# Patient Record
Sex: MALE | URBAN - METROPOLITAN AREA
[De-identification: names, ages, dates, MRNs, and addresses within clinical notes are randomized per-mention and may not be internally consistent; named-entity substitution may affect disease eponyms.]

---

## 2017-07-31 ENCOUNTER — APPOINTMENT (RX ONLY)
Dept: URBAN - METROPOLITAN AREA CLINIC 158 | Facility: CLINIC | Age: 57
Setting detail: DERMATOLOGY
End: 2017-07-31

## 2017-07-31 VITALS — HEART RATE: 81 BPM | SYSTOLIC BLOOD PRESSURE: 111 MMHG | DIASTOLIC BLOOD PRESSURE: 76 MMHG

## 2017-07-31 DIAGNOSIS — D22 MELANOCYTIC NEVI: ICD-10-CM

## 2017-07-31 DIAGNOSIS — D18.0 HEMANGIOMA: ICD-10-CM

## 2017-07-31 DIAGNOSIS — L57.0 ACTINIC KERATOSIS: ICD-10-CM

## 2017-07-31 DIAGNOSIS — L82.1 OTHER SEBORRHEIC KERATOSIS: ICD-10-CM

## 2017-07-31 PROBLEM — C44.01 BASAL CELL CARCINOMA OF SKIN OF LIP: Status: ACTIVE | Noted: 2017-07-31

## 2017-07-31 PROBLEM — D22.39 MELANOCYTIC NEVI OF OTHER PARTS OF FACE: Status: ACTIVE | Noted: 2017-07-31

## 2017-07-31 PROBLEM — C44.311 BASAL CELL CARCINOMA OF SKIN OF NOSE: Status: ACTIVE | Noted: 2017-07-31

## 2017-07-31 PROBLEM — D18.01 HEMANGIOMA OF SKIN AND SUBCUTANEOUS TISSUE: Status: ACTIVE | Noted: 2017-07-31

## 2017-07-31 PROCEDURE — ? MOHS SURGERY

## 2017-07-31 PROCEDURE — 99203 OFFICE O/P NEW LOW 30 MIN: CPT | Mod: 25

## 2017-07-31 PROCEDURE — 17311 MOHS 1 STAGE H/N/HF/G: CPT

## 2017-07-31 PROCEDURE — ? LIQUID NITROGEN

## 2017-07-31 PROCEDURE — ? COUNSELING

## 2017-07-31 PROCEDURE — ? DEFER

## 2017-07-31 PROCEDURE — 17000 DESTRUCT PREMALG LESION: CPT

## 2017-07-31 PROCEDURE — 17003 DESTRUCT PREMALG LES 2-14: CPT

## 2017-07-31 PROCEDURE — 15260 FTH/GFT FR N/E/E/L 20 SQCM/<: CPT

## 2017-07-31 ASSESSMENT — LOCATION SIMPLE DESCRIPTION DERM
LOCATION SIMPLE: SUPERIOR FOREHEAD
LOCATION SIMPLE: CHEST
LOCATION SIMPLE: RIGHT PRETIBIAL REGION
LOCATION SIMPLE: LEFT FOREHEAD
LOCATION SIMPLE: RIGHT CHEEK
LOCATION SIMPLE: LEFT CHEEK

## 2017-07-31 ASSESSMENT — LOCATION DETAILED DESCRIPTION DERM
LOCATION DETAILED: SUPERIOR MID FOREHEAD
LOCATION DETAILED: LEFT FOREHEAD
LOCATION DETAILED: LEFT MEDIAL SUPERIOR CHEST
LOCATION DETAILED: RIGHT INFERIOR CENTRAL MALAR CHEEK
LOCATION DETAILED: RIGHT CENTRAL MALAR CHEEK
LOCATION DETAILED: LEFT CENTRAL MALAR CHEEK
LOCATION DETAILED: RIGHT PROXIMAL PRETIBIAL REGION
LOCATION DETAILED: RIGHT LATERAL SUPERIOR CHEST

## 2017-07-31 ASSESSMENT — LOCATION ZONE DERM
LOCATION ZONE: FACE
LOCATION ZONE: LEG
LOCATION ZONE: TRUNK

## 2017-08-07 ENCOUNTER — APPOINTMENT (RX ONLY)
Dept: URBAN - METROPOLITAN AREA CLINIC 158 | Facility: CLINIC | Age: 57
Setting detail: DERMATOLOGY
End: 2017-08-07

## 2017-08-07 DIAGNOSIS — Z48.01 ENCOUNTER FOR CHANGE OR REMOVAL OF SURGICAL WOUND DRESSING: ICD-10-CM

## 2017-08-07 PROCEDURE — ? DRESSING CHANGE

## 2017-08-07 ASSESSMENT — LOCATION SIMPLE DESCRIPTION DERM: LOCATION SIMPLE: LEFT NOSE

## 2017-08-07 ASSESSMENT — LOCATION DETAILED DESCRIPTION DERM: LOCATION DETAILED: LEFT NASAL ALA

## 2017-08-07 ASSESSMENT — LOCATION ZONE DERM: LOCATION ZONE: NOSE

## 2017-09-08 ENCOUNTER — APPOINTMENT (RX ONLY)
Dept: URBAN - METROPOLITAN AREA CLINIC 158 | Facility: CLINIC | Age: 57
Setting detail: DERMATOLOGY
End: 2017-09-08

## 2017-09-08 VITALS — HEART RATE: 83 BPM | DIASTOLIC BLOOD PRESSURE: 74 MMHG | SYSTOLIC BLOOD PRESSURE: 108 MMHG

## 2017-09-08 PROBLEM — C44.01 BASAL CELL CARCINOMA OF SKIN OF LIP: Status: ACTIVE | Noted: 2017-09-08

## 2017-09-08 PROCEDURE — ? MOHS SURGERY

## 2017-09-08 PROCEDURE — 14061 TIS TRNFR E/N/E/L10.1-30SQCM: CPT | Mod: 79

## 2017-09-08 PROCEDURE — 17311 MOHS 1 STAGE H/N/HF/G: CPT | Mod: 79

## 2017-09-14 ENCOUNTER — APPOINTMENT (RX ONLY)
Dept: URBAN - METROPOLITAN AREA CLINIC 158 | Facility: CLINIC | Age: 57
Setting detail: DERMATOLOGY
End: 2017-09-14

## 2017-09-14 DIAGNOSIS — Z48.01 ENCOUNTER FOR CHANGE OR REMOVAL OF SURGICAL WOUND DRESSING: ICD-10-CM

## 2017-09-14 PROCEDURE — ? DRESSING CHANGE

## 2017-09-14 ASSESSMENT — LOCATION SIMPLE DESCRIPTION DERM: LOCATION SIMPLE: LEFT LIP

## 2017-09-14 ASSESSMENT — LOCATION DETAILED DESCRIPTION DERM: LOCATION DETAILED: LEFT LOWER CUTANEOUS LIP

## 2017-09-14 ASSESSMENT — LOCATION ZONE DERM: LOCATION ZONE: LIP

## 2018-06-25 ENCOUNTER — APPOINTMENT (RX ONLY)
Dept: URBAN - METROPOLITAN AREA CLINIC 158 | Facility: CLINIC | Age: 58
Setting detail: DERMATOLOGY
End: 2018-06-25

## 2018-06-25 VITALS — HEART RATE: 120 BPM | SYSTOLIC BLOOD PRESSURE: 99 MMHG | DIASTOLIC BLOOD PRESSURE: 84 MMHG

## 2018-06-25 DIAGNOSIS — L81.4 OTHER MELANIN HYPERPIGMENTATION: ICD-10-CM

## 2018-06-25 DIAGNOSIS — L57.0 ACTINIC KERATOSIS: ICD-10-CM

## 2018-06-25 DIAGNOSIS — L82.1 OTHER SEBORRHEIC KERATOSIS: ICD-10-CM

## 2018-06-25 PROCEDURE — 99213 OFFICE O/P EST LOW 20 MIN: CPT

## 2018-06-25 PROCEDURE — ? COUNSELING

## 2018-06-25 ASSESSMENT — LOCATION SIMPLE DESCRIPTION DERM
LOCATION SIMPLE: LEFT FOREARM
LOCATION SIMPLE: LEFT CHEEK
LOCATION SIMPLE: RIGHT FOREHEAD

## 2018-06-25 ASSESSMENT — LOCATION DETAILED DESCRIPTION DERM
LOCATION DETAILED: LEFT INFERIOR CENTRAL MALAR CHEEK
LOCATION DETAILED: RIGHT MEDIAL FOREHEAD
LOCATION DETAILED: LEFT VENTRAL DISTAL FOREARM

## 2018-06-25 ASSESSMENT — LOCATION ZONE DERM
LOCATION ZONE: ARM
LOCATION ZONE: FACE

## 2019-04-17 ENCOUNTER — APPOINTMENT (RX ONLY)
Dept: URBAN - METROPOLITAN AREA CLINIC 158 | Facility: CLINIC | Age: 59
Setting detail: DERMATOLOGY
End: 2019-04-17

## 2019-04-17 DIAGNOSIS — L82.1 OTHER SEBORRHEIC KERATOSIS: ICD-10-CM

## 2019-04-17 DIAGNOSIS — B07.8 OTHER VIRAL WARTS: ICD-10-CM

## 2019-04-17 DIAGNOSIS — L57.0 ACTINIC KERATOSIS: ICD-10-CM

## 2019-04-17 PROCEDURE — 17003 DESTRUCT PREMALG LES 2-14: CPT | Mod: 59

## 2019-04-17 PROCEDURE — 17000 DESTRUCT PREMALG LESION: CPT | Mod: 59

## 2019-04-17 PROCEDURE — ? LIQUID NITROGEN

## 2019-04-17 PROCEDURE — 17110 DESTRUCTION B9 LES UP TO 14: CPT

## 2019-04-17 PROCEDURE — ? COUNSELING

## 2019-04-17 PROCEDURE — 99213 OFFICE O/P EST LOW 20 MIN: CPT | Mod: 25

## 2019-04-17 ASSESSMENT — LOCATION ZONE DERM
LOCATION ZONE: ARM
LOCATION ZONE: NOSE
LOCATION ZONE: EAR
LOCATION ZONE: TRUNK
LOCATION ZONE: FACE

## 2019-04-17 ASSESSMENT — LOCATION DETAILED DESCRIPTION DERM
LOCATION DETAILED: NASAL DORSUM
LOCATION DETAILED: LEFT INFERIOR CRUS OF ANTIHELIX
LOCATION DETAILED: RIGHT CENTRAL MALAR CHEEK
LOCATION DETAILED: RIGHT INFERIOR CENTRAL MALAR CHEEK
LOCATION DETAILED: RIGHT CENTRAL TEMPLE
LOCATION DETAILED: LEFT INFERIOR CENTRAL MALAR CHEEK
LOCATION DETAILED: RIGHT INFERIOR TEMPLE
LOCATION DETAILED: LEFT DISTAL DORSAL FOREARM
LOCATION DETAILED: LEFT MEDIAL INFERIOR CHEST

## 2019-04-17 ASSESSMENT — LOCATION SIMPLE DESCRIPTION DERM
LOCATION SIMPLE: RIGHT CHEEK
LOCATION SIMPLE: CHEST
LOCATION SIMPLE: LEFT CHEEK
LOCATION SIMPLE: LEFT EAR
LOCATION SIMPLE: NOSE
LOCATION SIMPLE: LEFT FOREARM
LOCATION SIMPLE: RIGHT TEMPLE

## 2019-08-12 ENCOUNTER — TELEPHONE (OUTPATIENT)
Dept: ENDOSCOPY | Facility: HOSPITAL | Age: 59
End: 2019-08-12

## 2019-08-12 NOTE — TELEPHONE ENCOUNTER
----- Message from Aviva Navarro sent at 8/12/2019  8:43 AM CDT -----  Contact: PTs Wife  Wife calling requesting PT to get an appointment  PT has an in hand referral // PT covered under [employer]  Reason: Pancreas cyst / non cancerous / pancreatitis    Preferred Date: 09/03/19 or anything on that week.     Callback: [phone]

## 2019-08-16 ENCOUNTER — TELEPHONE (OUTPATIENT)
Dept: ENDOSCOPY | Facility: HOSPITAL | Age: 59
End: 2019-08-16

## 2019-08-16 NOTE — TELEPHONE ENCOUNTER
----- Message from Harsh Clark MD sent at 8/16/2019  3:02 PM CDT -----  Need Pancreas cyst clinic appointment and EUS.  Harsh Clark MD  ----- Message -----  From: Tabatha Purvis MA  Sent: 8/16/2019   2:55 PM  To: Harsh Clark MD    Please review scanned records. Should he come to clinic or EUS?

## 2019-08-23 ENCOUNTER — TELEPHONE (OUTPATIENT)
Dept: ENDOSCOPY | Facility: HOSPITAL | Age: 59
End: 2019-08-23

## 2019-08-23 NOTE — TELEPHONE ENCOUNTER
----- Message from Kaylee Lange sent at 8/23/2019  1:20 PM CDT -----  Contact: self [phone]  Pt would like for you to know the number.    Contact: self [phone]

## 2019-08-27 ENCOUNTER — TELEPHONE (OUTPATIENT)
Dept: ENDOSCOPY | Facility: HOSPITAL | Age: 59
End: 2019-08-27

## 2019-09-12 ENCOUNTER — HOSPITAL ENCOUNTER (OUTPATIENT)
Dept: RADIOLOGY | Facility: HOSPITAL | Age: 59
Discharge: HOME | End: 2019-09-12
Attending: INTERNAL MEDICINE
Payer: OTHER GOVERNMENT

## 2019-09-12 ENCOUNTER — OFFICE VISIT (OUTPATIENT)
Dept: GASTROENTEROLOGY | Facility: CLINIC | Age: 59
End: 2019-09-12
Payer: OTHER GOVERNMENT

## 2019-09-12 VITALS
SYSTOLIC BLOOD PRESSURE: 111 MMHG | HEART RATE: 126 BPM | DIASTOLIC BLOOD PRESSURE: 77 MMHG | WEIGHT: 140.63 LBS | BODY MASS INDEX: 23.43 KG/M2 | HEIGHT: 65 IN

## 2019-09-12 DIAGNOSIS — K86.2 PANCREAS CYST: ICD-10-CM

## 2019-09-12 DIAGNOSIS — K85.00 IDIOPATHIC ACUTE PANCREATITIS WITHOUT INFECTION OR NECROSIS: Primary | ICD-10-CM

## 2019-09-12 DIAGNOSIS — K86.89 PANCREATIC DUCT DILATED: ICD-10-CM

## 2019-09-12 DIAGNOSIS — K85.00 IDIOPATHIC ACUTE PANCREATITIS WITHOUT INFECTION OR NECROSIS: ICD-10-CM

## 2019-09-12 LAB
CREAT SERPL-MCNC: 1.1 MG/DL (ref 0.5–1.4)
SAMPLE: NORMAL

## 2019-09-12 PROCEDURE — 74160 CT ABDOMEN W/CONTRAST: CPT | Mod: 26,,, | Performed by: RADIOLOGY

## 2019-09-12 PROCEDURE — 74160 CT ABDOMEN WITH CONTRAST: ICD-10-PCS | Mod: 26,,, | Performed by: RADIOLOGY

## 2019-09-12 PROCEDURE — 99203 OFFICE O/P NEW LOW 30 MIN: CPT | Mod: S$PBB,,, | Performed by: INTERNAL MEDICINE

## 2019-09-12 PROCEDURE — 74160 CT ABDOMEN W/CONTRAST: CPT | Mod: TC

## 2019-09-12 PROCEDURE — 99203 PR OFFICE/OUTPT VISIT, NEW, LEVL III, 30-44 MIN: ICD-10-PCS | Mod: S$PBB,,, | Performed by: INTERNAL MEDICINE

## 2019-09-12 PROCEDURE — 99999 PR PBB SHADOW E&M-EST. PATIENT-LVL III: ICD-10-PCS | Mod: PBBFAC,,, | Performed by: INTERNAL MEDICINE

## 2019-09-12 PROCEDURE — 99213 OFFICE O/P EST LOW 20 MIN: CPT | Mod: PBBFAC,25 | Performed by: INTERNAL MEDICINE

## 2019-09-12 PROCEDURE — 25500020 PHARM REV CODE 255: Performed by: INTERNAL MEDICINE

## 2019-09-12 PROCEDURE — 99999 PR PBB SHADOW E&M-EST. PATIENT-LVL III: CPT | Mod: PBBFAC,,, | Performed by: INTERNAL MEDICINE

## 2019-09-12 RX ORDER — SUCRALFATE 1 G/10ML
1 SUSPENSION ORAL
COMMUNITY
Start: 2017-12-13

## 2019-09-12 RX ORDER — ONDANSETRON HYDROCHLORIDE 8 MG/1
8 TABLET, FILM COATED ORAL EVERY 12 HOURS PRN
Qty: 14 TABLET | Refills: 0 | Status: SHIPPED | OUTPATIENT
Start: 2019-09-12 | End: 2019-09-19

## 2019-09-12 RX ORDER — OMEPRAZOLE 20 MG/1
20 CAPSULE, DELAYED RELEASE ORAL
COMMUNITY
Start: 2012-12-18 | End: 2021-03-18

## 2019-09-12 RX ORDER — HYDROCODONE BITARTRATE AND ACETAMINOPHEN 7.5; 325 MG/15ML; MG/15ML
15 SOLUTION ORAL EVERY 6 HOURS PRN
Qty: 300 ML | Refills: 0 | Status: SHIPPED | OUTPATIENT
Start: 2019-09-12 | End: 2019-09-17

## 2019-09-12 RX ORDER — SIMVASTATIN 40 MG/1
40 TABLET, FILM COATED ORAL
COMMUNITY

## 2019-09-12 RX ORDER — CHOLECALCIFEROL (VITAMIN D3) 25 MCG
1000 TABLET ORAL
COMMUNITY

## 2019-09-12 RX ORDER — ACETAMINOPHEN 325 MG/1
650 TABLET ORAL
COMMUNITY

## 2019-09-12 RX ADMIN — IOHEXOL 75 ML: 350 INJECTION, SOLUTION INTRAVENOUS at 01:09

## 2019-09-12 NOTE — PROGRESS NOTES
[facility] Note    Reason for Visit:  The primary encounter diagnosis was Idiopathic acute pancreatitis without infection or necrosis. Diagnoses of Pancreas cyst and Pancreatic duct dilated were also pertinent to this visit.    PCP: Primary Doctor No   No address on file    HPI:  This is a 59 y.o. male here for evaluation of pancreas cyst and abdominal pain. The patient is carrying a diagnosis of side branched IPMN since 2013 with multiple EUS's with FNA (CEA >200). FNA did not demonstrated evidence of malignancy. He was follow with the combination of EUS's and imaging which demonstrated evidence of stability to decreased size of the pancreas cyst. The cyst is located in the body of the pancreas and measured <3 cm. There is evidence of progressive dilation of the PD up to 8 mm. In 2017 he underwent pancreatoscopy for biopsies. The patient developed pancreatitis and since then he stated intermittent episodes of abdominal pain. He have two EUS's with celiac plexus block which improved his symptoms. He stated he is very functional except with the episodes of pain for what he used acetaminophen. He doesn't smoke or drink alcohol. Denied family history of pancreatic cancer. He is S/P cholecystectomy. He was just discharge form the hospital with acute pancreatitis. This is his second episodes. Today he complaint of diffuse abdominal pain with nausea and diarrhea.        ROS:  Constitutional: No fevers. Stated chills  CV: No chest pain or palpitations  Pulm: No cough, No shortness of breath  GI: see HPI    Medical History:  has no past medical history on file.    Surgical History:  has no past surgical history on file.    Family History: family history is not on file..     Social History:  reports that he quit smoking about 20 years ago. His smoking use included cigarettes. He has a 10.00 pack-year smoking history. He does not have any smokeless tobacco history on file.    Review of patient's  "allergies indicates:   Allergen Reactions    Codeine Other (See Comments)     headache    Aspirin Rash    Penicillins Rash           Objective Findings:    Vital Signs:  /77   Pulse (!) 126   Ht 5' 5" (1.651 m)   Wt 63.8 kg (140 lb 10.5 oz)   BMI 23.41 kg/m²   Body mass index is 23.41 kg/m².    Physical Exam:  General Appearance: Well appearing but in moderate distress  HEENT:   Normocephalic, without obvious abnormality.  Sclera anicteric, no conjunctival pallor, Lips, mucosa, and tongue pink and dry; teeth and gums normal  NECK:  Supple, no lymphadenopathy noted  Lungs: CTA bilaterally in anterior and posterior fields, no wheezes, no crackles.  Heart:  Regular rate and rhythm, S1, S2 normal, no murmurs heard  Abdomen:Soft with diffuse abdominal tenderness  Extremities: no cyanosis or edema  Skin: No rash  Neurologic: Grossly normal      Labs:  Lab Results   Component Value Date    WBC 16.34 (H) 09/12/2019    HGB 14.9 09/12/2019    HCT 44.4 09/12/2019     09/12/2019    ALT 15 09/12/2019    AST 18 09/12/2019     09/12/2019    K 4.3 09/12/2019     09/12/2019    CREATININE 1.1 09/12/2019    BUN 18 09/12/2019    CO2 20 (L) 09/12/2019              Assessment:  1. Idiopathic acute pancreatitis without infection or necrosis    2. Pancreas cyst    3. Pancreatic duct dilated      The patient carried a diagnoses of branched IPMN but the dilation of the PD is concerning for mixed IPMN which carry a higher risk for malignancy.    Recommendations:  1. CBC, CMP, lipase and CT scan pancreas mass protocol  2. Aggressive hydration, antiemetics (Zofran) and short term analgesia (hydrocodone/acetaminophen) for 5 days.  3. Base on imaging and lab work, we will make further recommendations        Order summary:  Orders Placed This Encounter   Procedures    CT Abdomen With Contrast    CBC auto differential    Comprehensive metabolic panel    Lipase       Thank you so much for allowing me to " participate in the care of Jose Rafael Clark MD

## 2019-09-13 ENCOUNTER — TELEPHONE (OUTPATIENT)
Dept: ENDOSCOPY | Facility: HOSPITAL | Age: 59
End: 2019-09-13

## 2019-09-13 DIAGNOSIS — K86.2 PANCREATIC CYST: Primary | ICD-10-CM

## 2019-09-13 NOTE — TELEPHONE ENCOUNTER
----- Message from Harsh Clark MD sent at 9/12/2019  9:10 PM CDT -----  Please let the patient know the lipase was normal and the CT scan showed a 3 cm pancreas cyst with dilated PD. No evidence of pancreatitis. Need EUS for further evaluation and Surgical Oncology appointment.

## 2019-09-17 ENCOUNTER — PATIENT MESSAGE (OUTPATIENT)
Dept: ENDOSCOPY | Facility: HOSPITAL | Age: 59
End: 2019-09-17

## 2019-09-17 ENCOUNTER — TELEPHONE (OUTPATIENT)
Dept: ENDOSCOPY | Facility: HOSPITAL | Age: 59
End: 2019-09-17

## 2019-09-17 ENCOUNTER — PATIENT MESSAGE (OUTPATIENT)
Dept: SURGERY | Facility: CLINIC | Age: 59
End: 2019-09-17

## 2019-09-17 NOTE — TELEPHONE ENCOUNTER
Spoke with patient's wife. EUS scheduled for 10/3 at 9a. Reviewed prep instructions. Ms Campbell verbalized understanding.    Can you schedule him for same day if possible?

## 2019-09-20 ENCOUNTER — TELEPHONE (OUTPATIENT)
Dept: ENDOSCOPY | Facility: HOSPITAL | Age: 59
End: 2019-09-20

## 2019-09-20 NOTE — TELEPHONE ENCOUNTER
----- Message from Memo Larios sent at 9/20/2019  8:08 AM CDT -----  Contact: Tiffany (wife): [phone]  Pt's wife would like to speak with Rayna re the surgery that has to be scheduled, state the surgeon has not contacted them      Please contact Tiffany (wife): [phone]

## 2019-10-02 RX ORDER — INDOMETHACIN 25 MG/1
CAPSULE ORAL
COMMUNITY
Start: 2019-09-17

## 2019-10-03 ENCOUNTER — HOSPITAL ENCOUNTER (OUTPATIENT)
Dept: CARDIOLOGY | Facility: CLINIC | Age: 59
Discharge: HOME | End: 2019-10-03
Payer: OTHER GOVERNMENT

## 2019-10-03 ENCOUNTER — ANESTHESIA EVENT (OUTPATIENT)
Dept: ENDOSCOPY | Facility: HOSPITAL | Age: 59
End: 2019-10-03
Payer: OTHER GOVERNMENT

## 2019-10-03 ENCOUNTER — ANESTHESIA (OUTPATIENT)
Dept: ENDOSCOPY | Facility: HOSPITAL | Age: 59
End: 2019-10-03
Payer: OTHER GOVERNMENT

## 2019-10-03 ENCOUNTER — HOSPITAL ENCOUNTER (OUTPATIENT)
Dept: RADIOLOGY | Facility: HOSPITAL | Age: 59
Discharge: HOME | End: 2019-10-03
Attending: SURGERY | Admitting: INTERNAL MEDICINE
Payer: OTHER GOVERNMENT

## 2019-10-03 ENCOUNTER — INITIAL CONSULT (OUTPATIENT)
Dept: SURGERY | Facility: CLINIC | Age: 59
End: 2019-10-03
Payer: OTHER GOVERNMENT

## 2019-10-03 ENCOUNTER — HOSPITAL ENCOUNTER (OUTPATIENT)
Facility: HOSPITAL | Age: 59
Discharge: HOME | End: 2019-10-03
Attending: INTERNAL MEDICINE | Admitting: INTERNAL MEDICINE
Payer: OTHER GOVERNMENT

## 2019-10-03 VITALS
SYSTOLIC BLOOD PRESSURE: 123 MMHG | HEIGHT: 65 IN | HEART RATE: 70 BPM | OXYGEN SATURATION: 98 % | BODY MASS INDEX: 23.16 KG/M2 | DIASTOLIC BLOOD PRESSURE: 79 MMHG | RESPIRATION RATE: 18 BRPM | WEIGHT: 139 LBS | TEMPERATURE: 99 F

## 2019-10-03 DIAGNOSIS — D49.0 IPMN (INTRADUCTAL PAPILLARY MUCINOUS NEOPLASM): Primary | ICD-10-CM

## 2019-10-03 DIAGNOSIS — K86.2 PANCREAS CYST: Primary | ICD-10-CM

## 2019-10-03 DIAGNOSIS — K86.2 PANCREATIC CYST: Primary | ICD-10-CM

## 2019-10-03 DIAGNOSIS — K86.2 PANCREATIC CYST: ICD-10-CM

## 2019-10-03 PROCEDURE — 63600175 PHARM REV CODE 636 W HCPCS: Performed by: INTERNAL MEDICINE

## 2019-10-03 PROCEDURE — 71046 XR CHEST PA AND LATERAL: ICD-10-PCS | Mod: 26,,, | Performed by: RADIOLOGY

## 2019-10-03 PROCEDURE — 43259 EGD US EXAM DUODENUM/JEJUNUM: CPT | Performed by: INTERNAL MEDICINE

## 2019-10-03 PROCEDURE — 00731 ANES UPR GI NDSC PX NOS: CPT | Performed by: INTERNAL MEDICINE

## 2019-10-03 PROCEDURE — D9220A PRA ANESTHESIA: Mod: ANES,,, | Performed by: ANESTHESIOLOGY

## 2019-10-03 PROCEDURE — D9220A PRA ANESTHESIA: ICD-10-PCS | Mod: ANES,,, | Performed by: ANESTHESIOLOGY

## 2019-10-03 PROCEDURE — 37000009 HC ANESTHESIA EA ADD 15 MINS: Performed by: INTERNAL MEDICINE

## 2019-10-03 PROCEDURE — 99212 OFFICE O/P EST SF 10 MIN: CPT | Mod: PBBFAC,25 | Performed by: SURGERY

## 2019-10-03 PROCEDURE — 63600175 PHARM REV CODE 636 W HCPCS: Performed by: NURSE ANESTHETIST, CERTIFIED REGISTERED

## 2019-10-03 PROCEDURE — 93010 EKG 12-LEAD: ICD-10-PCS | Mod: S$PBB,,, | Performed by: INTERNAL MEDICINE

## 2019-10-03 PROCEDURE — 43259 EGD US EXAM DUODENUM/JEJUNUM: CPT | Mod: ,,, | Performed by: INTERNAL MEDICINE

## 2019-10-03 PROCEDURE — 71046 X-RAY EXAM CHEST 2 VIEWS: CPT | Mod: 26,,, | Performed by: RADIOLOGY

## 2019-10-03 PROCEDURE — 37000008 HC ANESTHESIA 1ST 15 MINUTES: Performed by: INTERNAL MEDICINE

## 2019-10-03 PROCEDURE — 99999 PR PBB SHADOW E&M-EST. PATIENT-LVL II: CPT | Mod: PBBFAC,,, | Performed by: SURGERY

## 2019-10-03 PROCEDURE — 99205 PR OFFICE/OUTPT VISIT, NEW, LEVL V, 60-74 MIN: ICD-10-PCS | Mod: S$PBB,,, | Performed by: SURGERY

## 2019-10-03 PROCEDURE — 93005 ELECTROCARDIOGRAM TRACING: CPT | Mod: PBBFAC | Performed by: INTERNAL MEDICINE

## 2019-10-03 PROCEDURE — 43259 PR ENDOSCOPIC ULTRASOUND EXAM: ICD-10-PCS | Mod: ,,, | Performed by: INTERNAL MEDICINE

## 2019-10-03 PROCEDURE — D9220A PRA ANESTHESIA: ICD-10-PCS | Mod: CRNA,,, | Performed by: NURSE ANESTHETIST, CERTIFIED REGISTERED

## 2019-10-03 PROCEDURE — D9220A PRA ANESTHESIA: Mod: CRNA,,, | Performed by: NURSE ANESTHETIST, CERTIFIED REGISTERED

## 2019-10-03 PROCEDURE — 99999 PR PBB SHADOW E&M-EST. PATIENT-LVL II: ICD-10-PCS | Mod: PBBFAC,,, | Performed by: SURGERY

## 2019-10-03 PROCEDURE — 93010 ELECTROCARDIOGRAM REPORT: CPT | Mod: S$PBB,,, | Performed by: INTERNAL MEDICINE

## 2019-10-03 PROCEDURE — 71046 X-RAY EXAM CHEST 2 VIEWS: CPT | Mod: TC,FY

## 2019-10-03 PROCEDURE — 99205 OFFICE O/P NEW HI 60 MIN: CPT | Mod: S$PBB,,, | Performed by: SURGERY

## 2019-10-03 RX ORDER — PROPOFOL 10 MG/ML
VIAL (ML) INTRAVENOUS CONTINUOUS PRN
Status: DISCONTINUED | OUTPATIENT
Start: 2019-10-03 | End: 2019-10-03

## 2019-10-03 RX ORDER — SODIUM CHLORIDE 9 MG/ML
INJECTION, SOLUTION INTRAVENOUS CONTINUOUS
Status: DISCONTINUED | OUTPATIENT
Start: 2019-10-03 | End: 2019-10-03 | Stop reason: HOSPADM

## 2019-10-03 RX ORDER — HYDROMORPHONE HYDROCHLORIDE 1 MG/ML
0.2 INJECTION, SOLUTION INTRAMUSCULAR; INTRAVENOUS; SUBCUTANEOUS EVERY 5 MIN PRN
Status: DISCONTINUED | OUTPATIENT
Start: 2019-10-03 | End: 2019-10-03 | Stop reason: HOSPADM

## 2019-10-03 RX ORDER — LIDOCAINE HCL/PF 100 MG/5ML
SYRINGE (ML) INTRAVENOUS
Status: DISCONTINUED | OUTPATIENT
Start: 2019-10-03 | End: 2019-10-03

## 2019-10-03 RX ORDER — SODIUM CHLORIDE 0.9 % (FLUSH) 0.9 %
10 SYRINGE (ML) INJECTION
Status: DISCONTINUED | OUTPATIENT
Start: 2019-10-03 | End: 2019-10-03 | Stop reason: HOSPADM

## 2019-10-03 RX ORDER — PROPOFOL 10 MG/ML
VIAL (ML) INTRAVENOUS
Status: DISCONTINUED | OUTPATIENT
Start: 2019-10-03 | End: 2019-10-03

## 2019-10-03 RX ORDER — SODIUM CHLORIDE 0.9 % (FLUSH) 0.9 %
3 SYRINGE (ML) INJECTION
Status: DISCONTINUED | OUTPATIENT
Start: 2019-10-03 | End: 2019-10-03 | Stop reason: HOSPADM

## 2019-10-03 RX ADMIN — PROPOFOL 200 MCG/KG/MIN: 10 INJECTION, EMULSION INTRAVENOUS at 09:10

## 2019-10-03 RX ADMIN — PROPOFOL 50 MG: 10 INJECTION, EMULSION INTRAVENOUS at 09:10

## 2019-10-03 RX ADMIN — SODIUM CHLORIDE: 0.9 INJECTION, SOLUTION INTRAVENOUS at 08:10

## 2019-10-03 RX ADMIN — LIDOCAINE HYDROCHLORIDE 100 MG: 20 INJECTION, SOLUTION INTRAVENOUS at 09:10

## 2019-10-03 NOTE — ANESTHESIA PREPROCEDURE EVALUATION
10/03/2019  Jose Rafael Campbell is a 59 y.o., male.    Anesthesia Evaluation    I have reviewed the Patient Summary Reports.        Review of Systems  Anesthesia Hx:  No problems with previous Anesthesia    Social:  Non-Smoker    Hematology/Oncology:  Hematology Normal   Oncology Normal     EENT/Dental:EENT/Dental Normal   Cardiovascular:  Cardiovascular Normal     Pulmonary:  Pulmonary Normal    Renal/:  Renal/ Normal     Hepatic/GI:   Pancreatitis   Musculoskeletal:  Musculoskeletal Normal    Neurological:  Neurology Normal    Endocrine:  Endocrine Normal    Dermatological:  Skin Normal    Psych:  Psychiatric Normal           Physical Exam  General:  Well nourished    Airway/Jaw/Neck:  Airway Findings: Mouth Opening: Normal Tongue: Normal  General Airway Assessment: Adult  Mallampati: II  Improves to II with phonation.  TM Distance: Normal, at least 6 cm  Jaw/Neck Findings:  Neck ROM: Normal ROM      Dental:  Dental Findings: In tact   Chest/Lungs:  Chest/Lungs Findings: Clear to auscultation, Normal Respiratory Rate     Heart/Vascular:  Heart Findings: Rate: Normal  Rhythm: Regular Rhythm  Sounds: Normal             Anesthesia Plan  Type of Anesthesia, risks & benefits discussed:  Anesthesia Type:  general  Patient's Preference: General  Intra-op Monitoring Plan:   Intra-op Monitoring Plan Comments:   Post Op Pain Control Plan:   Post Op Pain Control Plan Comments:   Induction:   IV  Beta Blocker:  Patient is not currently on a Beta-Blocker (No further documentation required).       Informed Consent: Patient understands risks and agrees with Anesthesia plan.  Questions answered. Anesthesia consent signed with patient.  ASA Score: 3     Day of Surgery Review of History & Physical: I have interviewed and examined the patient. I have reviewed the patient's H&P dated:  There are no significant changes.           Ready For Surgery From Anesthesia Perspective.

## 2019-10-03 NOTE — DISCHARGE INSTRUCTIONS
Endoscopic Ultrasound (EUS)    An endoscopic ultrasound (EUS) is a test to look at the inside of your gastrointestinal (GI) tract. It's commonly used to look for cancers or growths in the esophagus, stomach, pancreas, liver, and rectum. It can help to stage cancer (see how advanced a cancer is). EUS may also be used to help diagnose certain diseases or to drain cysts or abscesses.  What is EUS?  EUS shows both ultrasound images and live video of the GI tract. During the test, a flexible tube called an endoscope (scope) is used. At the end of the scope is a tiny video camera and light. The video camera sends live images to a monitor. The scope also contains a very small ultrasound device. This uses sound waves to create images and send them to a monitor.  A needle is passed through the scope. The needle can be used take a small sample of tissue for testing. This is called a biopsy. The needle can be used to take a sample of fluid. This is called fine-needle aspiration (FNA).  Risks and possible complications of EUS  Risks and possible complications include the following:  · Bleeding  · Infection  · A perforation (hole) in the digestive tract   · Risks of sedation or anesthesia   Before the test  Be prepared prior to the test:  · Tell your healthcare provider what medicine you take. This includes vitamins, herbs, and over-the-counter medicine. It also includes any blood thinners, such as warfarin, clopidogrel, ibuprofen, or daily aspirin. Ask your healthcare provider if you need to stop taking some or all of them before the test.  · You may be prescribed antibiotics to take before or after the test. This depends on the area being studied and what is done during the test. These medicines help prevent infection.  · Carefully follow the instructions for preparing for the test to make sure results are accurate. Instructions may include:  ¨ If youre having an EUS of the upper GI tract (esophagus, stomach, duodenum,  pancreas, liver):  § Do not eat or drink for 6 hours before the test.  ¨ If youre having an EUS of the lower GI tract (rectum):  § Before the test, do bowel prep as instructed to clean your rectum of stool. This may involve a clear liquid diet and using a laxative (liquid or pills) the night before the test. Or it may mean doing one or more enemas the morning of the test.  § Do not eat or drink for 6 hours before the test.  · Be sure to arrive on time at the facility. Bring your identification and health insurance card. Leave valuables at home. If you have them, bring X-rays or other test results with you.  Let the healthcare provider know  For your safety, tell the healthcare provider if you:  · Take insulin. Your dose may need to be changed on the day of your test.  · Are allergic to latex.  · Have any other allergies.  · Are taking blood thinners.   During the test  An endoscopic ultrasound usually takes place in a hospital. The procedure itself may take 1 to 2 hours. You will likely go home soon afterward. During the test:  · You lie on your left side on an exam table.  · An intravenous (IV) line will be put into a vein in your arm or hand. This line supplies fluids and medicines. To keep you comfortable during the test, you will be given a sedative medicine. This medicine prevents discomfort and will make you sleepy.  · If you are having an EUS of the upper GI tract, local anesthetic may be sprayed in your throat. This will help you be more comfortable as the healthcare provider inserts the scope. The healthcare provider then gently puts the flexible scope into your mouth or nose and down your throat.  · If youre having an EUS of the lower GI tract, the healthcare provider gently puts the flexible scope into your anus.  · During the test, the scope sends live video and ultrasound images from inside your body to nearby monitors. These are used to examine your GI tract. Specialized procedures, such as drainage,  are done as needed.  · The healthcare provider may discuss the results with you soon after the test. Biopsy results take several  days.  · In most cases, you can go home within a few hours of the test. When you leave the facility, have an adult family member or friend drive you, even if you don't feel that sleepy.  After the test  Here is what to expect after the test:  · You may feel tired from the sedative. This should wear off by the end of the day.  · If you had an upper digestive endoscopy, your throat may feel sore for a day or two. Over-the-counter sore throat lozenges and spray should help.  · You can eat and drink normally as soon as the test is done.  When to call the healthcare provider  Call your healthcare provider if you notice any of the following:  · Fever of 100.4°F (38.0°C) or higher, or as advised by your healthcare provider  · Shortness of breath  · Vomiting blood, blood in stool, or black stools  · Coughing or hoarse voice that wont go away   Date Last Reviewed: 7/1/2016 © 2000-2017 [employer]. [address] [place] [address]. All rights reserved. This information is not intended as a substitute for professional medical care. Always follow your healthcare professional's instructions.      Recovery After Procedural Sedation (Adult)  You have been given medicine by vein to make you sleep during your surgery. This may have included both a pain medicine and sleeping medicine. Most of the effects have worn off. But you may still have some drowsiness for the next 6 to 8 hours.  Home care  Follow these guidelines when you get home:  · For the next 8 hours, you should be watched by a responsible adult. This person should make sure your condition is not getting worse.  · Don't drink any alcohol for the next 24 hours.  · Don't drive, operate dangerous machinery, or make important business or personal decisions during the next 24 hours.  Note: Your healthcare provider may tell  you not to take any medicine by mouth for pain or sleep in the next 4 hours. These medicines may react with the medicines you were given in the hospital. This could cause a much stronger response than usual.  Follow-up care  Follow up with your healthcare provider if you are not alert and back to your usual level of activity within 12 hours.  When to seek medical advice  Call your healthcare provider right away if any of these occur:  · Drowsiness gets worse  · Weakness or dizziness gets worse  · Repeated vomiting  · You can't be awakened   Date Last Reviewed: 10/18/2016  © 4609-2307 [employer]. [address] [place] [address], [place] [address]. All rights reserved. This information is not intended as a substitute for professional medical care. Always follow your healthcare professional's instructions.

## 2019-10-03 NOTE — LETTER
October 4, 2019      Harsh Clark MD  [address]  [facility] [address]           [facility]  [address]  [facility] [address]  Phone: [phone]          Patient: Jose Rafael Campbell   MR Number: [id number]   YOB: 1960   Date of Visit: 10/3/2019       Dear Dr. Harsh Clark:    Thank you for referring Jose Rafael Campbell to me for evaluation. Attached you will find relevant portions of my assessment and plan of care.    If you have questions, please do not hesitate to call me. I look forward to following Jose Rafael Campbell along with you.    Sincerely,    Paul King MD    Enclosure  CC:  No Recipients    If you would like to receive this communication electronically, please contact [email or web address] or [phone] to request more information on [employer] access.    For providers and/or their staff who would like to refer a patient to [facility], please contact us through our one-stop-shop provider referral line, [facility], at [phone].    If you feel you have received this communication in error or would no longer like to receive these types of communications, please e-mail [email or web address]

## 2019-10-03 NOTE — TRANSFER OF CARE
"Anesthesia Transfer of Care Note    Patient: Jose Rafael Campbell    Procedure(s) Performed: Procedure(s) (LRB):  ULTRASOUND, UPPER GI TRACT, ENDOSCOPIC (N/A)    Patient location: [facility]    Anesthesia Type: general    Transport from OR: Transported from OR on 2-3 L/min O2 by NC with adequate spontaneous ventilation    Post pain: adequate analgesia    Post assessment: no apparent anesthetic complications and tolerated procedure well    Post vital signs: stable    Level of consciousness: sedated and responds to stimulation    Nausea/Vomiting: no nausea/vomiting    Complications: none    Transfer of care protocol was followed      Last vitals:   Visit Vitals  /79 (BP Location: Left arm, Patient Position: Lying)   Pulse 77   Temp 36.4 °C (97.5 °F) (Temporal)   Resp 17   Ht 5' 5" (1.651 m)   Wt 63 kg (139 lb)   SpO2 98%   BMI 23.13 kg/m²     "

## 2019-10-03 NOTE — [employer] PATIENT INSTRUCTIONS
Discharge Summary/Instructions after an Endoscopic Procedure  Patient Name: Jose Rafael Campbell  Patient MRN: [id number]  Patient YOB: 1960 Thursday, October 03, 2019  Harsh Clark MD  RESTRICTIONS:  During your procedure today, you received medications for sedation.  These   medications may affect your judgment, balance and coordination.  Therefore,   for 24 hours, you have the following restrictions:   - DO NOT drive a car, operate machinery, make legal/financial decisions,   sign important papers or drink alcohol.    ACTIVITY:  Today: no heavy lifting, straining or running due to procedural   sedation/anesthesia.  The following day: return to full activity including work.  DIET:  Eat and drink normally unless instructed otherwise.     TREATMENT FOR COMMON SIDE EFFECTS:  - Mild abdominal pain, nausea, belching, bloating or excessive gas:  rest,   eat lightly and use a heating pad.  - Sore Throat: treat with throat lozenges and/or gargle with warm salt   water.  - Because air was used during the procedure, expelling large amounts of air   from your rectum or belching is normal.  - If a bowel prep was taken, you may not have a bowel movement for 1-3 days.    This is normal.  SYMPTOMS TO WATCH FOR AND REPORT TO YOUR PHYSICIAN:  1. Abdominal pain or bloating, other than gas cramps.  2. Chest pain.  3. Back pain.  4. Signs of infection such as: chills or fever occurring within 24 hours   after the procedure.  5. Rectal bleeding, which would show as bright red, maroon, or black stools.   (A tablespoon of blood from the rectum is not serious, especially if   hemorrhoids are present.)  6. Vomiting.  7. Weakness or dizziness.  GO DIRECTLY TO THE NEAREST EMERGENCY ROOM IF YOU HAVE ANY OF THE FOLLOWING:      Difficulty breathing              Chills and/or fever over 101 F   Persistent vomiting and/or vomiting blood   Severe abdominal pain   Severe chest pain   Black, tarry stools   Bleeding- more than one  tablespoon   Any other symptom or condition that you feel may need urgent attention  Your doctor recommends these additional instructions:  If any biopsies were taken, your doctors clinic will contact you in 1 to 2   weeks with any results.  - Discharge patient to home (ambulatory).   - Refer to Surgical Oncology.  For questions, problems or results please call your physician - Harsh Clark MD at Work:  [phone].  [facility], EMERGENCY ROOM PHONE NUMBER: [phone]  IF A COMPLICATION OR EMERGENCY SITUATION ARISES AND YOU ARE UNABLE TO REACH   YOUR PHYSICIAN - GO DIRECTLY TO THE EMERGENCY ROOM.  Harsh Clark MD  10/3/2019 10:02:57 AM  This report has been verified and signed electronically.  [employer]

## 2019-10-03 NOTE — DISCHARGE SUMMARY
Discharge Summary/Instructions after an Endoscopic Procedure    Patient Name: Jose Rafael Campbell  Patient MRN: [id number]  Patient YOB: 1960 Thursday, October 03, 2019  Harsh Clark MD    RESTRICTIONS:  During your procedure today, you received medications for sedation.  These medications may affect your judgment, balance and coordination.  Therefore, for 24 hours, you have the following restrictions:     - DO NOT drive a car, operate machinery, make legal/financial decisions, sign important papers or drink alcohol.      ACTIVITY:  Today: no heavy lifting, straining or running due to procedural sedation/anesthesia.  The following day: return to full activity including work.    DIET:  Eat and drink normally unless instructed otherwise.     TREATMENT FOR COMMON SIDE EFFECTS:  - Mild abdominal pain, nausea, belching, bloating or excessive gas:  rest, eat lightly and use a heating pad.  - Sore Throat: treat with throat lozenges and/or gargle with warm salt water.  - Because air was used during the procedure, expelling large amounts of air from your rectum or belching is normal.  - If a bowel prep was taken, you may not have a bowel movement for 1-3 days.  This is normal.      SYMPTOMS TO WATCH FOR AND REPORT TO YOUR PHYSICIAN:  1. Abdominal pain or bloating, other than gas cramps.  2. Chest pain.  3. Back pain.  4. Signs of infection such as: chills or fever occurring within 24 hours after the procedure.  5. Rectal bleeding, which would show as bright red, maroon, or black stools. (A tablespoon of blood from the rectum is not serious, especially if hemorrhoids are present.)  6. Vomiting.  7. Weakness or dizziness.      GO DIRECTLY TO THE NEAREST EMERGENCY ROOM IF YOU HAVE ANY OF THE FOLLOWING:     Difficulty breathing              Chills and/or fever over 101 F   Persistent vomiting and/or vomiting blood   Severe abdominal pain   Severe chest pain   Black, tarry stools   Bleeding- more than one  tablespoon   Any other symptom or condition that you feel may need urgent attention    Your doctor recommends these additional instructions:  If any biopsies were taken, your doctors clinic will contact you in 1 to 2 weeks with any results.    - Discharge patient to home (ambulatory).   - Refer to Surgical Oncology.    For questions, problems or results please call your physician - Harsh Clark MD at Work:  [phone].    [facility], EMERGENCY ROOM PHONE NUMBER: [phone]    IF A COMPLICATION OR EMERGENCY SITUATION ARISES AND YOU ARE UNABLE TO REACH YOUR PHYSICIAN - GO DIRECTLY TO THE EMERGENCY ROOM.

## 2019-10-03 NOTE — PROGRESS NOTES
CHIEF COMPLAINT:  Abdominal     HPI:  This is a 59 y.o. male here for evaluation of pancreas cyst and abdominal pain. The patient is carrying a diagnosis of side branched IPMN since 2013 with multiple EUS's with FNA (CEA >200). FNA did not demonstrated evidence of malignancy. He was follow with the combination of EUS's and imaging which demonstrated evidence of stability to decreased size of the pancreas cyst. The cyst is located in the body of the pancreas and measured <3 cm. There is evidence of progressive dilation of the PD up to 8 mm. In 2017 he underwent pancreatoscopy for biopsies. The patient developed pancreatitis and since then he stated intermittent episodes of abdominal pain. He have two EUS's with celiac plexus block which improved his symptoms. He stated he is very functional except with the episodes of pain for what he used acetaminophen. He doesn't smoke or drink alcohol. Denied family history of pancreatic cancer. He is S/P cholecystectomy. He was just discharge form the hospital with acute pancreatitis (2 weeks ago). This is his second episodes. Today he complaint of diffuse abdominal pain with nausea and diarrhea. Had a EUS today and was sent to surgical oncology clinic due to findings. He states that the abdominal pain increases when he had a large meal or eats sugar meals.        PAST MEDICAL HISTORY:  Past Medical History:   Diagnosis Date    Abdominal pain     Diarrhea     IPMN (intraductal papillary mucinous neoplasm)     Nausea     Pancreas cyst     Pancreatic duct dilated     Pancreatitis        PAST SURGICAL HISTORY:  Past Surgical History:   Procedure Laterality Date    APPENDECTOMY      INGUINAL HERNIA REPAIR      NISSEN FUNDOPLICATION         FAMILY HISTORY:  Mother had colon cancer at age 80  To ants with breast cancer and one with melanoma     ROS:  Review of Systems   Constitutional: Positive for activity change and appetite change.   Respiratory: Negative for apnea and  chest tightness.    Cardiovascular: Negative for chest pain and leg swelling.   Gastrointestinal: Negative for abdominal distention and abdominal pain.   Musculoskeletal: Negative for arthralgias and back pain.   Allergic/Immunologic: Negative for environmental allergies and food allergies.   Hematological: Negative for adenopathy. Does not bruise/bleed easily.       MEDICATIONS:     Medication List           Accurate as of October 3, 2019  1:15 PM. If you have any questions, ask your nurse or doctor.               CONTINUE taking these medications    acetaminophen 325 MG tablet  Commonly known as:  TYLENOL     CARAFATE 100 mg/mL suspension  Generic drug:  sucralfate     indomethacin 25 MG capsule  Commonly known as:  INDOCIN     omeprazole 20 MG capsule  Commonly known as:  PRILOSEC     STRESS FORMULA WITH IRON 500 mg-400 mcg- 18 mg iron Tab  Generic drug:  vit B comp-C-FA-iron-vit E     vitamin D 1000 units Tab  Commonly known as:  VITAMIN D3     ZOCOR 40 MG tablet  Generic drug:  simvastatin            SOCIAL HISTORY:  Social History     Socioeconomic History    Marital status:      Spouse name: Not on file    Number of children: Not on file    Years of education: Not on file    Highest education level: Not on file   Occupational History    Not on file   Social Needs    Financial resource strain: Not on file    Food insecurity:     Worry: Not on file     Inability: Not on file    Transportation needs:     Medical: Not on file     Non-medical: Not on file   Tobacco Use    Smoking status: Former Smoker     Packs/day: 1.00     Years: 10.00     Pack years: 10.00     Types: Cigarettes     Last attempt to quit: 1999     Years since quittin.7   Substance and Sexual Activity    Alcohol use: Not Currently     Frequency: Never    Drug use: Never    Sexual activity: Not on file   Lifestyle    Physical activity:     Days per week: Not on file     Minutes per session: Not on file    Stress: Not  on file   Relationships    Social connections:     Talks on phone: Not on file     Gets together: Not on file     Attends Muslim service: Not on file     Active member of club or organization: Not on file     Attends meetings of clubs or organizations: Not on file     Relationship status: Not on file   Other Topics Concern    Not on file   Social History Narrative    Not on file       RACE / BMI:  Race: White  BMI: There is no height or weight on file to calculate BMI.      ALLERGIES:  Review of patient's allergies indicates:   Allergen Reactions    Codeine Other (See Comments)     headache    Aspirin Rash    Penicillins Rash       RADIOGRAPHIC FINDINGS:  EUS 10/3/19  - A cyst was found in the visualized portion of the  liver and measured 52 mm by 47 mm.  - Pancreatic parenchymal abnormalities consisting of hyperechoic strands and lobularity were noted in  the entire pancreas.  - A cystic lesion was seen in the uncinate process of the pancreas. The diagnosis is a mixed intraductal papillary mucinous neoplasm.  - The pancreatic duct had a dilated endosonographic appearance in the pancreatic head. The pancreatic duct measured up to 12.3 mm in diameter.  - There was evidence of mucin extruding from the ampulla.    Abdominal CT scan: 9/12/19  3.1 cm cystic lesion arising from the main pancreatic duct in the pancreatic head/uncinate process.  Findings are concerning for primary cystic neoplastic process at such as IPMN.  There is associated common bile duct and pancreatic duct dilatation.  Recommend further evaluation with ERCP.  Numerous hypoattenuating lesions throughout the liver, largest consistent with simple cysts.  Right hepatic lobe hypodensity with peripheral nodular enhancement, likely represents hemangioma.      LABS:  Tumor Markers:  No results found for: CEA, AMYLASE, ASPIRATE, XJV607, , SN8014, AFP, AFPTM    Nutritional:  Lab Results   Component Value Date    ALBUMIN 3.8 09/12/2019        LFTs:  Lab Results   Component Value Date    ALBUMIN 3.8 09/12/2019    BILITOT 0.6 09/12/2019    AST 18 09/12/2019    PROT 8.3 09/12/2019       CMP:  Lab Results   Component Value Date    GLU 72 09/12/2019    CALCIUM 9.9 09/12/2019    ALBUMIN 3.8 09/12/2019    PROT 8.3 09/12/2019     09/12/2019    K 4.3 09/12/2019    CO2 20 (L) 09/12/2019     09/12/2019    BUN 18 09/12/2019    CREATININE 1.1 09/12/2019    ALKPHOS 85 09/12/2019    ALT 15 09/12/2019    AST 18 09/12/2019    BILITOT 0.6 09/12/2019         PHYSICAL EXAM:  Physical Exam   Constitutional: He appears well-developed and well-nourished.   HENT:   Head: Normocephalic and atraumatic.   Eyes: Pupils are equal, round, and reactive to light. Conjunctivae and EOM are normal.   Cardiovascular: Normal rate, regular rhythm and normal heart sounds.   Pulmonary/Chest: Effort normal and breath sounds normal.   Abdominal: Soft. Bowel sounds are normal.   Musculoskeletal: Normal range of motion.   Neurological: He is alert.   Skin: Skin is warm. Capillary refill takes less than 2 seconds.       ASSESSMENT/PLAN:  59 y.o male with history of side branched IPMN since 2013.   He was follow with the combination of EUS's and imaging which demonstrated evidence of stability.   However today's EUS showed dilatation of the pancreatic duct to 12.3mm and CT scan showed a 3.1cm cystic lesion.   Due to size bigger than 3cm, dilatation of the main duct, and the fact that the patient has become symptomatic, he will need resection to prevent future malignancy.      Plan:   - Will plan for a Whipple, surgical consent was reviewed and discussed with patient and wife.    - Will obtain labs including Ca 19-9 and a Chest CT.   -  Plan was discussed with patient, all questions were answered.     Humphrey Ramos MD   General Surgery PGY-I  Pager [phone]

## 2019-10-03 NOTE — H&P (VIEW-ONLY)
CHIEF COMPLAINT:  Abdominal     HPI:  This is a 59 y.o. male here for evaluation of pancreas cyst and abdominal pain. The patient is carrying a diagnosis of side branched IPMN since 2013 with multiple EUS's with FNA (CEA >200). FNA did not demonstrated evidence of malignancy. He was follow with the combination of EUS's and imaging which demonstrated evidence of stability to decreased size of the pancreas cyst. The cyst is located in the body of the pancreas and measured <3 cm. There is evidence of progressive dilation of the PD up to 8 mm. In 2017 he underwent pancreatoscopy for biopsies. The patient developed pancreatitis and since then he stated intermittent episodes of abdominal pain. He have two EUS's with celiac plexus block which improved his symptoms. He stated he is very functional except with the episodes of pain for what he used acetaminophen. He doesn't smoke or drink alcohol. Denied family history of pancreatic cancer. He is S/P cholecystectomy. He was just discharge form the hospital with acute pancreatitis (2 weeks ago). This is his second episodes. Today he complaint of diffuse abdominal pain with nausea and diarrhea. Had a EUS today and was sent to surgical oncology clinic due to findings. He states that the abdominal pain increases when he had a large meal or eats sugar meals.        PAST MEDICAL HISTORY:  Past Medical History:   Diagnosis Date    Abdominal pain     Diarrhea     IPMN (intraductal papillary mucinous neoplasm)     Nausea     Pancreas cyst     Pancreatic duct dilated     Pancreatitis        PAST SURGICAL HISTORY:  Past Surgical History:   Procedure Laterality Date    APPENDECTOMY      INGUINAL HERNIA REPAIR      NISSEN FUNDOPLICATION         FAMILY HISTORY:  Mother had colon cancer at age 80  To ants with breast cancer and one with melanoma     ROS:  Review of Systems   Constitutional: Positive for activity change and appetite change.   Respiratory: Negative for apnea and  chest tightness.    Cardiovascular: Negative for chest pain and leg swelling.   Gastrointestinal: Negative for abdominal distention and abdominal pain.   Musculoskeletal: Negative for arthralgias and back pain.   Allergic/Immunologic: Negative for environmental allergies and food allergies.   Hematological: Negative for adenopathy. Does not bruise/bleed easily.       MEDICATIONS:     Medication List           Accurate as of October 3, 2019  1:15 PM. If you have any questions, ask your nurse or doctor.               CONTINUE taking these medications    acetaminophen 325 MG tablet  Commonly known as:  TYLENOL     CARAFATE 100 mg/mL suspension  Generic drug:  sucralfate     indomethacin 25 MG capsule  Commonly known as:  INDOCIN     omeprazole 20 MG capsule  Commonly known as:  PRILOSEC     STRESS FORMULA WITH IRON 500 mg-400 mcg- 18 mg iron Tab  Generic drug:  vit B comp-C-FA-iron-vit E     vitamin D 1000 units Tab  Commonly known as:  VITAMIN D3     ZOCOR 40 MG tablet  Generic drug:  simvastatin            SOCIAL HISTORY:  Social History     Socioeconomic History    Marital status:      Spouse name: Not on file    Number of children: Not on file    Years of education: Not on file    Highest education level: Not on file   Occupational History    Not on file   Social Needs    Financial resource strain: Not on file    Food insecurity:     Worry: Not on file     Inability: Not on file    Transportation needs:     Medical: Not on file     Non-medical: Not on file   Tobacco Use    Smoking status: Former Smoker     Packs/day: 1.00     Years: 10.00     Pack years: 10.00     Types: Cigarettes     Last attempt to quit: 1999     Years since quittin.7   Substance and Sexual Activity    Alcohol use: Not Currently     Frequency: Never    Drug use: Never    Sexual activity: Not on file   Lifestyle    Physical activity:     Days per week: Not on file     Minutes per session: Not on file    Stress: Not  on file   Relationships    Social connections:     Talks on phone: Not on file     Gets together: Not on file     Attends Nondenominational service: Not on file     Active member of club or organization: Not on file     Attends meetings of clubs or organizations: Not on file     Relationship status: Not on file   Other Topics Concern    Not on file   Social History Narrative    Not on file       RACE / BMI:  Race: White  BMI: There is no height or weight on file to calculate BMI.      ALLERGIES:  Review of patient's allergies indicates:   Allergen Reactions    Codeine Other (See Comments)     headache    Aspirin Rash    Penicillins Rash       RADIOGRAPHIC FINDINGS:  EUS 10/3/19  - A cyst was found in the visualized portion of the  liver and measured 52 mm by 47 mm.  - Pancreatic parenchymal abnormalities consisting of hyperechoic strands and lobularity were noted in  the entire pancreas.  - A cystic lesion was seen in the uncinate process of the pancreas. The diagnosis is a mixed intraductal papillary mucinous neoplasm.  - The pancreatic duct had a dilated endosonographic appearance in the pancreatic head. The pancreatic duct measured up to 12.3 mm in diameter.  - There was evidence of mucin extruding from the ampulla.    Abdominal CT scan: 9/12/19  3.1 cm cystic lesion arising from the main pancreatic duct in the pancreatic head/uncinate process.  Findings are concerning for primary cystic neoplastic process at such as IPMN.  There is associated common bile duct and pancreatic duct dilatation.  Recommend further evaluation with ERCP.  Numerous hypoattenuating lesions throughout the liver, largest consistent with simple cysts.  Right hepatic lobe hypodensity with peripheral nodular enhancement, likely represents hemangioma.      LABS:  Tumor Markers:  No results found for: CEA, AMYLASE, ASPIRATE, HGC682, , FX2689, AFP, AFPTM    Nutritional:  Lab Results   Component Value Date    ALBUMIN 3.8 09/12/2019        LFTs:  Lab Results   Component Value Date    ALBUMIN 3.8 09/12/2019    BILITOT 0.6 09/12/2019    AST 18 09/12/2019    PROT 8.3 09/12/2019       CMP:  Lab Results   Component Value Date    GLU 72 09/12/2019    CALCIUM 9.9 09/12/2019    ALBUMIN 3.8 09/12/2019    PROT 8.3 09/12/2019     09/12/2019    K 4.3 09/12/2019    CO2 20 (L) 09/12/2019     09/12/2019    BUN 18 09/12/2019    CREATININE 1.1 09/12/2019    ALKPHOS 85 09/12/2019    ALT 15 09/12/2019    AST 18 09/12/2019    BILITOT 0.6 09/12/2019         PHYSICAL EXAM:  Physical Exam   Constitutional: He appears well-developed and well-nourished.   HENT:   Head: Normocephalic and atraumatic.   Eyes: Pupils are equal, round, and reactive to light. Conjunctivae and EOM are normal.   Cardiovascular: Normal rate, regular rhythm and normal heart sounds.   Pulmonary/Chest: Effort normal and breath sounds normal.   Abdominal: Soft. Bowel sounds are normal.   Musculoskeletal: Normal range of motion.   Neurological: He is alert.   Skin: Skin is warm. Capillary refill takes less than 2 seconds.       ASSESSMENT/PLAN:  59 y.o male with history of side branched IPMN since 2013.   He was follow with the combination of EUS's and imaging which demonstrated evidence of stability.   However today's EUS showed dilatation of the pancreatic duct to 12.3mm and CT scan showed a 3.1cm cystic lesion.   Due to size bigger than 3cm, dilatation of the main duct, and the fact that the patient has become symptomatic, he will need resection to prevent future malignancy.      Plan:   - Will plan for a Whipple, surgical consent was reviewed and discussed with patient and wife.    - Will obtain labs including Ca 19-9 and a Chest CT.   -  Plan was discussed with patient, all questions were answered.     Humphrey Ramos MD   General Surgery PGY-I  Pager [phone]

## 2019-10-03 NOTE — H&P (VIEW-ONLY)
History & Physical - Short Stay  Gastroenterology      SUBJECTIVE:     Procedure: EUS    Chief Complaint/Indication for Procedure: Pancreas cyst    History of Present Illness:  Patient is a 59 y.o. male presents with branched IPMN and episodes of acute recurrent pancreatitis and dilation of the here for evaluation. He si schedule to see Surgical Oncology this afternoon.    PTA Medications   Medication Sig    simvastatin (ZOCOR) 40 MG tablet Take 40 mg by mouth.    sucralfate (CARAFATE) 100 mg/mL suspension Take 1 g by mouth.    acetaminophen (TYLENOL) 325 MG tablet Take 650 mg by mouth.    indomethacin (INDOCIN) 25 MG capsule     omeprazole (PRILOSEC) 20 MG capsule Take 20 mg by mouth.    vit B comp-C-FA-iron-vit E (STRESS FORMULA WITH IRON) 500 mg-400 mcg- 18 mg iron Tab Take 1 tablet by mouth.    vitamin D (VITAMIN D3) 1000 units Tab Take 1,000 Units by mouth.       Review of patient's allergies indicates:   Allergen Reactions    Codeine Other (See Comments)     headache    Aspirin Rash    Penicillins Rash        Past Medical History:   Diagnosis Date    Abdominal pain     Diarrhea     IPMN (intraductal papillary mucinous neoplasm)     Nausea     Pancreas cyst     Pancreatic duct dilated     Pancreatitis      Past Surgical History:   Procedure Laterality Date    APPENDECTOMY       History reviewed. No pertinent family history.  Social History     Tobacco Use    Smoking status: Former Smoker     Packs/day: 1.00     Years: 10.00     Pack years: 10.00     Types: Cigarettes     Last attempt to quit: 1999     Years since quittin.7   Substance Use Topics    Alcohol use: Not Currently     Frequency: Never    Drug use: Never       Review of Systems:  Respiratory: no cough or shortness of breath  Cardiovascular: no chest pain or palpitations  Gastrointestinal: no nausea or vomiting, no abdominal pain or change in bowel habits    OBJECTIVE:     Vital Signs (Most Recent)  Temp: 97.5 °F (36.4  °C) (10/03/19 0821)  Pulse: 77 (10/03/19 0821)  Resp: 17 (10/03/19 0821)  BP: 117/79 (10/03/19 0821)  SpO2: 98 % (10/03/19 0821)    Physical Exam:  General: well developed, well nourished  Lungs:  clear to auscultation bilaterally and normal respiratory effort  Heart: regular rate, S1, S2 normal  Abdomen: soft, non-tender non-distented; bowel sounds normal; no masses,  no organomegaly    Laboratory  CBC: No results for input(s): WBC, RBC, HGB, HCT, PLT, MCV, MCH, MCHC in the last 168 hours.  CMP: No results for input(s): GLU, CALCIUM, ALBUMIN, PROT, NA, K, CO2, CL, BUN, CREATININE, ALKPHOS, ALT, AST, BILITOT in the last 168 hours.  Coagulation: No results for input(s): LABPROT, INR, APTT in the last 168 hours.      Diagnostic Results:    ASSESSMENT/PLAN:     Acute recurrent pancreatitis  Branched IPMN  Dilated PD    Plan: EUS    Anesthesia Plan: MAC    ASA Grade: ASA 3 - Patient with moderate systemic disease with functional limitations     The impression and plan was discussed in detail with the patient and family. All questions have been answered and the patient voices understanding of our plan at this point. The risk of the procedure was discussed in detail which includes but not limited to bleeding, infection, perforation in some cases requiring surgery with its spectrum of complications.

## 2019-10-04 NOTE — ANESTHESIA POSTPROCEDURE EVALUATION
Anesthesia Post Evaluation    Patient: Jose Rafael Campbell    Procedure(s) Performed: Procedure(s) (LRB):  ULTRASOUND, UPPER GI TRACT, ENDOSCOPIC (N/A)    Final Anesthesia Type: general  Patient location during evaluation: PACU  Patient participation: Yes- Able to Participate  Level of consciousness: awake and alert  Post-procedure vital signs: reviewed and stable  Pain management: adequate  Airway patency: patent  PONV status at discharge: No PONV  Anesthetic complications: no      Cardiovascular status: blood pressure returned to baseline and stable  Respiratory status: unassisted  Hydration status: euvolemic  Follow-up not needed.          Vitals Value Taken Time   /79 10/3/2019 10:16 AM   Temp 37 °C (98.6 °F) 10/3/2019  9:26 AM   Pulse 70 10/3/2019 10:18 AM   Resp 46 10/3/2019 10:18 AM   SpO2 98 % 10/3/2019 10:18 AM   Vitals shown include unvalidated device data.      No case tracking events are documented in the log.      Pain/Audi Score: Audi Score: 7 (10/3/2019  9:26 AM)

## 2019-10-07 ENCOUNTER — TUMOR BOARD CONFERENCE (OUTPATIENT)
Dept: SURGERY | Facility: CLINIC | Age: 59
End: 2019-10-07

## 2019-10-07 NOTE — PROGRESS NOTES
[facility] UGI MULTIDISCIPLINARY TUMOR BOARD  PATIENT REVIEW FORM   ____________________________________________________________    CLINIC #: [id number]  DATE: 10/7/2019    DIAGNOSIS: IPMN    PRESENTER: Paul King    PATIENT SUMMARY:   This 58 y/o gentleman with history of IPMN in the uncinate that has been followed appropriately for years. Recent increase in size as well as new PD dilation to 1.3cm. On EUS, he had mucin at the ampulla. Additionally he has developed intermittent pancreatitis. He is also beginning to experience some symptoms as well.     BOARD RECOMMENDATIONS:   Surgical resection    CONSULT NEEDED:     [x] Surgery    [] Hem/Onc    [] Rad/Onc    [] Dietary                 [] Social Service    [] Psychology       [] AES  [] Radiology      PRESENTATION AT CANCER CONFERENCE:         [x] Prospective    [] Retrospective     [] Follow-Up            [] Eligible for clinical trial

## 2019-10-22 ENCOUNTER — TELEPHONE (OUTPATIENT)
Dept: SURGERY | Facility: CLINIC | Age: 59
End: 2019-10-22

## 2019-10-22 NOTE — TELEPHONE ENCOUNTER
----- Message from Lani Dalal sent at 10/22/2019  2:37 PM CDT -----  Contact: Tiffany (wife)  Tiffany called to find out if the [facility] documents have been received because patients employer has not received documents.     Please call [phone] to discuss today.

## 2019-10-24 ENCOUNTER — TELEPHONE (OUTPATIENT)
Dept: SURGERY | Facility: CLINIC | Age: 59
End: 2019-10-24

## 2019-10-24 ENCOUNTER — ANESTHESIA EVENT (OUTPATIENT)
Dept: SURGERY | Facility: HOSPITAL | Age: 59
DRG: 406 | End: 2019-10-24
Payer: OTHER GOVERNMENT

## 2019-10-24 DIAGNOSIS — K86.2 PANCREAS CYST: Primary | ICD-10-CM

## 2019-10-24 RX ORDER — SODIUM CHLORIDE 9 MG/ML
INJECTION, SOLUTION INTRAVENOUS CONTINUOUS
Status: CANCELLED | OUTPATIENT
Start: 2019-10-24

## 2019-10-24 RX ORDER — ENOXAPARIN SODIUM 100 MG/ML
40 INJECTION SUBCUTANEOUS ONCE
Status: CANCELLED | OUTPATIENT
Start: 2019-10-24

## 2019-10-24 RX ORDER — TAMSULOSIN HYDROCHLORIDE 0.4 MG/1
0.4 CAPSULE ORAL DAILY
Status: CANCELLED | OUTPATIENT
Start: 2019-10-25

## 2019-10-24 RX ORDER — LIDOCAINE HYDROCHLORIDE 10 MG/ML
1 INJECTION, SOLUTION EPIDURAL; INFILTRATION; INTRACAUDAL; PERINEURAL ONCE
Status: CANCELLED | OUTPATIENT
Start: 2019-10-24 | End: 2019-10-24

## 2019-10-24 NOTE — ANESTHESIA PREPROCEDURE EVALUATION
[facility]  Anesthesia Pre-Operative Evaluation         Patient Name: Jose Rafael Campbell  YOB: 1960  MRN: [id number]    SUBJECTIVE:     Pre-operative evaluation for Procedure(s) (LRB):  WHIPPLE PROCEDURE (N/A)     10/24/2019    Jose Rafael Campbell is a 59 y.o. male w/ a significant PMHx of branched Intraductal Papillary Mucosal Neoplasms and episodes of acute recurrent pancreatitis with recent increase in size and new pancreatic duct dilatation to 12.3mm with 3.1cm cystic lesion.    Patient now presents for the above procedure(s).      Prev airway: None documented.          Patient Active Problem List   Diagnosis    Pancreas cyst       Review of patient's allergies indicates:   Allergen Reactions    Codeine Other (See Comments)     headache    Aspirin Rash    Penicillins Rash       Current Inpatient Medications:      No current facility-administered medications on file prior to encounter.      Current Outpatient Medications on File Prior to Encounter   Medication Sig Dispense Refill    acetaminophen (TYLENOL) 325 MG tablet Take 650 mg by mouth.      indomethacin (INDOCIN) 25 MG capsule       omeprazole (PRILOSEC) 20 MG capsule Take 20 mg by mouth.      simvastatin (ZOCOR) 40 MG tablet Take 40 mg by mouth.      sucralfate (CARAFATE) 100 mg/mL suspension Take 1 g by mouth.      vit B comp-C-FA-iron-vit E (STRESS FORMULA WITH IRON) 500 mg-400 mcg- 18 mg iron Tab Take 1 tablet by mouth.      vitamin D (VITAMIN D3) 1000 units Tab Take 1,000 Units by mouth.         Past Surgical History:   Procedure Laterality Date    APPENDECTOMY      ENDOSCOPIC ULTRASOUND OF UPPER GASTROINTESTINAL TRACT N/A 10/3/2019    Procedure: ULTRASOUND, UPPER GI TRACT, ENDOSCOPIC;  Surgeon: Harsh Clark MD;  Location: [address];  Service: Endoscopy;  Laterality: N/A;    INGUINAL HERNIA REPAIR      NISSEN FUNDOPLICATION         Social History     Socioeconomic History     Marital status:      Spouse name: Not on file    Number of children: Not on file    Years of education: Not on file    Highest education level: Not on file   Occupational History    Not on file   Social Needs    Financial resource strain: Not on file    Food insecurity:     Worry: Not on file     Inability: Not on file    Transportation needs:     Medical: Not on file     Non-medical: Not on file   Tobacco Use    Smoking status: Former Smoker     Packs/day: 1.00     Years: 10.00     Pack years: 10.00     Types: Cigarettes     Last attempt to quit: 1999     Years since quittin.7   Substance and Sexual Activity    Alcohol use: Not Currently     Frequency: Never    Drug use: Never    Sexual activity: Not on file   Lifestyle    Physical activity:     Days per week: Not on file     Minutes per session: Not on file    Stress: Not on file   Relationships    Social connections:     Talks on phone: Not on file     Gets together: Not on file     Attends Yarsani service: Not on file     Active member of club or organization: Not on file     Attends meetings of clubs or organizations: Not on file     Relationship status: Not on file   Other Topics Concern    Not on file   Social History Narrative    Not on file       OBJECTIVE:     Vital Signs Range (Last 24H):         Significant Labs:  Lab Results   Component Value Date    WBC 6.45 10/03/2019    HGB 13.3 (L) 10/03/2019    HCT 40.1 10/03/2019     10/03/2019    ALT 16 10/03/2019    AST 21 10/03/2019     10/03/2019    K 4.0 10/03/2019     10/03/2019    CREATININE 0.9 10/03/2019    BUN 21 10/03/2019    CO2 26 10/03/2019       Diagnostic Studies: No relevant studies.    EKG:   Results for orders placed or performed during the hospital encounter of 10/03/19   SCHEDULED EKG 12-LEAD (to Muse)    Collection Time: 10/03/19  3:21 PM    Narrative    Test Reason : K86.2,    Vent. Rate : 071 BPM     Atrial Rate : 071 BPM     P-R Int :  170 ms          QRS Dur : 082 ms      QT Int : 366 ms       P-R-T Axes : 028 051 034 degrees     QTc Int : 397 ms    Normal sinus rhythm  Normal ECG  No previous ECGs available  Confirmed by JIM LARRY MD ([id number]) on 10/3/2019 4:59:47 PM    Referred By: JOJO TREJO           Confirmed By:JIM LARRY MD       2D ECHO:  TTE:  No results found for this or any previous visit.    HOLLEY:  No results found for this or any previous visit.    ASSESSMENT/PLAN:         Anesthesia Evaluation    I have reviewed the Patient Summary Reports.    I have reviewed the Nursing Notes.   I have reviewed the Medications.     Review of Systems  Anesthesia Hx:  No problems with previous Anesthesia Denies Hx of Anesthetic complications  History of prior surgery of interest to airway management or planning:  Denies Personal Hx of Anesthesia complications.   Cardiovascular:   hyperlipidemia    Pulmonary:  Pulmonary Normal    Renal/:  Renal/ Normal     Hepatic/GI:   Recurrent pancreatitis   Neurological:  Neurology Normal    Endocrine:  Endocrine Normal        Physical Exam  General:  Well nourished    Airway/Jaw/Neck:  Airway Findings: Mouth Opening: Normal Tongue: Normal  General Airway Assessment: Adult  Mallampati: II  TM Distance: Normal, at least 6 cm      Dental:  Dental Findings: In tact   Chest/Lungs:  Chest/Lungs Findings: Clear to auscultation, Normal Respiratory Rate     Heart/Vascular:  Heart Findings: Rate: Normal  Rhythm: Regular Rhythm     Abdomen:  Abdomen Findings:  Normal, Soft, Nontender     Musculoskeletal:  Musculoskeletal Findings: Normal    Mental Status:  Mental Status Findings:  Cooperative, Alert and Oriented         Anesthesia Plan  Type of Anesthesia, risks & benefits discussed:  Anesthesia Type:  general  Patient's Preference:   Intra-op Monitoring Plan: arterial line and standard ASA monitors  Intra-op Monitoring Plan Comments:   Post Op Pain Control Plan: multimodal analgesia, IV/PO Opioids PRN and per primary  service following discharge from PACU  Post Op Pain Control Plan Comments:   Induction:   IV  Beta Blocker:  Patient is not currently on a Beta-Blocker (No further documentation required).       Informed Consent: Patient understands risks and agrees with Anesthesia plan.  Questions answered. Anesthesia consent signed with patient.  ASA Score: 3     Day of Surgery Review of History & Physical:    H&P update referred to the surgeon.         Ready For Surgery From Anesthesia Perspective.

## 2019-10-24 NOTE — TELEPHONE ENCOUNTER
Instructed to shower twice with hibiclens (pm and am), no food after midnight but okay to have clear liquids including 12 oz recover aid before 5 am.

## 2019-10-25 ENCOUNTER — ANESTHESIA (OUTPATIENT)
Dept: SURGERY | Facility: HOSPITAL | Age: 59
DRG: 406 | End: 2019-10-25
Payer: OTHER GOVERNMENT

## 2019-10-25 ENCOUNTER — HOSPITAL ENCOUNTER (INPATIENT)
Facility: HOSPITAL | Age: 59
LOS: 8 days | Discharge: HOME | DRG: 406 | End: 2019-11-02
Attending: SURGERY | Admitting: SURGERY
Payer: OTHER GOVERNMENT

## 2019-10-25 DIAGNOSIS — R07.9 CHEST PAIN: ICD-10-CM

## 2019-10-25 DIAGNOSIS — K86.2 PANCREAS CYST: Primary | ICD-10-CM

## 2019-10-25 LAB
ABO + RH BLD: NORMAL
ALBUMIN SERPL BCP-MCNC: 2.9 G/DL (ref 3.5–5.2)
ALP SERPL-CCNC: 44 U/L (ref 55–135)
ALT SERPL W/O P-5'-P-CCNC: 63 U/L (ref 10–44)
ANION GAP SERPL CALC-SCNC: 8 MMOL/L (ref 8–16)
AST SERPL-CCNC: 60 U/L (ref 10–40)
BASOPHILS # BLD AUTO: 0.03 K/UL (ref 0–0.2)
BASOPHILS NFR BLD: 0.2 % (ref 0–1.9)
BILIRUB SERPL-MCNC: 0.5 MG/DL (ref 0.1–1)
BLD GP AB SCN CELLS X3 SERPL QL: NORMAL
BUN SERPL-MCNC: 10 MG/DL (ref 8–23)
CALCIUM SERPL-MCNC: 7.2 MG/DL (ref 8.7–10.5)
CHLORIDE SERPL-SCNC: 109 MMOL/L (ref 95–110)
CO2 SERPL-SCNC: 23 MMOL/L (ref 23–29)
CREAT SERPL-MCNC: 0.8 MG/DL (ref 0.5–1.4)
DIFFERENTIAL METHOD: ABNORMAL
EOSINOPHIL # BLD AUTO: 0 K/UL (ref 0–0.5)
EOSINOPHIL NFR BLD: 0 % (ref 0–8)
ERYTHROCYTE [DISTWIDTH] IN BLOOD BY AUTOMATED COUNT: 13.2 % (ref 11.5–14.5)
EST. GFR  (AFRICAN AMERICAN): >60 ML/MIN/1.73 M^2
EST. GFR  (NON AFRICAN AMERICAN): >60 ML/MIN/1.73 M^2
GLUCOSE SERPL-MCNC: 157 MG/DL (ref 70–110)
GLUCOSE SERPL-MCNC: 169 MG/DL (ref 70–110)
GLUCOSE SERPL-MCNC: 181 MG/DL (ref 70–110)
HCO3 UR-SCNC: 22.2 MMOL/L (ref 24–28)
HCO3 UR-SCNC: 23.4 MMOL/L (ref 24–28)
HCT VFR BLD AUTO: 38 % (ref 40–54)
HCT VFR BLD CALC: 34 %PCV (ref 36–54)
HCT VFR BLD CALC: 35 %PCV (ref 36–54)
HGB BLD-MCNC: 12.7 G/DL (ref 14–18)
IMM GRANULOCYTES # BLD AUTO: 0.07 K/UL (ref 0–0.04)
IMM GRANULOCYTES NFR BLD AUTO: 0.4 % (ref 0–0.5)
LYMPHOCYTES # BLD AUTO: 0.4 K/UL (ref 1–4.8)
LYMPHOCYTES NFR BLD: 2.2 % (ref 18–48)
MAGNESIUM SERPL-MCNC: 1.9 MG/DL (ref 1.6–2.6)
MCH RBC QN AUTO: 31.2 PG (ref 27–31)
MCHC RBC AUTO-ENTMCNC: 33.4 G/DL (ref 32–36)
MCV RBC AUTO: 93 FL (ref 82–98)
MONOCYTES # BLD AUTO: 1.2 K/UL (ref 0.3–1)
MONOCYTES NFR BLD: 7.4 % (ref 4–15)
NEUTROPHILS # BLD AUTO: 14.6 K/UL (ref 1.8–7.7)
NEUTROPHILS NFR BLD: 89.8 % (ref 38–73)
NRBC BLD-RTO: 0 /100 WBC
PCO2 BLDA: 40.3 MMHG (ref 35–45)
PCO2 BLDA: 45.7 MMHG (ref 35–45)
PH SMN: 7.32 [PH] (ref 7.35–7.45)
PH SMN: 7.35 [PH] (ref 7.35–7.45)
PHOSPHATE SERPL-MCNC: 3 MG/DL (ref 2.7–4.5)
PLATELET # BLD AUTO: 258 K/UL (ref 150–350)
PMV BLD AUTO: 9.2 FL (ref 9.2–12.9)
PO2 BLDA: 69 MMHG (ref 80–100)
PO2 BLDA: 87 MMHG (ref 80–100)
POC BE: -3 MMOL/L
POC BE: -3 MMOL/L
POC IONIZED CALCIUM: 1.05 MMOL/L (ref 1.06–1.42)
POC IONIZED CALCIUM: 1.08 MMOL/L (ref 1.06–1.42)
POC SATURATED O2: 93 % (ref 95–100)
POC SATURATED O2: 96 % (ref 95–100)
POC TCO2: 23 MMOL/L (ref 23–27)
POC TCO2: 25 MMOL/L (ref 23–27)
POCT GLUCOSE: 170 MG/DL (ref 70–110)
POTASSIUM BLD-SCNC: 3.8 MMOL/L (ref 3.5–5.1)
POTASSIUM BLD-SCNC: 3.9 MMOL/L (ref 3.5–5.1)
POTASSIUM SERPL-SCNC: 4.3 MMOL/L (ref 3.5–5.1)
PROT SERPL-MCNC: 5 G/DL (ref 6–8.4)
RBC # BLD AUTO: 4.07 M/UL (ref 4.6–6.2)
SAMPLE: ABNORMAL
SAMPLE: ABNORMAL
SODIUM BLD-SCNC: 138 MMOL/L (ref 136–145)
SODIUM BLD-SCNC: 140 MMOL/L (ref 136–145)
SODIUM SERPL-SCNC: 140 MMOL/L (ref 136–145)
WBC # BLD AUTO: 16.3 K/UL (ref 3.9–12.7)

## 2019-10-25 PROCEDURE — 36415 COLL VENOUS BLD VENIPUNCTURE: CPT

## 2019-10-25 PROCEDURE — 86901 BLOOD TYPING SEROLOGIC RH(D): CPT

## 2019-10-25 PROCEDURE — 25000003 PHARM REV CODE 250: Performed by: STUDENT IN AN ORGANIZED HEALTH CARE EDUCATION/TRAINING PROGRAM

## 2019-10-25 PROCEDURE — 36000708 HC OR TIME LEV III 1ST 15 MIN: Performed by: SURGERY

## 2019-10-25 PROCEDURE — 88305 TISSUE EXAM BY PATHOLOGIST: CPT | Mod: 26,,, | Performed by: PATHOLOGY

## 2019-10-25 PROCEDURE — 88307 TISSUE SPECIMEN TO PATHOLOGY - SURGERY: ICD-10-PCS | Mod: 26,,, | Performed by: PATHOLOGY

## 2019-10-25 PROCEDURE — 94761 N-INVAS EAR/PLS OXIMETRY MLT: CPT

## 2019-10-25 PROCEDURE — 82962 GLUCOSE BLOOD TEST: CPT | Performed by: SURGERY

## 2019-10-25 PROCEDURE — 25000003 PHARM REV CODE 250: Performed by: SURGERY

## 2019-10-25 PROCEDURE — 88307 TISSUE EXAM BY PATHOLOGIST: CPT | Mod: 26,,, | Performed by: PATHOLOGY

## 2019-10-25 PROCEDURE — 88305 TISSUE EXAM BY PATHOLOGIST: CPT | Performed by: PATHOLOGY

## 2019-10-25 PROCEDURE — 86920 COMPATIBILITY TEST SPIN: CPT

## 2019-10-25 PROCEDURE — 27201423 OPTIME MED/SURG SUP & DEVICES STERILE SUPPLY: Performed by: SURGERY

## 2019-10-25 PROCEDURE — 36000709 HC OR TIME LEV III EA ADD 15 MIN: Performed by: SURGERY

## 2019-10-25 PROCEDURE — D9220A PRA ANESTHESIA: ICD-10-PCS | Mod: ,,, | Performed by: ANESTHESIOLOGY

## 2019-10-25 PROCEDURE — 88304 TISSUE EXAM BY PATHOLOGIST: CPT | Mod: 26,,, | Performed by: PATHOLOGY

## 2019-10-25 PROCEDURE — 20600001 HC STEP DOWN PRIVATE ROOM

## 2019-10-25 PROCEDURE — 88331 PATH CONSLTJ SURG 1 BLK 1SPC: CPT | Mod: 26,,, | Performed by: PATHOLOGY

## 2019-10-25 PROCEDURE — 88304 TISSUE SPECIMEN TO PATHOLOGY - SURGERY: ICD-10-PCS | Mod: 26,,, | Performed by: PATHOLOGY

## 2019-10-25 PROCEDURE — D9220A PRA ANESTHESIA: Mod: ,,, | Performed by: ANESTHESIOLOGY

## 2019-10-25 PROCEDURE — 83735 ASSAY OF MAGNESIUM: CPT

## 2019-10-25 PROCEDURE — 63600175 PHARM REV CODE 636 W HCPCS: Performed by: STUDENT IN AN ORGANIZED HEALTH CARE EDUCATION/TRAINING PROGRAM

## 2019-10-25 PROCEDURE — 84100 ASSAY OF PHOSPHORUS: CPT

## 2019-10-25 PROCEDURE — 63600175 PHARM REV CODE 636 W HCPCS: Performed by: SURGERY

## 2019-10-25 PROCEDURE — C1729 CATH, DRAINAGE: HCPCS | Performed by: SURGERY

## 2019-10-25 PROCEDURE — 27000221 HC OXYGEN, UP TO 24 HOURS

## 2019-10-25 PROCEDURE — 88331 TISSUE SPECIMEN TO PATHOLOGY - SURGERY: ICD-10-PCS | Mod: 26,,, | Performed by: PATHOLOGY

## 2019-10-25 PROCEDURE — 71000033 HC RECOVERY, INTIAL HOUR: Performed by: SURGERY

## 2019-10-25 PROCEDURE — 71000039 HC RECOVERY, EACH ADD'L HOUR: Performed by: SURGERY

## 2019-10-25 PROCEDURE — 37000009 HC ANESTHESIA EA ADD 15 MINS: Performed by: SURGERY

## 2019-10-25 PROCEDURE — 88309 TISSUE SPECIMEN TO PATHOLOGY - SURGERY: ICD-10-PCS | Mod: 26,,, | Performed by: PATHOLOGY

## 2019-10-25 PROCEDURE — 88309 TISSUE EXAM BY PATHOLOGIST: CPT | Mod: 26,,, | Performed by: PATHOLOGY

## 2019-10-25 PROCEDURE — 88305 TISSUE SPECIMEN TO PATHOLOGY - SURGERY: ICD-10-PCS | Mod: 26,,, | Performed by: PATHOLOGY

## 2019-10-25 PROCEDURE — C9290 INJ, BUPIVACAINE LIPOSOME: HCPCS | Performed by: SURGERY

## 2019-10-25 PROCEDURE — 80053 COMPREHEN METABOLIC PANEL: CPT

## 2019-10-25 PROCEDURE — 94799 UNLISTED PULMONARY SVC/PX: CPT

## 2019-10-25 PROCEDURE — 85025 COMPLETE CBC W/AUTO DIFF WBC: CPT

## 2019-10-25 PROCEDURE — 37000008 HC ANESTHESIA 1ST 15 MINUTES: Performed by: SURGERY

## 2019-10-25 RX ORDER — KETAMINE HCL IN 0.9 % NACL 50 MG/5 ML
SYRINGE (ML) INTRAVENOUS
Status: DISCONTINUED | OUTPATIENT
Start: 2019-10-25 | End: 2019-10-25

## 2019-10-25 RX ORDER — PHENYLEPHRINE HYDROCHLORIDE 10 MG/ML
INJECTION INTRAVENOUS
Status: DISCONTINUED | OUTPATIENT
Start: 2019-10-25 | End: 2019-10-25

## 2019-10-25 RX ORDER — MUPIROCIN 20 MG/G
1 OINTMENT TOPICAL 2 TIMES DAILY
Status: DISPENSED | OUTPATIENT
Start: 2019-10-25 | End: 2019-10-30

## 2019-10-25 RX ORDER — NALOXONE HCL 0.4 MG/ML
0.02 VIAL (ML) INJECTION
Status: DISCONTINUED | OUTPATIENT
Start: 2019-10-25 | End: 2019-11-02 | Stop reason: HOSPADM

## 2019-10-25 RX ORDER — HYDROMORPHONE HCL IN 0.9% NACL 6 MG/30 ML
PATIENT CONTROLLED ANALGESIA SYRINGE INTRAVENOUS CONTINUOUS
Status: DISCONTINUED | OUTPATIENT
Start: 2019-10-25 | End: 2019-10-29

## 2019-10-25 RX ORDER — ONDANSETRON 2 MG/ML
INJECTION INTRAMUSCULAR; INTRAVENOUS
Status: DISCONTINUED | OUTPATIENT
Start: 2019-10-25 | End: 2019-10-25

## 2019-10-25 RX ORDER — DEXAMETHASONE SODIUM PHOSPHATE 4 MG/ML
INJECTION, SOLUTION INTRA-ARTICULAR; INTRALESIONAL; INTRAMUSCULAR; INTRAVENOUS; SOFT TISSUE
Status: DISCONTINUED | OUTPATIENT
Start: 2019-10-25 | End: 2019-10-25

## 2019-10-25 RX ORDER — LIDOCAINE HCL/PF 100 MG/5ML
SYRINGE (ML) INTRAVENOUS
Status: DISCONTINUED | OUTPATIENT
Start: 2019-10-25 | End: 2019-10-25

## 2019-10-25 RX ORDER — PROPOFOL 10 MG/ML
VIAL (ML) INTRAVENOUS
Status: DISCONTINUED | OUTPATIENT
Start: 2019-10-25 | End: 2019-10-25

## 2019-10-25 RX ORDER — SODIUM CHLORIDE AND POTASSIUM CHLORIDE 150; 900 MG/100ML; MG/100ML
INJECTION, SOLUTION INTRAVENOUS CONTINUOUS
Status: DISCONTINUED | OUTPATIENT
Start: 2019-10-25 | End: 2019-10-30

## 2019-10-25 RX ORDER — BUPIVACAINE HYDROCHLORIDE 2.5 MG/ML
INJECTION, SOLUTION EPIDURAL; INFILTRATION; INTRACAUDAL
Status: DISCONTINUED | OUTPATIENT
Start: 2019-10-25 | End: 2019-10-25 | Stop reason: HOSPADM

## 2019-10-25 RX ORDER — CIPROFLOXACIN 2 MG/ML
400 INJECTION, SOLUTION INTRAVENOUS
Status: COMPLETED | OUTPATIENT
Start: 2019-10-25 | End: 2019-10-25

## 2019-10-25 RX ORDER — TAMSULOSIN HYDROCHLORIDE 0.4 MG/1
0.4 CAPSULE ORAL DAILY
Status: DISCONTINUED | OUTPATIENT
Start: 2019-10-25 | End: 2019-10-25

## 2019-10-25 RX ORDER — FENTANYL CITRATE 50 UG/ML
25 INJECTION, SOLUTION INTRAMUSCULAR; INTRAVENOUS EVERY 5 MIN PRN
Status: DISCONTINUED | OUTPATIENT
Start: 2019-10-25 | End: 2019-10-25 | Stop reason: HOSPADM

## 2019-10-25 RX ORDER — FENTANYL CITRATE 50 UG/ML
INJECTION, SOLUTION INTRAMUSCULAR; INTRAVENOUS
Status: DISCONTINUED | OUTPATIENT
Start: 2019-10-25 | End: 2019-10-25

## 2019-10-25 RX ORDER — LEVALBUTEROL INHALATION SOLUTION 0.63 MG/3ML
0.63 SOLUTION RESPIRATORY (INHALATION)
Status: DISCONTINUED | OUTPATIENT
Start: 2019-10-26 | End: 2019-11-02 | Stop reason: HOSPADM

## 2019-10-25 RX ORDER — ACETAMINOPHEN 10 MG/ML
INJECTION, SOLUTION INTRAVENOUS
Status: DISCONTINUED | OUTPATIENT
Start: 2019-10-25 | End: 2019-10-25

## 2019-10-25 RX ORDER — DIPHENHYDRAMINE HYDROCHLORIDE 50 MG/ML
12.5 INJECTION INTRAMUSCULAR; INTRAVENOUS EVERY 4 HOURS PRN
Status: DISCONTINUED | OUTPATIENT
Start: 2019-10-25 | End: 2019-11-02 | Stop reason: HOSPADM

## 2019-10-25 RX ORDER — SODIUM CHLORIDE 0.9 % (FLUSH) 0.9 %
10 SYRINGE (ML) INJECTION
Status: DISCONTINUED | OUTPATIENT
Start: 2019-10-25 | End: 2019-11-02 | Stop reason: HOSPADM

## 2019-10-25 RX ORDER — SODIUM CHLORIDE 9 MG/ML
INJECTION, SOLUTION INTRAVENOUS CONTINUOUS
Status: DISCONTINUED | OUTPATIENT
Start: 2019-10-25 | End: 2019-10-25

## 2019-10-25 RX ORDER — VANCOMYCIN HCL IN 5 % DEXTROSE 1G/250ML
1000 PLASTIC BAG, INJECTION (ML) INTRAVENOUS
Status: COMPLETED | OUTPATIENT
Start: 2019-10-25 | End: 2019-10-25

## 2019-10-25 RX ORDER — ESMOLOL HYDROCHLORIDE 10 MG/ML
INJECTION INTRAVENOUS
Status: DISCONTINUED | OUTPATIENT
Start: 2019-10-25 | End: 2019-10-25

## 2019-10-25 RX ORDER — LIDOCAINE HYDROCHLORIDE 10 MG/ML
1 INJECTION, SOLUTION EPIDURAL; INFILTRATION; INTRACAUDAL; PERINEURAL ONCE
Status: COMPLETED | OUTPATIENT
Start: 2019-10-25 | End: 2019-10-25

## 2019-10-25 RX ORDER — ENOXAPARIN SODIUM 100 MG/ML
40 INJECTION SUBCUTANEOUS ONCE
Status: COMPLETED | OUTPATIENT
Start: 2019-10-25 | End: 2019-10-25

## 2019-10-25 RX ORDER — VASOPRESSIN 20 [USP'U]/ML
INJECTION, SOLUTION INTRAMUSCULAR; SUBCUTANEOUS
Status: DISCONTINUED | OUTPATIENT
Start: 2019-10-25 | End: 2019-10-25

## 2019-10-25 RX ORDER — ONDANSETRON 2 MG/ML
8 INJECTION INTRAMUSCULAR; INTRAVENOUS EVERY 6 HOURS PRN
Status: DISCONTINUED | OUTPATIENT
Start: 2019-10-25 | End: 2019-11-02 | Stop reason: HOSPADM

## 2019-10-25 RX ORDER — SODIUM CHLORIDE 9 MG/ML
INJECTION, SOLUTION INTRAVENOUS CONTINUOUS PRN
Status: DISCONTINUED | OUTPATIENT
Start: 2019-10-25 | End: 2019-10-25

## 2019-10-25 RX ORDER — ROCURONIUM BROMIDE 10 MG/ML
INJECTION, SOLUTION INTRAVENOUS
Status: DISCONTINUED | OUTPATIENT
Start: 2019-10-25 | End: 2019-10-25

## 2019-10-25 RX ORDER — MIDAZOLAM HYDROCHLORIDE 1 MG/ML
INJECTION, SOLUTION INTRAMUSCULAR; INTRAVENOUS
Status: DISCONTINUED | OUTPATIENT
Start: 2019-10-25 | End: 2019-10-25

## 2019-10-25 RX ORDER — NEOSTIGMINE METHYLSULFATE 1 MG/ML
INJECTION, SOLUTION INTRAVENOUS
Status: DISCONTINUED | OUTPATIENT
Start: 2019-10-25 | End: 2019-10-25

## 2019-10-25 RX ORDER — GLYCOPYRROLATE 0.2 MG/ML
INJECTION INTRAMUSCULAR; INTRAVENOUS
Status: DISCONTINUED | OUTPATIENT
Start: 2019-10-25 | End: 2019-10-25

## 2019-10-25 RX ADMIN — ROCURONIUM BROMIDE 10 MG: 10 INJECTION, SOLUTION INTRAVENOUS at 01:10

## 2019-10-25 RX ADMIN — ROCURONIUM BROMIDE 20 MG: 10 INJECTION, SOLUTION INTRAVENOUS at 08:10

## 2019-10-25 RX ADMIN — MIDAZOLAM HYDROCHLORIDE 2 MG: 1 INJECTION, SOLUTION INTRAMUSCULAR; INTRAVENOUS at 06:10

## 2019-10-25 RX ADMIN — PHENYLEPHRINE HYDROCHLORIDE 100 MCG: 10 INJECTION INTRAVENOUS at 08:10

## 2019-10-25 RX ADMIN — CALCIUM CHLORIDE 250 MG: 100 INJECTION, SOLUTION INTRAVENOUS at 11:10

## 2019-10-25 RX ADMIN — ROCURONIUM BROMIDE 10 MG: 10 INJECTION, SOLUTION INTRAVENOUS at 11:10

## 2019-10-25 RX ADMIN — SODIUM CHLORIDE, SODIUM GLUCONATE, SODIUM ACETATE, POTASSIUM CHLORIDE, MAGNESIUM CHLORIDE, SODIUM PHOSPHATE, DIBASIC, AND POTASSIUM PHOSPHATE: .53; .5; .37; .037; .03; .012; .00082 INJECTION, SOLUTION INTRAVENOUS at 07:10

## 2019-10-25 RX ADMIN — LIDOCAINE HYDROCHLORIDE 2 MG: 10 INJECTION, SOLUTION EPIDURAL; INFILTRATION; INTRACAUDAL; PERINEURAL at 06:10

## 2019-10-25 RX ADMIN — PHENYLEPHRINE HYDROCHLORIDE 100 MCG: 10 INJECTION INTRAVENOUS at 02:10

## 2019-10-25 RX ADMIN — PHENYLEPHRINE HYDROCHLORIDE 100 MCG: 10 INJECTION INTRAVENOUS at 11:10

## 2019-10-25 RX ADMIN — Medication: at 03:10

## 2019-10-25 RX ADMIN — VANCOMYCIN HYDROCHLORIDE 1000 MG: 1 INJECTION, POWDER, FOR SOLUTION INTRAVENOUS at 07:10

## 2019-10-25 RX ADMIN — ROCURONIUM BROMIDE 10 MG: 10 INJECTION, SOLUTION INTRAVENOUS at 09:10

## 2019-10-25 RX ADMIN — SODIUM CHLORIDE, SODIUM GLUCONATE, SODIUM ACETATE, POTASSIUM CHLORIDE, MAGNESIUM CHLORIDE, SODIUM PHOSPHATE, DIBASIC, AND POTASSIUM PHOSPHATE: .53; .5; .37; .037; .03; .012; .00082 INJECTION, SOLUTION INTRAVENOUS at 10:10

## 2019-10-25 RX ADMIN — SODIUM CHLORIDE 0.5 MCG/KG/MIN: 9 INJECTION, SOLUTION INTRAVENOUS at 08:10

## 2019-10-25 RX ADMIN — LIDOCAINE HYDROCHLORIDE 100 MG: 20 INJECTION, SOLUTION INTRAVENOUS at 07:10

## 2019-10-25 RX ADMIN — PHENYLEPHRINE HYDROCHLORIDE 200 MCG: 10 INJECTION INTRAVENOUS at 07:10

## 2019-10-25 RX ADMIN — ONDANSETRON 4 MG: 2 INJECTION INTRAMUSCULAR; INTRAVENOUS at 02:10

## 2019-10-25 RX ADMIN — NEOSTIGMINE METHYLSULFATE 4 MG: 1 INJECTION INTRAVENOUS at 02:10

## 2019-10-25 RX ADMIN — Medication 10 MG: at 09:10

## 2019-10-25 RX ADMIN — Medication 10 MG: at 10:10

## 2019-10-25 RX ADMIN — PHENYLEPHRINE HYDROCHLORIDE 100 MCG: 10 INJECTION INTRAVENOUS at 12:10

## 2019-10-25 RX ADMIN — SODIUM CHLORIDE: 0.9 INJECTION, SOLUTION INTRAVENOUS at 07:10

## 2019-10-25 RX ADMIN — TAMSULOSIN HYDROCHLORIDE 0.4 MG: 0.4 CAPSULE ORAL at 06:10

## 2019-10-25 RX ADMIN — ACETAMINOPHEN 1000 MG: 10 INJECTION, SOLUTION INTRAVENOUS at 07:10

## 2019-10-25 RX ADMIN — CALCIUM GLUCONATE 1000 MG: 98 INJECTION, SOLUTION INTRAVENOUS at 10:10

## 2019-10-25 RX ADMIN — SODIUM CHLORIDE, SODIUM GLUCONATE, SODIUM ACETATE, POTASSIUM CHLORIDE, MAGNESIUM CHLORIDE, SODIUM PHOSPHATE, DIBASIC, AND POTASSIUM PHOSPHATE: .53; .5; .37; .037; .03; .012; .00082 INJECTION, SOLUTION INTRAVENOUS at 08:10

## 2019-10-25 RX ADMIN — SUGAMMADEX 200 MG: 100 INJECTION, SOLUTION INTRAVENOUS at 02:10

## 2019-10-25 RX ADMIN — FENTANYL CITRATE 25 MCG: 50 INJECTION, SOLUTION INTRAMUSCULAR; INTRAVENOUS at 10:10

## 2019-10-25 RX ADMIN — CIPROFLOXACIN 400 MG: 2 INJECTION, SOLUTION INTRAVENOUS at 07:10

## 2019-10-25 RX ADMIN — FENTANYL CITRATE 50 MCG: 50 INJECTION, SOLUTION INTRAMUSCULAR; INTRAVENOUS at 12:10

## 2019-10-25 RX ADMIN — Medication 20 MG: at 07:10

## 2019-10-25 RX ADMIN — Medication 10 MG: at 08:10

## 2019-10-25 RX ADMIN — PROPOFOL 150 MG: 10 INJECTION, EMULSION INTRAVENOUS at 07:10

## 2019-10-25 RX ADMIN — ROCURONIUM BROMIDE 10 MG: 10 INJECTION, SOLUTION INTRAVENOUS at 12:10

## 2019-10-25 RX ADMIN — SODIUM CHLORIDE AND POTASSIUM CHLORIDE: .9; .15 SOLUTION INTRAVENOUS at 03:10

## 2019-10-25 RX ADMIN — PHENYLEPHRINE HYDROCHLORIDE 50 MCG: 10 INJECTION INTRAVENOUS at 09:10

## 2019-10-25 RX ADMIN — VASOPRESSIN 1 UNITS: 20 INJECTION INTRAVENOUS at 01:10

## 2019-10-25 RX ADMIN — ROCURONIUM BROMIDE 50 MG: 10 INJECTION, SOLUTION INTRAVENOUS at 07:10

## 2019-10-25 RX ADMIN — FENTANYL CITRATE 50 MCG: 50 INJECTION, SOLUTION INTRAMUSCULAR; INTRAVENOUS at 11:10

## 2019-10-25 RX ADMIN — SODIUM CHLORIDE: 0.9 INJECTION, SOLUTION INTRAVENOUS at 06:10

## 2019-10-25 RX ADMIN — SODIUM CHLORIDE, SODIUM GLUCONATE, SODIUM ACETATE, POTASSIUM CHLORIDE, MAGNESIUM CHLORIDE, SODIUM PHOSPHATE, DIBASIC, AND POTASSIUM PHOSPHATE: .53; .5; .37; .037; .03; .012; .00082 INJECTION, SOLUTION INTRAVENOUS at 11:10

## 2019-10-25 RX ADMIN — DEXAMETHASONE SODIUM PHOSPHATE 4 MG: 4 INJECTION, SOLUTION INTRAMUSCULAR; INTRAVENOUS at 07:10

## 2019-10-25 RX ADMIN — PHENYLEPHRINE HYDROCHLORIDE 100 MCG: 10 INJECTION INTRAVENOUS at 01:10

## 2019-10-25 RX ADMIN — FENTANYL CITRATE 100 MCG: 50 INJECTION, SOLUTION INTRAMUSCULAR; INTRAVENOUS at 07:10

## 2019-10-25 RX ADMIN — FENTANYL CITRATE 50 MCG: 50 INJECTION, SOLUTION INTRAMUSCULAR; INTRAVENOUS at 09:10

## 2019-10-25 RX ADMIN — GLYCOPYRROLATE 0.4 MG: 0.2 INJECTION, SOLUTION INTRAMUSCULAR; INTRAVENOUS at 02:10

## 2019-10-25 RX ADMIN — ENOXAPARIN SODIUM 40 MG: 100 INJECTION SUBCUTANEOUS at 06:10

## 2019-10-25 RX ADMIN — ROCURONIUM BROMIDE 10 MG: 10 INJECTION, SOLUTION INTRAVENOUS at 07:10

## 2019-10-25 RX ADMIN — PHENYLEPHRINE HYDROCHLORIDE 200 MCG: 10 INJECTION INTRAVENOUS at 01:10

## 2019-10-25 RX ADMIN — ESMOLOL HYDROCHLORIDE 10 MG: 10 INJECTION, SOLUTION INTRAVENOUS at 01:10

## 2019-10-25 RX ADMIN — SODIUM CHLORIDE, SODIUM GLUCONATE, SODIUM ACETATE, POTASSIUM CHLORIDE, MAGNESIUM CHLORIDE, SODIUM PHOSPHATE, DIBASIC, AND POTASSIUM PHOSPHATE: .53; .5; .37; .037; .03; .012; .00082 INJECTION, SOLUTION INTRAVENOUS at 01:10

## 2019-10-25 RX ADMIN — VANCOMYCIN HYDROCHLORIDE 1000 MG: 1 INJECTION, POWDER, FOR SOLUTION INTRAVENOUS at 06:10

## 2019-10-25 NOTE — NURSING TRANSFER
Nursing Transfer Note      10/25/2019     Transfer To: 1029A    Transfer via bed    Transfer with 2 L NC to O2, cardiac monitoring    Transported by PCT and RN    Medicines sent: NA    Chart send with patient: Yes    Notified: spouse    Patient reassessed at: 10/25/19 @ 1800

## 2019-10-25 NOTE — INTERVAL H&P NOTE
The patient has been examined and the H&P has been reviewed:    I concur with the findings and no changes have occurred since H&P was written.    Anesthesia/Surgery risks, benefits and alternative options discussed and understood by patient/family.          Active Hospital Problems    Diagnosis  POA    Pancreas cyst [K86.2]  Yes      Resolved Hospital Problems   No resolved problems to display.

## 2019-10-25 NOTE — OP NOTE
[facility]  Surgery Department  Operative Note       Date of Procedure: 10/25/2019     Procedure: Procedure(s) (LRB):  WHIPPLE PROCEDURE (N/A)     Surgeon(s) and Role:     * Paul King MD - Primary     * Jono Martinez MD - Resident - Assisting     * Humphrey Ramos MD - Resident - Assisting    Pre-Operative Diagnosis: Pancreas cyst [K86.2]  1. Main duct IPMN    Pre-Operative Variables:  Preoperative diagnosis: Main duct IPMN  Going into surgery, the lesion was believed to be resectable  Chemotherapy within 90 Days: N  Radiation Therapy within 90 Days: B  Preoperative Obstructive Jaundice: N  Preoperative Biliary Stent: N    Post-Operative Diagnosis:   1. Same    Anesthesia: General    Operative Findings:   1. No evidence of distant intraperitoneal metastases   2. Frozen sections of bile duct and pancreatic margins negative   3. Pancreas remnant: soft, duct: 10 mm  4. PD stent:  N  5. Bile duct:  10 mm, thin-walled, bile: clear  6. Resection status:  R0 pending final pathology. No gross disease remaining post dissection.  7. Standard running duct-to-mucosa hepaticojejunostomy, interrupted duct-to-mucosa pancreaticojejunostomy modified Blumgart technique, stapled posterior wall loop gastrojejunostomy    Procedures:  1. Pancreaticoduodenectomy (Whipple Procedure), non-pylorus preserving  2. Cholecystectomy  3. Common Bile Duct Exploration   4. Omentectomy    Estimated Blood Loss (EBL):  400 mL           Indications:  Jose Rafael Campbell presents for the above procedures.  Risks and benefits were reviewed including bleeding, infection, damage to local structures, anastomotic leak, need for additional procedures, death, and imponderables.  He understands and gave informed consent to proceed.    Details:  The patient was transported to the operating room and satisfactory anesthesia established.  Preoperative antibiotics were administered.  The patient was placed in the supine  position and extremities were padded and protected throughout.  A lomeli catheter was placed.      The operative field was prepped and draped in sterile fashion. A timeout was performed. A supraumbilical midline incision was used to gain access to the peritoneal cavity. The round ligament was divided flush with the anterior abdominal wall between ties. The falciform ligament was divided up and over the liver and the round ligament/falciform tucked away for later use as a pedicled flap. The abdomen was explored. There was no evidence of hepatic, visceral, or intraperitoneal metastases. No ascites. There was a palpable cystic mass in the head of the pancreas.     The omentum was elevated and  from the transverse colon throughout its length. Because of his previous open appendectomy he had a very large, thickened omentum that was scarred down to the deep right lower quadrant. To facilitate mobilization of the right colon and exposure, this omentum was carefully mobilized up into the field and off of the right and transverse colon, for which it was sort of folded over and scarred down to the central mesentery. The omentum was beat up after this and extraordinarily bulky, so it was mobilized off of the colon and resected.    The lesser sac was entered. There was no evidence of involvement of the lesser sac. The right colon was mobilized medially and the hepatic flexure taken down. A wide Kocher maneuver was performed,  the duodenum from the retroperitoneum and exposing the IVC, left renal vein. The mass did not appear to grossly involve the superior mesenteric artery. The middle colic vein was identified and traced to its junction with the right gastroepiploic vein and the gastrocolic trunk. The gastrocolic venous trunk was divided with silk ties, further exposing the infrapancreatic SMV.    The infrapancreatic SMV was dissected from the undersurface of the pancreas to create a tunnel. The stomach was  then divided with the endo-GYPSY stapler proximal to the pylorus and tucked in the left upper quadrant. The hepatic artery was identified and traced to the gastroduodenal artery which was doubly ligated with a tie and suture and divided after confirming palpable pulse in the distal hepatic artery after clamping. The portal vein was identified as it exited the superior border of the pancreas and the infrapancreatic tunnel was completed.     The gallbaldder was removed in top down fashion and the cystic duct and artery divided. The bile duct was encircled with a vessel loop. The bile duct was then divided and brisk flow of bile and sludge was observed. The common duct was explored with a clamp probing deeply into to the right and left ducts which were then cannulated and flushed copiously with saline until clear bile was expelled.     The transverse mesocolon was then elevated and the ligament of Treitz identified. The proximal jejunum was divided and its mesentery divided with the ligasure until the jejunum could be brought through the defect.      The pancreatic neck was divided with electrocautery over the SMV/PV. The superior mesenteric vein and portal vein were not grossly involved with tumor, and  easily from the specimen. The SMA was identified and dissected away from the specimen The majority of this was done with the Ligasure, ligating the IPDAs with silk.       This freed the specimen, which was oriented and inked on the back table. Sections were obtained of the common hepatic duct and pancreatic margins. These would both return as nonmalignant.     Standard Child's (B-II) reconstruction was performed with a retrocolic end to side, duct-to-mucosa Blumgart pancreaticojejunostomy, end to side hepaticojejunostomy followed by an antecolic side to side stapled gastrojejunostomy. This was all done with loupe magnification.     The proximal jejunum was brought through a mesocolic window to the right of the  middle colic vessels. The pancreas was soft, the duct was 10 mm. 3-0 vicryl transpancreatic mattress stitches were placed between the pancreas and jejunum, avoiding the pancreatic duct, forming the posterior layer . Interrupted 6-0 PDS sutures were used to approximate the pancreatic duct to a small jejunotomy. The previously placed transpancreatic sutures were used to hold the jejunum over the pancreas, completing the anterior layer. The previously preserved falciform ligament was brought under and around the pancreatic anastomosis and suture into place to created flap coverage.     About 10 cm distal to the pancreatico-jejunostomy, an end to side hepaticojejunostomy was performed with continuous 5-0 PDS sutures.  There was no visible bile leakage.      An antecolic side to side gastrojejunostomy was performed between the posterior wall of the stomach, well away from the distal gastric staple line using a 60 mm purple loaded Endo GYPSY stapler. The anastomosis was examined from within. All limbs were patent. The staple line was secure and hemostatic. The enteroenterostomy was closed in 2 layers using 3-0 Vicryl and Silk suture.     This freed the specimen, which was oriented and inked on the back table.  Sections were obtained of the common hepatic duct and pancreatic margins. These would both return as nonmalignant.     A 15 [place] round Leonard drain was brought in via a right upper quadrant stab incision, placed behind hepaticojejunostomy and near but not on the pancreaticojejunostomy.     The fascia was closed using 0 non-looped PDS in deep bite, shallow advance fashion.  The wound was irrigated and skin closed with Monocryl and dermabond.      All needle, lap, and sponge counts were reported as correct. I communicated the intraoperative findings with the family following the procedure.     Implants: * No implants in log *    Specimens:   Specimen (12h ago, onward)     Start     Ordered    10/25/19 2099  Specimen to  Pathology - Surgery  Once     Comments:  1. Hepatic artery lymph node-permanent2. Gallbladder- permanent3. Whipple - Frozen-SENT4. Omentum- permanent      10/25/19 2839                        Condition: Good    Disposition: PACU - hemodynamically stable.    Attestation: I was present and scrubbed for the entire procedure.

## 2019-10-25 NOTE — PLAN OF CARE
[facility]       HOME  HEALTH ORDERS                                    FACE TO FACE ENCOUNTER      Patient Name: Jose Rafael Campbell  YOB: 1960    PCP: Primary Doctor No   PCP Address: None  PCP Phone Number: None  PCP Fax: None    Encounter Date: 10/25/2019    Admit to Home Health    Diagnoses:  Active Hospital Problems    Diagnosis  POA    *Pancreas cyst [K86.2]  Yes      Resolved Hospital Problems   No resolved problems to display.       No future appointments.        I have seen and examined this patient face to face today. My clinical findings that support the need for the home health skilled services and home bound status are the following:  Weakness/numbness causing balance and gait disturbance due to Surgery making it taxing to leave home.    Allergies:  Review of patient's allergies indicates:   Allergen Reactions    Codeine Other (See Comments)     headache    Aspirin Rash    Penicillins Rash       Diet: Regular diet    Activities: activity as tolerated    Nursing:   SN to complete comprehensive assessment including routine vital signs. Instruct on disease process and s/s of complications to report to MD. Review/verify medication list sent home with the patient at time of discharge  and instruct patient/caregiver as needed. Frequency may be adjusted depending on start of care date.    Notify MD if SBP > 160 or < 90; DBP > 90 or < 50; HR > 120 or < 50; Temp > 101      CONSULTS:    Physical Therapy to evaluate and treat. Evaluate for home safety and equipment needs; Establish/upgrade home exercise program. Perform / instruct on therapeutic exercises, gait training, transfer training, and Range of Motion.  Occupational Therapy to evaluate and treat. Evaluate home environment for safety and equipment needs. Perform/Instruct on transfers, ADL training, ROM, and therapeutic exercises.   to evaluate for community resources/long-range  planning.  Aide to provide assistance with personal care, ADLs, and vital signs.    Medications: Review discharge medications with patient and family and provide education.      Current Discharge Medication List      CONTINUE these medications which have NOT CHANGED    Details   acetaminophen (TYLENOL) 325 MG tablet Take 650 mg by mouth.      indomethacin (INDOCIN) 25 MG capsule       omeprazole (PRILOSEC) 20 MG capsule Take 20 mg by mouth.      simvastatin (ZOCOR) 40 MG tablet Take 40 mg by mouth.      sucralfate (CARAFATE) 100 mg/mL suspension Take 1 g by mouth.      vit B comp-C-FA-iron-vit E (STRESS FORMULA WITH IRON) 500 mg-400 mcg- 18 mg iron Tab Take 1 tablet by mouth.      vitamin D (VITAMIN D3) 1000 units Tab Take 1,000 Units by mouth.             Disciplines requested: Nursing Services to provide:   Other: S/P [place]       Labs: CBC, CMP, Mag, Phos Q Monday x 2 weeks; pre-albumin x 1 on Monday CBC, BMP Q Thursday x 2 weeks.  If it is a holiday or the patient is not available for labs then they can be drawn the next day.     Monitor abdomen for redness, oozing from staple site, abdominal distension, or pain not controlled by pain medication.     Vitals signs daily. Call MD with Temperature > 101, SBP > 180, HR < 50.   Physician to follow patient's care (the person listed here will be responsible for signing ongoing orders): Other: Dr. Jarod King, Dr. HEIDI Naranjo, Dr. Paul King [phone] office [phone] fax Requested Start of Care Date: 10/25/2019      I certify that this patient is confined to his home and needs intermittent skilled nursing care, physical therapy and occupational therapy.          Electronically Signed  _________________________________  Kaylee Pillai NP  10/25/2019

## 2019-10-25 NOTE — ANESTHESIA POSTPROCEDURE EVALUATION
Anesthesia Post Evaluation    Patient: Jose Rafael Campbell    Procedure(s) Performed: Procedure(s) (LRB):  WHIPPLE PROCEDURE (N/A)    Final Anesthesia Type: general  Patient location during evaluation: PACU  Patient participation: Yes- Able to Participate  Level of consciousness: awake and alert  Post-procedure vital signs: reviewed and stable  Pain management: adequate  Airway patency: patent  PONV status at discharge: No PONV  Anesthetic complications: no      Cardiovascular status: blood pressure returned to baseline and hemodynamically stable  Respiratory status: unassisted and spontaneous ventilation  Hydration status: euvolemic  Follow-up not needed.          Vitals Value Taken Time   /67 10/25/2019  4:18 PM   Temp 36.3 °C (97.4 °F) 10/25/2019  3:00 PM   Pulse 105 10/25/2019  4:24 PM   Resp 15 10/25/2019  4:24 PM   SpO2 97 % 10/25/2019  4:24 PM   Vitals shown include unvalidated device data.      No case tracking events are documented in the log.      Pain/Audi Score: Pain Rating Prior to Med Admin: 0 (10/25/2019  3:31 PM)  Audi Score: 7 (10/25/2019  4:00 PM)

## 2019-10-25 NOTE — BRIEF OP NOTE
[facility]  Brief Operative Note    SUMMARY     Surgery Date: 10/25/2019     Surgeon(s) and Role:     * Paul King MD - Primary     * Jono Martinez MD - Resident - Assisting     * Humphrey Ramos MD - Resident - Assisting        Pre-op Diagnosis:  Pancreas cyst [K86.2]    Post-op Diagnosis:  Post-Op Diagnosis Codes:     * Pancreas cyst [K86.2]    Procedure(s) (LRB):  WHIPPLE PROCEDURE (N/A)    Anesthesia: General    Description of Procedure: whipple procedure    Description of the findings of the procedure: multiple omental adhesions; large dilated pancreatic duct; cyst in the head of the pancreas    Estimated Blood Loss: 400 mL         Specimens:   Specimen (12h ago, onward)     Start     Ordered    10/25/19 1336  Specimen to Pathology - Surgery  Once     Comments:  1. Hepatic artery lymph node-permanent2. Gallbladder- permanent3. Whipple - Frozen-SENT4. Omentum- permanent      10/25/19 1809

## 2019-10-25 NOTE — TRANSFER OF CARE
"Anesthesia Transfer of Care Note    Patient: Jose Rafael Campbell    Procedure(s) Performed: Procedure(s) (LRB):  WHIPPLE PROCEDURE (N/A)    Patient location: PACU    Anesthesia Type: general    Transport from OR: Transported from OR on 6-10 L/min O2 by face mask with adequate spontaneous ventilation    Post pain: adequate analgesia    Post assessment: no apparent anesthetic complications    Post vital signs: stable    Level of consciousness: awake    Nausea/Vomiting: no nausea/vomiting    Complications: none    Transfer of care protocol was followed      Last vitals:   Visit Vitals  /74 (BP Location: Right arm, Patient Position: Lying)   Pulse 74   Temp 36.7 °C (98.1 °F) (Oral)   Resp 18   Ht 5' 5" (1.651 m)   Wt 65.3 kg (144 lb)   SpO2 98%   BMI 23.96 kg/m²     "

## 2019-10-26 LAB
ALBUMIN SERPL BCP-MCNC: 2.8 G/DL (ref 3.5–5.2)
ALP SERPL-CCNC: 46 U/L (ref 55–135)
ALT SERPL W/O P-5'-P-CCNC: 51 U/L (ref 10–44)
ANION GAP SERPL CALC-SCNC: 5 MMOL/L (ref 8–16)
AST SERPL-CCNC: 43 U/L (ref 10–40)
BASOPHILS # BLD AUTO: 0.02 K/UL (ref 0–0.2)
BASOPHILS NFR BLD: 0.1 % (ref 0–1.9)
BILIRUB SERPL-MCNC: 0.4 MG/DL (ref 0.1–1)
BUN SERPL-MCNC: 11 MG/DL (ref 8–23)
CALCIUM SERPL-MCNC: 7.9 MG/DL (ref 8.7–10.5)
CHLORIDE SERPL-SCNC: 107 MMOL/L (ref 95–110)
CO2 SERPL-SCNC: 27 MMOL/L (ref 23–29)
CREAT SERPL-MCNC: 0.8 MG/DL (ref 0.5–1.4)
DIFFERENTIAL METHOD: ABNORMAL
EOSINOPHIL # BLD AUTO: 0 K/UL (ref 0–0.5)
EOSINOPHIL NFR BLD: 0 % (ref 0–8)
ERYTHROCYTE [DISTWIDTH] IN BLOOD BY AUTOMATED COUNT: 13.4 % (ref 11.5–14.5)
EST. GFR  (AFRICAN AMERICAN): >60 ML/MIN/1.73 M^2
EST. GFR  (NON AFRICAN AMERICAN): >60 ML/MIN/1.73 M^2
GLUCOSE SERPL-MCNC: 132 MG/DL (ref 70–110)
HCT VFR BLD AUTO: 38.6 % (ref 40–54)
HGB BLD-MCNC: 12.2 G/DL (ref 14–18)
IMM GRANULOCYTES # BLD AUTO: 0.06 K/UL (ref 0–0.04)
IMM GRANULOCYTES NFR BLD AUTO: 0.4 % (ref 0–0.5)
LYMPHOCYTES # BLD AUTO: 0.9 K/UL (ref 1–4.8)
LYMPHOCYTES NFR BLD: 6.1 % (ref 18–48)
MAGNESIUM SERPL-MCNC: 2.1 MG/DL (ref 1.6–2.6)
MCH RBC QN AUTO: 30.7 PG (ref 27–31)
MCHC RBC AUTO-ENTMCNC: 31.6 G/DL (ref 32–36)
MCV RBC AUTO: 97 FL (ref 82–98)
MONOCYTES # BLD AUTO: 1.8 K/UL (ref 0.3–1)
MONOCYTES NFR BLD: 12 % (ref 4–15)
NEUTROPHILS # BLD AUTO: 12.1 K/UL (ref 1.8–7.7)
NEUTROPHILS NFR BLD: 81.4 % (ref 38–73)
NRBC BLD-RTO: 0 /100 WBC
PHOSPHATE SERPL-MCNC: 3.4 MG/DL (ref 2.7–4.5)
PLATELET # BLD AUTO: 270 K/UL (ref 150–350)
PMV BLD AUTO: 9.6 FL (ref 9.2–12.9)
POTASSIUM SERPL-SCNC: 5 MMOL/L (ref 3.5–5.1)
PROT SERPL-MCNC: 5.3 G/DL (ref 6–8.4)
RBC # BLD AUTO: 3.98 M/UL (ref 4.6–6.2)
SODIUM SERPL-SCNC: 139 MMOL/L (ref 136–145)
WBC # BLD AUTO: 14.9 K/UL (ref 3.9–12.7)

## 2019-10-26 PROCEDURE — 94770 HC EXHALED C02 TEST: CPT

## 2019-10-26 PROCEDURE — 25000242 PHARM REV CODE 250 ALT 637 W/ HCPCS: Performed by: STUDENT IN AN ORGANIZED HEALTH CARE EDUCATION/TRAINING PROGRAM

## 2019-10-26 PROCEDURE — 85025 COMPLETE CBC W/AUTO DIFF WBC: CPT

## 2019-10-26 PROCEDURE — 36415 COLL VENOUS BLD VENIPUNCTURE: CPT

## 2019-10-26 PROCEDURE — 25000003 PHARM REV CODE 250: Performed by: STUDENT IN AN ORGANIZED HEALTH CARE EDUCATION/TRAINING PROGRAM

## 2019-10-26 PROCEDURE — 97116 GAIT TRAINING THERAPY: CPT

## 2019-10-26 PROCEDURE — 27000221 HC OXYGEN, UP TO 24 HOURS

## 2019-10-26 PROCEDURE — 27000646 HC AEROBIKA DEVICE

## 2019-10-26 PROCEDURE — 94640 AIRWAY INHALATION TREATMENT: CPT

## 2019-10-26 PROCEDURE — 80053 COMPREHEN METABOLIC PANEL: CPT

## 2019-10-26 PROCEDURE — 84100 ASSAY OF PHOSPHORUS: CPT

## 2019-10-26 PROCEDURE — 94664 DEMO&/EVAL PT USE INHALER: CPT

## 2019-10-26 PROCEDURE — 99900035 HC TECH TIME PER 15 MIN (STAT)

## 2019-10-26 PROCEDURE — 63600175 PHARM REV CODE 636 W HCPCS: Performed by: STUDENT IN AN ORGANIZED HEALTH CARE EDUCATION/TRAINING PROGRAM

## 2019-10-26 PROCEDURE — 94761 N-INVAS EAR/PLS OXIMETRY MLT: CPT

## 2019-10-26 PROCEDURE — 83735 ASSAY OF MAGNESIUM: CPT

## 2019-10-26 PROCEDURE — 20600001 HC STEP DOWN PRIVATE ROOM

## 2019-10-26 PROCEDURE — 97161 PT EVAL LOW COMPLEX 20 MIN: CPT

## 2019-10-26 RX ORDER — KETOROLAC TROMETHAMINE 30 MG/ML
30 INJECTION, SOLUTION INTRAMUSCULAR; INTRAVENOUS EVERY 6 HOURS
Status: COMPLETED | OUTPATIENT
Start: 2019-10-26 | End: 2019-10-29

## 2019-10-26 RX ADMIN — LEVALBUTEROL HYDROCHLORIDE 0.63 MG: 0.63 SOLUTION RESPIRATORY (INHALATION) at 09:10

## 2019-10-26 RX ADMIN — KETOROLAC TROMETHAMINE 30 MG: 30 INJECTION, SOLUTION INTRAMUSCULAR; INTRAVENOUS at 05:10

## 2019-10-26 RX ADMIN — Medication: at 05:10

## 2019-10-26 RX ADMIN — KETOROLAC TROMETHAMINE 30 MG: 30 INJECTION, SOLUTION INTRAMUSCULAR; INTRAVENOUS at 12:10

## 2019-10-26 RX ADMIN — SODIUM CHLORIDE AND POTASSIUM CHLORIDE: .9; .15 SOLUTION INTRAVENOUS at 05:10

## 2019-10-26 RX ADMIN — LEVALBUTEROL HYDROCHLORIDE 0.63 MG: 0.63 SOLUTION RESPIRATORY (INHALATION) at 02:10

## 2019-10-26 RX ADMIN — KETOROLAC TROMETHAMINE 30 MG: 30 INJECTION, SOLUTION INTRAMUSCULAR; INTRAVENOUS at 11:10

## 2019-10-26 RX ADMIN — SODIUM CHLORIDE AND POTASSIUM CHLORIDE: .9; .15 SOLUTION INTRAVENOUS at 12:10

## 2019-10-26 RX ADMIN — LEVALBUTEROL HYDROCHLORIDE 0.63 MG: 0.63 SOLUTION RESPIRATORY (INHALATION) at 08:10

## 2019-10-26 RX ADMIN — MUPIROCIN 1 G: 20 OINTMENT TOPICAL at 09:10

## 2019-10-26 RX ADMIN — ONDANSETRON 8 MG: 2 INJECTION INTRAMUSCULAR; INTRAVENOUS at 09:10

## 2019-10-26 RX ADMIN — Medication: at 01:10

## 2019-10-26 RX ADMIN — MUPIROCIN 1 G: 20 OINTMENT TOPICAL at 11:10

## 2019-10-26 RX ADMIN — SODIUM CHLORIDE AND POTASSIUM CHLORIDE: .9; .15 SOLUTION INTRAVENOUS at 09:10

## 2019-10-26 RX ADMIN — MUPIROCIN 1 G: 20 OINTMENT TOPICAL at 12:10

## 2019-10-26 NOTE — PLAN OF CARE
Problem: Physical Therapy Goal  Goal: Physical Therapy Goal  Description  Goals to be met by: 19    Patient will increase functional independence with mobility by performin. Sit to stand transfer with Stand-by Assistance  2. Gait  x 250 feet with Stand-by Assistance using LRAD.   3. Ascend/Descend 6 inch curb step with Stand-by Assistance.     Outcome: Ongoing, Progressing   Evaluation completed and goals appropriate. Velvet Paz 10/26/2019

## 2019-10-26 NOTE — SUBJECTIVE & OBJECTIVE
Interval History:   No acute events overnight. NPO, VSS. No nausea or vomit. Adequate pain control with PCA.     Medications:  Continuous Infusions:   0/9% NACL & POTASSIUM CHLORIDE 20 MEQ/L 125 mL/hr at 10/26/19 0058    hydromorphone in 0.9 % NaCl 6 mg/30 ml       Scheduled Meds:   levalbuterol  0.63 mg Nebulization Q6H WAKE    mupirocin  1 g Nasal BID     PRN Meds:diphenhydrAMINE, influenza, naloxone, ondansetron, sodium chloride 0.9%     Review of patient's allergies indicates:   Allergen Reactions    Codeine Other (See Comments)     headache    Aspirin Rash    Penicillins Rash     Objective:     Vital Signs (Most Recent):  Temp: 97.9 °F (36.6 °C) (10/26/19 0747)  Pulse: 105 (10/26/19 0747)  Resp: 16 (10/26/19 0747)  BP: 120/76 (10/26/19 0747)  SpO2: 96 % (10/26/19 0747) Vital Signs (24h Range):  Temp:  [97.4 °F (36.3 °C)-98.7 °F (37.1 °C)] 97.9 °F (36.6 °C)  Pulse:  [] 105  Resp:  [14-29] 16  SpO2:  [90 %-100 %] 96 %  BP: (100-128)/(63-76) 120/76  Arterial Line BP: (113-121)/(55-61) 119/61     Weight: 65.3 kg (143 lb 15.4 oz)  Body mass index is 23.96 kg/m².    Intake/Output - Last 3 Shifts       10/24 0700 - 10/25 0659 10/25 0700 - 10/26 0659 10/26 0700 - 10/27 0659    I.V. (mL/kg)  6207 (95.1)     NG/GT  50     Total Intake(mL/kg)  6257 (95.8)     Urine (mL/kg/hr)  1810 (1.2)     Drains  180     Blood  400     Total Output  2390     Net  +3867                  Physical Exam   Constitutional: He appears well-developed and well-nourished.   HENT:   Head: Normocephalic.   Eyes: Pupils are equal, round, and reactive to light. Conjunctivae and EOM are normal.   Neck: Normal range of motion. Neck supple.   Cardiovascular: Normal rate, regular rhythm and normal heart sounds.   Pulmonary/Chest: Effort normal and breath sounds normal.   Abdominal: Soft.   Incions c/d/i covered with dermabond     Musculoskeletal: Normal range of motion.   Neurological: He is alert.   Skin: Skin is warm. Capillary refill  takes less than 2 seconds.       Significant Labs:  CBC:   Recent Labs   Lab 10/26/19  0442   WBC 14.90*   RBC 3.98*   HGB 12.2*   HCT 38.6*      MCV 97   MCH 30.7   MCHC 31.6*     BMP:   Recent Labs   Lab 10/26/19  0442   *      K 5.0      CO2 27   BUN 11   CREATININE 0.8   CALCIUM 7.9*   MG 2.1       Significant Diagnostics:  I have reviewed and interpreted all pertinent imaging results/findings within the past 24 hours.

## 2019-10-26 NOTE — PT/OT/SLP EVAL
Physical Therapy Evaluation    Patient Name:  Jose Rafael Campbell   MRN:  [id number]    Recommendations:     Discharge Recommendations:  home   Discharge Equipment Recommendations: none   Barriers to discharge: None    Assessment:     Jose Rafael Campbell is a 59 y.o. male admitted with a medical diagnosis of Pancreas cyst.  He presents with the following impairments/functional limitations:  weakness, impaired endurance, impaired functional mobilty, gait instability, impaired balance. Pt nate treatment well being able to ambulate ~58ft with RW and CGA. Pt became nauseated upon sitting but sensation passed quickly thereafter. Pt would benefit from skilled PT services to address impairments and improve physical function. Pt is s/p whipple (10/25).    Rehab Prognosis: Good; patient would benefit from acute skilled PT services to address these deficits and reach maximum level of function.    Recent Surgery: Procedure(s) (LRB):  WHIPPLE PROCEDURE (N/A) 1 Day Post-Op    Plan:     During this hospitalization, patient to be seen 4 x/week to address the identified rehab impairments via gait training, therapeutic activities, therapeutic exercises and progress toward the following goals:    · Plan of Care Expires:  11/25/19    Subjective     Chief Complaint: pt c/o pain  Patient/Family Comments/goals: to get better and go home  Pain/Comfort:  · Pain Rating 1: 0/10    Patients cultural, spiritual, Gnosticism conflicts given the current situation: no    Living Environment:  Pt lives with wife and grandson (27yo) in [facility] with threshold [place]. 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 Wife works full time as phlebotomist and will be taking time off to provide care.  Prior to admission, patients level of function was independent.  Equipment used at home: none.  DME owned (not currently used): none.  Upon discharge, patient will have assistance from wife and grandson.    Objective:     Communicated with nurse prior to session.  Patient found supine with  blood pressure cuff, telemetry, pulse ox (continuous), PCA, oxygen, peripheral IV, SCD, lomeli catheter, BJ drain, NG tube(CO2 monitor)  upon PT entry to room.    General Precautions: Standard, fall   Orthopedic Precautions:    Braces:       Exams:  · RLE ROM: WFL  · RLE Strength: WFL  · LLE ROM: WFL  · LLE Strength: WFL    Functional Mobility:  · Bed Mobility:   Pt required VCs for sequencing and safety  · Supine to Sit: supervision. Pt displayed nausea w/o vomiting upon sitting  ·   · Transfers:   Pt required VCs for sequencing and safety  · Sit to Stand:  contact guard assistance with rolling walker. Pt c/o dizziness but demo'd no LOB  ·   · Gait: pt nate ~58ft of gait training with RW, O2, and CGA. Pt c/o dizziness but demo'd no LOB  ·   · Stairs: not attempted d/t pt fatigue      Therapeutic Activities and Exercises:   Pt and wife educated on role of PT, AD use and safety, and importance of OOB activity following sx. Wife and pt verbalized comprehension.    AM-PAC 6 CLICK MOBILITY  Total Score:16     Patient left up in chair with all lines intact, call button in reach and wife present.    GOALS:   Multidisciplinary Problems     Physical Therapy Goals        Problem: Physical Therapy Goal    Goal Priority Disciplines Outcome Goal Variances Interventions   Physical Therapy Goal     PT, PT/OT      Description:  Goals to be met by: 19    Patient will increase functional independence with mobility by performin. Sit to stand transfer with Stand-by Assistance  2. Gait  x 250 feet with Stand-by Assistance using LRAD.   3. Ascend/Descend 6 inch curb step with Stand-by Assistance.                      History:     Past Medical History:   Diagnosis Date    Abdominal pain     Diarrhea     IPMN (intraductal papillary mucinous neoplasm)     Nausea     Pancreas cyst     Pancreatic duct dilated     Pancreatitis        Past Surgical History:   Procedure Laterality Date    APPENDECTOMY      ENDOSCOPIC  ULTRASOUND OF UPPER GASTROINTESTINAL TRACT N/A 10/3/2019    Procedure: ULTRASOUND, UPPER GI TRACT, ENDOSCOPIC;  Surgeon: Harsh Clark MD;  Location: [facility] ([address] [place] [address]);  Service: Endoscopy;  Laterality: N/A;    INGUINAL HERNIA REPAIR      NISSEN FUNDOPLICATION         Time Tracking:     PT Received On: 10/26/19  PT Start Time: 0741     PT Stop Time: 0811  PT Total Time (min): 30 min     Billable Minutes: Evaluation 15 min and Gait Training 15 min      Velvet Paz, SPT  10/26/2019

## 2019-10-26 NOTE — PLAN OF CARE
Plan of Care reviewed w/ pt and family at bedside. HR 90s-100s on monitor. All other VSS on 1L NC. Midline w/ dermabond BINDU. RLQ BJ drain w/ moderate serosanguinous output. R NGT to LIWS w/ scant brown drainage. Chin in place draining CYU. NPO ex ice. Dilaudid PCA at 0.2/8/1.6 w/ effect. OOB today w/ standby assist.

## 2019-10-26 NOTE — CONSULTS
"  [facility]  Adult Nutrition  Consult Note    SUMMARY     Recommendations    1. If/when medically feasible, ADAT to GI soft/light.   2. If unable to advance diet, initiate enteral nutrition. Rec'd Isosource 1.5 @ 45 mL/hr to provide 1620 kcals, 73 g of protein, 825 mL fluid.  3. RD to monitor & follow-up.    Goals: Meet % EEN, EPN  Nutrition Goal Status: new  Communication of RD Recs: reviewed with RN    Reason for Assessment    Reason For Assessment: consult  Diagnosis: other (see comments)(Pancreas cyst)  Interdisciplinary Rounds: did not attend    General Information Comments: S/p Whipple, NGT present to suction. Pt currently NPO. Pt resting at time of visit, spoke with family member at bedside. Family member reports pt w/ excellent appetite & stable wt PTA. NFPE not indicated, pt appears thin, however doesnt meet malnutrition criteria 2/2 good appetite & stable wt PTA.  Nutrition Discharge Planning: Adequate nutrition    Nutrition/Diet History    Patient Reported Diet/Restrictions/Preferences: general  Spiritual, Cultural Beliefs, Taoism Practices, Values that Affect Care: no  Factors Affecting Nutritional Intake: NPO    Anthropometrics    Temp: 97.5 °F (36.4 °C)  Height Method: Measured  Height: 5' 5" (165.1 cm)  Height (inches): 65 in  Weight Method: Bed Scale  Weight: 65.3 kg (143 lb 15.4 oz)  Weight (lb): 143.96 lb  Ideal Body Weight (IBW), Male: 136 lb  % Ideal Body Weight, Male (lb): 105.85 lb  BMI (Calculated): 24  BMI Grade: 18.5-24.9 - normal    Lab/Procedures/Meds    Pertinent Labs Reviewed: reviewed  Pertinent Medications Reviewed: reviewed    Estimated/Assessed Needs    Weight Used For Calorie Calculations: 65.3 kg (143 lb 15.4 oz)     Energy Calorie Requirements (kcal): 1731 kcal/d  Energy Need Method: [place](1.25 PAL)     Protein Requirements: 65-79 g/d (1-1.2 g/kg)  Weight Used For Protein Calculations: 65.3 kg (143 lb 15.4 oz)     Estimated Fluid Requirement " Method: other (see comments)(Per MD or 1 mL/kcal)     Nutrition Prescription Ordered    Current Diet Order: NPO    Evaluation of Received Nutrient/Fluid Intake    Comments: LBM: not recorded    Nutrition Risk    Level of Risk/Frequency of Follow-up: (2x/week)     Assessment and Plan    Nutrition Problem  Inadequate energy intake    Related to (etiology):   Inability to consume sufficient energy    Signs and Symptoms (as evidenced by):   NPO    Interventions/Recommendations (treatment strategy):  Collaboration of nutrition care w/ other providers     Nutrition Diagnosis Status:   New     Monitor and Evaluation    Food and Nutrient Intake: energy intake, food and beverage intake, enteral nutrition intake  Food and Nutrient Adminstration: diet order, enteral and parenteral nutrition administration  Physical Activity and Function: nutrition-related ADLs and IADLs  Anthropometric Measurements: weight, weight change  Biochemical Data, Medical Tests and Procedures: inflammatory profile, lipid profile, glucose/endocrine profile, gastrointestinal profile, electrolyte and renal panel  Nutrition-Focused Physical Findings: overall appearance     Nutrition Follow-Up    RD Follow-up?: Yes

## 2019-10-26 NOTE — NURSING
Recv'd pt at this. No distress noted rate pain as 6 and pt comfortable. Pt oriented to room and call light. IS given and explained to patient.  SCD applied. Will continue to monitor.

## 2019-10-26 NOTE — ASSESSMENT & PLAN NOTE
59 y.o male POD 1 of Whipple procedure. Doing well.     - NPO  - Pathway   - PT/OT  - Encourage IS  - Strict NPO  - IVF 80ml/h  - OOBTC x 1   - Pulmonary toilet.

## 2019-10-26 NOTE — PROGRESS NOTES
[facility]  General Surgery  Progress Note    Subjective:     History of Present Illness:  No notes on file    Post-Op Info:  Procedure(s) (LRB):  WHIPPLE PROCEDURE (N/A)   1 Day Post-Op     Interval History:   No acute events overnight. NPO, VSS. No nausea or vomit. Adequate pain control with PCA.     Medications:  Continuous Infusions:   0/9% NACL & POTASSIUM CHLORIDE 20 MEQ/L 125 mL/hr at 10/26/19 0058    hydromorphone in 0.9 % NaCl 6 mg/30 ml       Scheduled Meds:   levalbuterol  0.63 mg Nebulization Q6H WAKE    mupirocin  1 g Nasal BID     PRN Meds:diphenhydrAMINE, influenza, naloxone, ondansetron, sodium chloride 0.9%     Review of patient's allergies indicates:   Allergen Reactions    Codeine Other (See Comments)     headache    Aspirin Rash    Penicillins Rash     Objective:     Vital Signs (Most Recent):  Temp: 97.9 °F (36.6 °C) (10/26/19 0747)  Pulse: 105 (10/26/19 0747)  Resp: 16 (10/26/19 0747)  BP: 120/76 (10/26/19 0747)  SpO2: 96 % (10/26/19 0747) Vital Signs (24h Range):  Temp:  [97.4 °F (36.3 °C)-98.7 °F (37.1 °C)] 97.9 °F (36.6 °C)  Pulse:  [] 105  Resp:  [14-29] 16  SpO2:  [90 %-100 %] 96 %  BP: (100-128)/(63-76) 120/76  Arterial Line BP: (113-121)/(55-61) 119/61     Weight: 65.3 kg (143 lb 15.4 oz)  Body mass index is 23.96 kg/m².    Intake/Output - Last 3 Shifts       10/24 0700 - 10/25 0659 10/25 0700 - 10/26 0659 10/26 0700 - 10/27 0659    I.V. (mL/kg)  6207 (95.1)     NG/GT  50     Total Intake(mL/kg)  6257 (95.8)     Urine (mL/kg/hr)  1810 (1.2)     Drains  180     Blood  400     Total Output  2390     Net  +3867                  Physical Exam   Constitutional: He appears well-developed and well-nourished.   HENT:   Head: Normocephalic.   Eyes: Pupils are equal, round, and reactive to light. Conjunctivae and EOM are normal.   Neck: Normal range of motion. Neck supple.   Cardiovascular: Normal rate, regular rhythm and normal heart sounds.   Pulmonary/Chest:  Effort normal and breath sounds normal.   Abdominal: Soft.   Incions c/d/i covered with dermabond     Musculoskeletal: Normal range of motion.   Neurological: He is alert.   Skin: Skin is warm. Capillary refill takes less than 2 seconds.       Significant Labs:  CBC:   Recent Labs   Lab 10/26/19  0442   WBC 14.90*   RBC 3.98*   HGB 12.2*   HCT 38.6*      MCV 97   MCH 30.7   MCHC 31.6*     BMP:   Recent Labs   Lab 10/26/19  0442   *      K 5.0      CO2 27   BUN 11   CREATININE 0.8   CALCIUM 7.9*   MG 2.1       Significant Diagnostics:  I have reviewed and interpreted all pertinent imaging results/findings within the past 24 hours.    Assessment/Plan:     * Pancreas cyst  59 y.o male POD 1 of Whipple procedure. Doing well.     - NPO  - Pathway   - PT/OT  - Encourage IS  - Strict NPO  - IVF 80ml/h  - OOBTC x 1   - Pulmonary toilet.         Humphrey Ramos MD  General Surgery  [facility]

## 2019-10-27 LAB
ALBUMIN SERPL BCP-MCNC: 2.7 G/DL (ref 3.5–5.2)
ALP SERPL-CCNC: 53 U/L (ref 55–135)
ALT SERPL W/O P-5'-P-CCNC: 44 U/L (ref 10–44)
ANION GAP SERPL CALC-SCNC: 5 MMOL/L (ref 8–16)
AST SERPL-CCNC: 32 U/L (ref 10–40)
BASOPHILS # BLD AUTO: 0.02 K/UL (ref 0–0.2)
BASOPHILS NFR BLD: 0.1 % (ref 0–1.9)
BILIRUB SERPL-MCNC: 0.3 MG/DL (ref 0.1–1)
BUN SERPL-MCNC: 7 MG/DL (ref 8–23)
CALCIUM SERPL-MCNC: 8.4 MG/DL (ref 8.7–10.5)
CHLORIDE SERPL-SCNC: 103 MMOL/L (ref 95–110)
CO2 SERPL-SCNC: 29 MMOL/L (ref 23–29)
CREAT SERPL-MCNC: 0.7 MG/DL (ref 0.5–1.4)
DIFFERENTIAL METHOD: ABNORMAL
EOSINOPHIL # BLD AUTO: 0.1 K/UL (ref 0–0.5)
EOSINOPHIL NFR BLD: 0.3 % (ref 0–8)
ERYTHROCYTE [DISTWIDTH] IN BLOOD BY AUTOMATED COUNT: 13.7 % (ref 11.5–14.5)
EST. GFR  (AFRICAN AMERICAN): >60 ML/MIN/1.73 M^2
EST. GFR  (NON AFRICAN AMERICAN): >60 ML/MIN/1.73 M^2
GLUCOSE SERPL-MCNC: 111 MG/DL (ref 70–110)
HCT VFR BLD AUTO: 38.2 % (ref 40–54)
HGB BLD-MCNC: 12.2 G/DL (ref 14–18)
IMM GRANULOCYTES # BLD AUTO: 0.07 K/UL (ref 0–0.04)
IMM GRANULOCYTES NFR BLD AUTO: 0.4 % (ref 0–0.5)
LYMPHOCYTES # BLD AUTO: 0.5 K/UL (ref 1–4.8)
LYMPHOCYTES NFR BLD: 3.4 % (ref 18–48)
MAGNESIUM SERPL-MCNC: 2.1 MG/DL (ref 1.6–2.6)
MCH RBC QN AUTO: 30.5 PG (ref 27–31)
MCHC RBC AUTO-ENTMCNC: 31.9 G/DL (ref 32–36)
MCV RBC AUTO: 96 FL (ref 82–98)
MONOCYTES # BLD AUTO: 1.4 K/UL (ref 0.3–1)
MONOCYTES NFR BLD: 8.8 % (ref 4–15)
NEUTROPHILS # BLD AUTO: 13.8 K/UL (ref 1.8–7.7)
NEUTROPHILS NFR BLD: 87 % (ref 38–73)
NRBC BLD-RTO: 0 /100 WBC
PLATELET # BLD AUTO: 246 K/UL (ref 150–350)
PMV BLD AUTO: 9.7 FL (ref 9.2–12.9)
POTASSIUM SERPL-SCNC: 4.7 MMOL/L (ref 3.5–5.1)
PROT SERPL-MCNC: 5.7 G/DL (ref 6–8.4)
RBC # BLD AUTO: 4 M/UL (ref 4.6–6.2)
SODIUM SERPL-SCNC: 137 MMOL/L (ref 136–145)
WBC # BLD AUTO: 15.85 K/UL (ref 3.9–12.7)

## 2019-10-27 PROCEDURE — 25000003 PHARM REV CODE 250: Performed by: STUDENT IN AN ORGANIZED HEALTH CARE EDUCATION/TRAINING PROGRAM

## 2019-10-27 PROCEDURE — 25000242 PHARM REV CODE 250 ALT 637 W/ HCPCS: Performed by: STUDENT IN AN ORGANIZED HEALTH CARE EDUCATION/TRAINING PROGRAM

## 2019-10-27 PROCEDURE — 83735 ASSAY OF MAGNESIUM: CPT

## 2019-10-27 PROCEDURE — S0028 INJECTION, FAMOTIDINE, 20 MG: HCPCS | Performed by: STUDENT IN AN ORGANIZED HEALTH CARE EDUCATION/TRAINING PROGRAM

## 2019-10-27 PROCEDURE — 94761 N-INVAS EAR/PLS OXIMETRY MLT: CPT

## 2019-10-27 PROCEDURE — 94640 AIRWAY INHALATION TREATMENT: CPT

## 2019-10-27 PROCEDURE — 36415 COLL VENOUS BLD VENIPUNCTURE: CPT

## 2019-10-27 PROCEDURE — 20600001 HC STEP DOWN PRIVATE ROOM

## 2019-10-27 PROCEDURE — 80053 COMPREHEN METABOLIC PANEL: CPT

## 2019-10-27 PROCEDURE — 85025 COMPLETE CBC W/AUTO DIFF WBC: CPT

## 2019-10-27 PROCEDURE — 63600175 PHARM REV CODE 636 W HCPCS: Performed by: STUDENT IN AN ORGANIZED HEALTH CARE EDUCATION/TRAINING PROGRAM

## 2019-10-27 PROCEDURE — 94770 HC EXHALED C02 TEST: CPT

## 2019-10-27 PROCEDURE — 94664 DEMO&/EVAL PT USE INHALER: CPT

## 2019-10-27 PROCEDURE — 94799 UNLISTED PULMONARY SVC/PX: CPT

## 2019-10-27 PROCEDURE — 27000221 HC OXYGEN, UP TO 24 HOURS

## 2019-10-27 PROCEDURE — 99900035 HC TECH TIME PER 15 MIN (STAT)

## 2019-10-27 RX ORDER — FAMOTIDINE 10 MG/ML
20 INJECTION INTRAVENOUS 2 TIMES DAILY
Status: DISCONTINUED | OUTPATIENT
Start: 2019-10-27 | End: 2019-10-30

## 2019-10-27 RX ADMIN — Medication: at 05:10

## 2019-10-27 RX ADMIN — SODIUM CHLORIDE AND POTASSIUM CHLORIDE: .9; .15 SOLUTION INTRAVENOUS at 11:10

## 2019-10-27 RX ADMIN — ONDANSETRON 8 MG: 2 INJECTION INTRAMUSCULAR; INTRAVENOUS at 11:10

## 2019-10-27 RX ADMIN — MUPIROCIN 1 G: 20 OINTMENT TOPICAL at 08:10

## 2019-10-27 RX ADMIN — KETOROLAC TROMETHAMINE 30 MG: 30 INJECTION, SOLUTION INTRAMUSCULAR; INTRAVENOUS at 11:10

## 2019-10-27 RX ADMIN — FAMOTIDINE 20 MG: 10 INJECTION, SOLUTION INTRAVENOUS at 11:10

## 2019-10-27 RX ADMIN — SODIUM CHLORIDE 1000 ML: 0.9 INJECTION, SOLUTION INTRAVENOUS at 11:10

## 2019-10-27 RX ADMIN — LEVALBUTEROL HYDROCHLORIDE 0.63 MG: 0.63 SOLUTION RESPIRATORY (INHALATION) at 07:10

## 2019-10-27 RX ADMIN — FAMOTIDINE 20 MG: 10 INJECTION, SOLUTION INTRAVENOUS at 08:10

## 2019-10-27 RX ADMIN — LEVALBUTEROL HYDROCHLORIDE 0.63 MG: 0.63 SOLUTION RESPIRATORY (INHALATION) at 08:10

## 2019-10-27 RX ADMIN — LEVALBUTEROL HYDROCHLORIDE 0.63 MG: 0.63 SOLUTION RESPIRATORY (INHALATION) at 02:10

## 2019-10-27 RX ADMIN — MUPIROCIN 1 G: 20 OINTMENT TOPICAL at 09:10

## 2019-10-27 RX ADMIN — SODIUM CHLORIDE AND POTASSIUM CHLORIDE: .9; .15 SOLUTION INTRAVENOUS at 07:10

## 2019-10-27 RX ADMIN — KETOROLAC TROMETHAMINE 30 MG: 30 INJECTION, SOLUTION INTRAMUSCULAR; INTRAVENOUS at 06:10

## 2019-10-27 RX ADMIN — SODIUM CHLORIDE AND POTASSIUM CHLORIDE: .9; .15 SOLUTION INTRAVENOUS at 02:10

## 2019-10-27 RX ADMIN — KETOROLAC TROMETHAMINE 30 MG: 30 INJECTION, SOLUTION INTRAMUSCULAR; INTRAVENOUS at 05:10

## 2019-10-27 RX ADMIN — Medication: at 12:10

## 2019-10-27 NOTE — NURSING
Pt states experiencing hallucinations, 'I feel like I'm floating, like the hospital lifted in the air by people and there' party music'. Pt has pinpoint pupils, complains of itchiness, and is able to answer all orientation questions appropriately. Pt resting comfortably. Team notified, will continue to monitor.

## 2019-10-27 NOTE — NURSING
RN spto Dr Man regarding Pt's c/o of feeling full and bloated. RN expressed concern with NG tube suction.  ordered KUB to confirm placement.     RN called Xray at 1600 to confirm they received order.    Update:  Pt has not received Xray (time 1520). RN called Xray again. Xray dept reported they are on their way.    Update:  Xray performed at 17:40

## 2019-10-27 NOTE — SUBJECTIVE & OBJECTIVE
Interval History:   No acute events overnight. NPO, VSS. No nausea or vomit. Adequate pain control with PCA. Tachycardic to 120s. Still in 1 L NC.     Medications:  Continuous Infusions:   0/9% NACL & POTASSIUM CHLORIDE 20 MEQ/L 125 mL/hr at 10/27/19 0202    hydromorphone in 0.9 % NaCl 6 mg/30 ml       Scheduled Meds:   ketorolac  30 mg Intravenous Q6H    levalbuterol  0.63 mg Nebulization Q6H WAKE    mupirocin  1 g Nasal BID     PRN Meds:diphenhydrAMINE, influenza, naloxone, ondansetron, sodium chloride 0.9%     Review of patient's allergies indicates:   Allergen Reactions    Codeine Other (See Comments)     headache    Aspirin Rash    Penicillins Rash     Objective:     Vital Signs (Most Recent):  Temp: 98 °F (36.7 °C) (10/27/19 0422)  Pulse: (!) 111 (10/27/19 0723)  Resp: 18 (10/27/19 0723)  BP: 125/75 (10/26/19 2338)  SpO2: (!) 92 % (10/27/19 0723) Vital Signs (24h Range):  Temp:  [97.4 °F (36.3 °C)-98 °F (36.7 °C)] 98 °F (36.7 °C)  Pulse:  [101-120] 111  Resp:  [16-25] 18  SpO2:  [92 %-99 %] 92 %  BP: ()/(63-76) 125/75     Weight: 65.3 kg (143 lb 15.4 oz)  Body mass index is 23.96 kg/m².    Intake/Output - Last 3 Shifts       10/25 0700 - 10/26 0659 10/26 0700 - 10/27 0659 10/27 0700 - 10/28 0659    I.V. (mL/kg) 6207 (95.1) 4620.4 (70.8)     Total Intake(mL/kg) 6207 (95.1) 4620.4 (70.8)     Urine (mL/kg/hr) 1810 (1.2) 1600 (1)     Drains 230 865     Stool  0     Blood 400      Total Output 2440 2465     Net +3767 +2155.4            Stool Occurrence  0 x           Physical Exam   Constitutional: He appears well-developed and well-nourished.   HENT:   Head: Normocephalic.   Eyes: Pupils are equal, round, and reactive to light. Conjunctivae and EOM are normal.   Neck: Normal range of motion. Neck supple.   Cardiovascular: Normal rate, regular rhythm and normal heart sounds.   Pulmonary/Chest: Effort normal and breath sounds normal.   Abdominal: Soft.   Incions c/d/i covered with dermabond      Musculoskeletal: Normal range of motion.   Neurological: He is alert.   Skin: Skin is warm. Capillary refill takes less than 2 seconds.       Significant Labs:  CBC:   Recent Labs   Lab 10/27/19  0522   WBC 15.85*   RBC 4.00*   HGB 12.2*   HCT 38.2*      MCV 96   MCH 30.5   MCHC 31.9*     BMP:   Recent Labs   Lab 10/27/19  0522   *      K 4.7      CO2 29   BUN 7*   CREATININE 0.7   CALCIUM 8.4*   MG 2.1       Significant Diagnostics:  I have reviewed and interpreted all pertinent imaging results/findings within the past 24 hours.

## 2019-10-27 NOTE — ASSESSMENT & PLAN NOTE
59 y.o male POD 2 of Whipple procedure. Doing well.     - NPO  - Pathway   - PT/OT  - Encourage IS  - IVF 80ml/h  - OOBTC x 2  - Ambulate in pm   - Pulmonary toilet.   - D/C Giuseppe

## 2019-10-27 NOTE — PROGRESS NOTES
[facility]  General Surgery  Progress Note    Subjective:     History of Present Illness:  No notes on file    Post-Op Info:  Procedure(s) (LRB):  WHIPPLE PROCEDURE (N/A)   2 Days Post-Op     Interval History:   No acute events overnight. NPO, VSS. No nausea or vomit. Adequate pain control with PCA. Tachycardic to 120s. Still in 1 L NC.     Medications:  Continuous Infusions:   0/9% NACL & POTASSIUM CHLORIDE 20 MEQ/L 125 mL/hr at 10/27/19 0202    hydromorphone in 0.9 % NaCl 6 mg/30 ml       Scheduled Meds:   ketorolac  30 mg Intravenous Q6H    levalbuterol  0.63 mg Nebulization Q6H WAKE    mupirocin  1 g Nasal BID     PRN Meds:diphenhydrAMINE, influenza, naloxone, ondansetron, sodium chloride 0.9%     Review of patient's allergies indicates:   Allergen Reactions    Codeine Other (See Comments)     headache    Aspirin Rash    Penicillins Rash     Objective:     Vital Signs (Most Recent):  Temp: 98 °F (36.7 °C) (10/27/19 0422)  Pulse: (!) 111 (10/27/19 0723)  Resp: 18 (10/27/19 0723)  BP: 125/75 (10/26/19 2338)  SpO2: (!) 92 % (10/27/19 0723) Vital Signs (24h Range):  Temp:  [97.4 °F (36.3 °C)-98 °F (36.7 °C)] 98 °F (36.7 °C)  Pulse:  [101-120] 111  Resp:  [16-25] 18  SpO2:  [92 %-99 %] 92 %  BP: ()/(63-76) 125/75     Weight: 65.3 kg (143 lb 15.4 oz)  Body mass index is 23.96 kg/m².    Intake/Output - Last 3 Shifts       10/25 0700 - 10/26 0659 10/26 0700 - 10/27 0659 10/27 0700 - 10/28 0659    I.V. (mL/kg) 6207 (95.1) 4620.4 (70.8)     Total Intake(mL/kg) 6207 (95.1) 4620.4 (70.8)     Urine (mL/kg/hr) 1810 (1.2) 1600 (1)     Drains 230 865     Stool  0     Blood 400      Total Output 2440 2465     Net +3767 +2155.4            Stool Occurrence  0 x           Physical Exam   Constitutional: He appears well-developed and well-nourished.   HENT:   Head: Normocephalic.   Eyes: Pupils are equal, round, and reactive to light. Conjunctivae and EOM are normal.   Neck: Normal range of motion.  Neck supple.   Cardiovascular: Normal rate, regular rhythm and normal heart sounds.   Pulmonary/Chest: Effort normal and breath sounds normal.   Abdominal: Soft.   Incions c/d/i covered with dermabond     Musculoskeletal: Normal range of motion.   Neurological: He is alert.   Skin: Skin is warm. Capillary refill takes less than 2 seconds.       Significant Labs:  CBC:   Recent Labs   Lab 10/27/19  0522   WBC 15.85*   RBC 4.00*   HGB 12.2*   HCT 38.2*      MCV 96   MCH 30.5   MCHC 31.9*     BMP:   Recent Labs   Lab 10/27/19  0522   *      K 4.7      CO2 29   BUN 7*   CREATININE 0.7   CALCIUM 8.4*   MG 2.1       Significant Diagnostics:  I have reviewed and interpreted all pertinent imaging results/findings within the past 24 hours.    Assessment/Plan:     * Pancreas cyst  59 y.o male POD 2 of Whipple procedure. Doing well.     - NPO  - Pathway   - PT/OT  - Encourage IS  - IVF 80ml/h  - OOBTC x 2  - Ambulate in pm   - Pulmonary toilet.   - D/C Giuseppe Ramos MD  General Surgery  [facility]

## 2019-10-27 NOTE — PLAN OF CARE
Plan of care reviewed with pt and spouse, who verbalized understanding. Pt stated having hallucinations early in shift while remaining AAOx4, pt reports no further hallucinations through shift. Cihn removed this morning, Pt is voiding adequately via urinal. Pt w/ 450ml out from NGT, abdominal discomfort, passing gas. Pt utilizing PCA for pain. Call bell in reach, bed locked in lowest position. Frequent rounds made for pt safety. Tachycardic, otherwise VSS, will continue to monitor.

## 2019-10-27 NOTE — PLAN OF CARE
POC reviewed with patient and spouse. Verbal understanding received. Patient aox4 VSS, 2 L NC. ML incision with DB BINDU. Clean dry intact. BJ to RLQ leaking at site, SS drainage. Patient complaint of abdominal pain. Moderately controlled with Pain pump. NG to LIWS with 650 brown output. R NG Flushed, Patent. Patient 1 assist up to chair. Concentrated UOP with adequate amount via lomeli. No significant events overnight. Call light in reach. RN WCTM

## 2019-10-28 LAB
ALBUMIN SERPL BCP-MCNC: 2.5 G/DL (ref 3.5–5.2)
ALP SERPL-CCNC: 55 U/L (ref 55–135)
ALT SERPL W/O P-5'-P-CCNC: 34 U/L (ref 10–44)
ANION GAP SERPL CALC-SCNC: 6 MMOL/L (ref 8–16)
AST SERPL-CCNC: 25 U/L (ref 10–40)
BASOPHILS # BLD AUTO: 0.04 K/UL (ref 0–0.2)
BASOPHILS NFR BLD: 0.3 % (ref 0–1.9)
BILIRUB SERPL-MCNC: 0.3 MG/DL (ref 0.1–1)
BUN SERPL-MCNC: 4 MG/DL (ref 8–23)
CALCIUM SERPL-MCNC: 8.5 MG/DL (ref 8.7–10.5)
CHLORIDE SERPL-SCNC: 103 MMOL/L (ref 95–110)
CO2 SERPL-SCNC: 27 MMOL/L (ref 23–29)
CREAT SERPL-MCNC: 0.6 MG/DL (ref 0.5–1.4)
CRP SERPL-MCNC: 215.08 MG/L (ref 0–3.19)
DIFFERENTIAL METHOD: ABNORMAL
EOSINOPHIL # BLD AUTO: 0.1 K/UL (ref 0–0.5)
EOSINOPHIL NFR BLD: 0.8 % (ref 0–8)
ERYTHROCYTE [DISTWIDTH] IN BLOOD BY AUTOMATED COUNT: 13.2 % (ref 11.5–14.5)
EST. GFR  (AFRICAN AMERICAN): >60 ML/MIN/1.73 M^2
EST. GFR  (NON AFRICAN AMERICAN): >60 ML/MIN/1.73 M^2
GLUCOSE SERPL-MCNC: 90 MG/DL (ref 70–110)
HCT VFR BLD AUTO: 35.4 % (ref 40–54)
HGB BLD-MCNC: 11.1 G/DL (ref 14–18)
IMM GRANULOCYTES # BLD AUTO: 0.08 K/UL (ref 0–0.04)
IMM GRANULOCYTES NFR BLD AUTO: 0.6 % (ref 0–0.5)
LYMPHOCYTES # BLD AUTO: 0.6 K/UL (ref 1–4.8)
LYMPHOCYTES NFR BLD: 4.5 % (ref 18–48)
MAGNESIUM SERPL-MCNC: 1.9 MG/DL (ref 1.6–2.6)
MCH RBC QN AUTO: 30.4 PG (ref 27–31)
MCHC RBC AUTO-ENTMCNC: 31.4 G/DL (ref 32–36)
MCV RBC AUTO: 97 FL (ref 82–98)
MONOCYTES # BLD AUTO: 1.3 K/UL (ref 0.3–1)
MONOCYTES NFR BLD: 9.2 % (ref 4–15)
NEUTROPHILS # BLD AUTO: 11.7 K/UL (ref 1.8–7.7)
NEUTROPHILS NFR BLD: 84.6 % (ref 38–73)
NRBC BLD-RTO: 0 /100 WBC
PLATELET # BLD AUTO: 240 K/UL (ref 150–350)
PMV BLD AUTO: 9.8 FL (ref 9.2–12.9)
POTASSIUM SERPL-SCNC: 4.3 MMOL/L (ref 3.5–5.1)
PROT SERPL-MCNC: 5.8 G/DL (ref 6–8.4)
RBC # BLD AUTO: 3.65 M/UL (ref 4.6–6.2)
SODIUM SERPL-SCNC: 136 MMOL/L (ref 136–145)
WBC # BLD AUTO: 13.87 K/UL (ref 3.9–12.7)

## 2019-10-28 PROCEDURE — 36415 COLL VENOUS BLD VENIPUNCTURE: CPT

## 2019-10-28 PROCEDURE — 86141 C-REACTIVE PROTEIN HS: CPT

## 2019-10-28 PROCEDURE — 85025 COMPLETE CBC W/AUTO DIFF WBC: CPT

## 2019-10-28 PROCEDURE — 27000221 HC OXYGEN, UP TO 24 HOURS

## 2019-10-28 PROCEDURE — 99900035 HC TECH TIME PER 15 MIN (STAT)

## 2019-10-28 PROCEDURE — 25000003 PHARM REV CODE 250: Performed by: STUDENT IN AN ORGANIZED HEALTH CARE EDUCATION/TRAINING PROGRAM

## 2019-10-28 PROCEDURE — 80053 COMPREHEN METABOLIC PANEL: CPT

## 2019-10-28 PROCEDURE — 97165 OT EVAL LOW COMPLEX 30 MIN: CPT

## 2019-10-28 PROCEDURE — 93010 EKG 12-LEAD: ICD-10-PCS | Mod: ,,, | Performed by: INTERNAL MEDICINE

## 2019-10-28 PROCEDURE — 63600175 PHARM REV CODE 636 W HCPCS: Performed by: NURSE PRACTITIONER

## 2019-10-28 PROCEDURE — 25000242 PHARM REV CODE 250 ALT 637 W/ HCPCS: Performed by: STUDENT IN AN ORGANIZED HEALTH CARE EDUCATION/TRAINING PROGRAM

## 2019-10-28 PROCEDURE — 94770 HC EXHALED C02 TEST: CPT

## 2019-10-28 PROCEDURE — 94761 N-INVAS EAR/PLS OXIMETRY MLT: CPT

## 2019-10-28 PROCEDURE — 83735 ASSAY OF MAGNESIUM: CPT

## 2019-10-28 PROCEDURE — 97116 GAIT TRAINING THERAPY: CPT

## 2019-10-28 PROCEDURE — 20600001 HC STEP DOWN PRIVATE ROOM

## 2019-10-28 PROCEDURE — 94640 AIRWAY INHALATION TREATMENT: CPT

## 2019-10-28 PROCEDURE — S0028 INJECTION, FAMOTIDINE, 20 MG: HCPCS | Performed by: STUDENT IN AN ORGANIZED HEALTH CARE EDUCATION/TRAINING PROGRAM

## 2019-10-28 PROCEDURE — 63600175 PHARM REV CODE 636 W HCPCS: Performed by: STUDENT IN AN ORGANIZED HEALTH CARE EDUCATION/TRAINING PROGRAM

## 2019-10-28 PROCEDURE — 97535 SELF CARE MNGMENT TRAINING: CPT

## 2019-10-28 PROCEDURE — 93010 ELECTROCARDIOGRAM REPORT: CPT | Mod: ,,, | Performed by: INTERNAL MEDICINE

## 2019-10-28 PROCEDURE — 93005 ELECTROCARDIOGRAM TRACING: CPT

## 2019-10-28 RX ORDER — MAGNESIUM SULFATE HEPTAHYDRATE 40 MG/ML
2 INJECTION, SOLUTION INTRAVENOUS ONCE
Status: COMPLETED | OUTPATIENT
Start: 2019-10-28 | End: 2019-10-28

## 2019-10-28 RX ADMIN — ONDANSETRON 8 MG: 2 INJECTION INTRAMUSCULAR; INTRAVENOUS at 09:10

## 2019-10-28 RX ADMIN — KETOROLAC TROMETHAMINE 30 MG: 30 INJECTION, SOLUTION INTRAMUSCULAR; INTRAVENOUS at 11:10

## 2019-10-28 RX ADMIN — LEVALBUTEROL HYDROCHLORIDE 0.63 MG: 0.63 SOLUTION RESPIRATORY (INHALATION) at 08:10

## 2019-10-28 RX ADMIN — KETOROLAC TROMETHAMINE 30 MG: 30 INJECTION, SOLUTION INTRAMUSCULAR; INTRAVENOUS at 05:10

## 2019-10-28 RX ADMIN — MAGNESIUM SULFATE IN WATER 2 G: 40 INJECTION, SOLUTION INTRAVENOUS at 12:10

## 2019-10-28 RX ADMIN — FAMOTIDINE 20 MG: 10 INJECTION, SOLUTION INTRAVENOUS at 08:10

## 2019-10-28 RX ADMIN — LEVALBUTEROL HYDROCHLORIDE 0.63 MG: 0.63 SOLUTION RESPIRATORY (INHALATION) at 11:10

## 2019-10-28 RX ADMIN — KETOROLAC TROMETHAMINE 30 MG: 30 INJECTION, SOLUTION INTRAMUSCULAR; INTRAVENOUS at 06:10

## 2019-10-28 RX ADMIN — LEVALBUTEROL HYDROCHLORIDE 0.63 MG: 0.63 SOLUTION RESPIRATORY (INHALATION) at 07:10

## 2019-10-28 RX ADMIN — MUPIROCIN 1 G: 20 OINTMENT TOPICAL at 10:10

## 2019-10-28 RX ADMIN — SODIUM CHLORIDE AND POTASSIUM CHLORIDE: .9; .15 SOLUTION INTRAVENOUS at 02:10

## 2019-10-28 RX ADMIN — KETOROLAC TROMETHAMINE 30 MG: 30 INJECTION, SOLUTION INTRAMUSCULAR; INTRAVENOUS at 12:10

## 2019-10-28 RX ADMIN — SODIUM CHLORIDE AND POTASSIUM CHLORIDE: .9; .15 SOLUTION INTRAVENOUS at 10:10

## 2019-10-28 RX ADMIN — SODIUM CHLORIDE 1000 ML: 0.9 INJECTION, SOLUTION INTRAVENOUS at 10:10

## 2019-10-28 RX ADMIN — MUPIROCIN 1 G: 20 OINTMENT TOPICAL at 09:10

## 2019-10-28 RX ADMIN — FAMOTIDINE 20 MG: 10 INJECTION, SOLUTION INTRAVENOUS at 09:10

## 2019-10-28 NOTE — PLAN OF CARE
POC reviewed with patient and spouse. Verbal understanding received. Patient aox4 VSS, 4 L NC. ML incision with DB BINDU Clean dry intact. Abdomen soft. BJ to RLQ leaking at site, dark SS drainage. Patient complaint of chest pain, headache and abdominal pain. With increased VS upon standing for urinal. MD notified. Xray and ekg ordered. Patient returned to baseline. Pain Moderately controlled with Pain pump. Reports less pain and relief of HA after Toradol. NG to LIWS with 500 brown output. R NG Flushed often, Patent. Patient 1 assist up to chair. Concentrated UOP with adequate amount. Call light in reach. Wife at beside. RN ARMAND

## 2019-10-28 NOTE — CARE UPDATE
"RAPID RESPONSE NURSE PROACTIVE ROUNDING NOTE     Time of Visit: 08:32    Admit Date: 10/25/2019  LOS: 3  Code Status: Prior   Date of Visit: 10/28/2019  : 1960  Age: 59 y.o.  Sex: male  Race: White  Bed: [address] [place] [address] A:   MRN: [id number]  Was the patient discharged from an ICU this admission? no   Was the patient discharged from a PACU within last 24 hours?  no  Did the patient receive conscious sedation/general anesthesia in last 24 hours?  no  Was the patient in the ED within the past 24 hours?  no  Was the patient started on NIPPV within the past 24 hours?  no  Attending Physician: Paul King MD  Primary Service: Networked reference to record PCT     ASSESSMENT     Diagnosis: Pancreas cyst    Abnormal Vital Signs: /89 (BP Location: Left arm, Patient Position: Sitting)   Pulse (!) 124   Temp 98.5 °F (36.9 °C) (Temporal)   Resp 18   Ht 5' 5" (1.651 m)   Wt 65.3 kg (143 lb 15.4 oz)   SpO2 97%   BMI 23.96 kg/m²      Clinical Issues: Circulatory    Patient  has a past medical history of Abdominal pain, Diarrhea, IPMN (intraductal papillary mucinous neoplasm), Nausea, Pancreas cyst, Pancreatic duct dilated, and Pancreatitis.    Patient AAOX4 with family at bedside. HR 110s-120s but VS stable,  NGT to LIWS. Analgesia via PCA, pain controlled. PCA end tidal capnography monitor not functioning properly, RN notified.     INTERVENTIONS/ RECOMMENDATIONS     Change entire PCA system for effective end tidal capnography monitoring.     Discussed plan of care with RNKatjai.    PHYSICIAN ESCALATION     Yes/No  no    Orders received and case discussed with NA.    Disposition: Remain in room 1024B.    FOLLOW-UP     Call back the Rapid Response Nurse, Rusty Do RN at [phone] for additional questions or concerns.        "

## 2019-10-28 NOTE — ASSESSMENT & PLAN NOTE
59 y.o male PMH of IPMN in the uncinate that wass been followed appropriately for years, with recent increase in size as well as new PD dilation to 1.3cm. POD 3 of Whipple procedure.     - NPO  - Pathway   - PT/OT  - Encourage IS  - IVF 80ml/h  - OOBTC x 2  - Ambulate in pm   - Pulmonary toilet.   - D/C Chin   - TBD d/c of NG tube

## 2019-10-28 NOTE — PLAN OF CARE
Evaluation completed 10/28/2019  Halie Johnson OT    Problem: Occupational Therapy Goal  Goal: Occupational Therapy Goal  Description  Goals to be met by: 11/11/2019    Patient will increase functional independence with ADLs by performing:    UE Dressing with Stand-by Assistance.  LE Dressing with Stand-by Assistance.  Grooming while standing with [place].  Toileting from toilet with Stand-by Assistance for hygiene and clothing management.   Toilet transfer to toilet with Stand-by Assistance.     Outcome: Ongoing, Progressing

## 2019-10-28 NOTE — PROGRESS NOTES
Patient hr 145 after standing to use urinal. Complains of chest pain, headache and bloated abdomen. 153/93  (117) 140 pulse, 20 R o2 91%. VS returned to evening baseline upon resting. Dr. Man notified. EKG and chest xray ordered. RN will continue to monitor

## 2019-10-28 NOTE — PLAN OF CARE
Problem: Physical Therapy Goal  Goal: Physical Therapy Goal  Description  Goals to be met by: 19    Patient will increase functional independence with mobility by performin. Sit to stand transfer with Stand-by Assistance - met 10/28  2. Gait  x 250 feet with Stand-by Assistance using LRAD. - met 10/28  3. Ascend/Descend 6 inch curb step with Stand-by Assistance.- not met  4 pt receive gait training ~ 250 ft modified Independent using rolling IV pole for balance if needed. - not met       10/28/2019 1429 by Leisa Cruz, PT  Outcome: Ongoing, Progressing  10/28/2019 1426 by Leisa Cruz, PT  Outcome: Ongoing, Progressing   Goals updated for content and are appropriate. Leisa Cruz, PT  10/28/2019

## 2019-10-28 NOTE — NURSING
Pt reported that he is seeing smoke in the room. No smoke noted by staff and family. Paged on call to report findings to .   No new orders placed.

## 2019-10-28 NOTE — PROGRESS NOTES
[facility]  General Surgery  Progress Note    Subjective:     History of Present Illness:  No notes on file    Post-Op Info:  Procedure(s) (LRB):  WHIPPLE PROCEDURE (N/A)   3 Days Post-Op     Interval History:   No acute events overnight. NPO, VSS. No nausea or vomit. Adequate pain control with PCA. Tachycardic to 120s. Still in 2 L NC.   Ng tube output:   450cc 5011-6977  Not recorded [id number]    Chest pain overnight, EKG showed NSR.     Medications:  Continuous Infusions:   0/9% NACL & POTASSIUM CHLORIDE 20 MEQ/L 125 mL/hr at 10/28/19 0253    hydromorphone in 0.9 % NaCl 6 mg/30 ml       Scheduled Meds:   famotidine (PF)  20 mg Intravenous BID    ketorolac  30 mg Intravenous Q6H    levalbuterol  0.63 mg Nebulization Q6H WAKE    mupirocin  1 g Nasal BID    sodium chloride 0.9%  1,000 mL Intravenous Once     PRN Meds:diphenhydrAMINE, influenza, naloxone, ondansetron, sodium chloride 0.9%     Review of patient's allergies indicates:   Allergen Reactions    Codeine Other (See Comments)     headache    Aspirin Rash    Penicillins Rash     Objective:     Vital Signs (Most Recent):  Temp: 98.5 °F (36.9 °C) (10/28/19 0742)  Pulse: (!) 117 (10/28/19 0742)  Resp: (!) 22 (10/28/19 0742)  BP: 135/89 (10/28/19 0742)  SpO2: 98 % (10/28/19 0742) Vital Signs (24h Range):  Temp:  [98 °F (36.7 °C)-98.8 °F (37.1 °C)] 98.5 °F (36.9 °C)  Pulse:  [105-127] 117  Resp:  [16-22] 22  SpO2:  [95 %-99 %] 98 %  BP: (134-150)/(84-94) 135/89     Weight: 65.3 kg (143 lb 15.4 oz)  Body mass index is 23.96 kg/m².    Intake/Output - Last 3 Shifts       10/26 0700 - 10/27 0659 10/27 0700 - 10/28 0659 10/28 0700 - 10/29 0659    I.V. (mL/kg) 4620.4 (70.8) 2697.9 (41.3)     NG/GT  120     IV Piggyback  1000     Total Intake(mL/kg) 4620.4 (70.8) 3817.9 (58.5)     Urine (mL/kg/hr) 1600 (1) 2560 (1.6)     Drains 865 510     Stool 0 0     Blood       Total Output 2465 3070     Net +2155.4 +747.9            Stool Occurrence 0 x  0 x           Physical Exam   Constitutional: He appears well-developed and well-nourished.   HENT:   Head: Normocephalic.   Eyes: Pupils are equal, round, and reactive to light. Conjunctivae and EOM are normal.   Neck: Normal range of motion. Neck supple.   Cardiovascular: Normal rate and regular rhythm.   Pulmonary/Chest: Effort normal.   Abdominal: Soft.   Incions c/d/i covered with dermabond     Musculoskeletal: Normal range of motion.   Neurological: He is alert.   Skin: Skin is warm. Capillary refill takes less than 2 seconds.       Significant Labs:  CBC:   Recent Labs   Lab 10/28/19  0525   WBC 13.87*   RBC 3.65*   HGB 11.1*   HCT 35.4*      MCV 97   MCH 30.4   MCHC 31.4*     BMP:   Recent Labs   Lab 10/28/19  0525   GLU 90      K 4.3      CO2 27   BUN 4*   CREATININE 0.6   CALCIUM 8.5*   MG 1.9       Significant Diagnostics:  I have reviewed and interpreted all pertinent imaging results/findings within the past 24 hours.    Assessment/Plan:     * Pancreas cyst  59 y.o male PMH of IPMN in the uncinate that wass been followed appropriately for years, with recent increase in size as well as new PD dilation to 1.3cm. POD 3 of Whipple procedure.     - NPO  - Pathway   - PT/OT  - Encourage IS  - IVF 80ml/h  - OOBTC x 2  - Ambulate in pm   - Pulmonary toilet.   - D/C Chin   - TBD d/c of NG tube   - Wean off Oxygen           Humphrey Ramos MD  General Surgery  [facility]

## 2019-10-28 NOTE — PT/OT/SLP EVAL
Occupational Therapy   Evaluation    Name: Jose Rafael Campbell  MRN: [id number]  Admitting Diagnosis:  Pancreas cyst 3 Days Post-Op    Recommendations:     Discharge Recommendations: home  Discharge Equipment Recommendations:  none  Barriers to discharge:  None    Assessment:     Jose Rafael Campbell is a 59 y.o. male with a medical diagnosis of Pancreas cyst.  He presents with good motivation and participation during OT evaluation. Performance deficits affecting function: weakness, impaired endurance, impaired self care skills, impaired functional mobilty, gait instability, impaired balance.  Pt would benefit from skilled OT services in order to maximize independence with ADLs and facilitate safe discharge.      Rehab Prognosis: Good; patient would benefit from acute skilled OT services to address these deficits and reach maximum level of function.       Plan:     Patient to be seen 3 x/week to address the above listed problems via self-care/home management, therapeutic activities, therapeutic exercises  · Plan of Care Expires: 11/27/19  · Plan of Care Reviewed with: patient, spouse    Subjective     Chief Complaint: pain in abdomen  Patient/Family Comments/goals: to get better and go home    Occupational Profile:  Living Environment: Pt lives with wife and adult grandson in [facility] with threshold step to enter.   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   Patient found up in chair with blood pressure cuff, telemetry, pulse ox (continuous), PCA, peripheral IV, SCD, BJ drain upon OT entry to room.    General Precautions: Standard, fall   Orthopedic Precautions:N/A   Braces: N/A     Occupational Performance:    Bed Mobility:    · Not assessed, pt found in bedside chair upon OT arrival    Functional Mobility/Transfers:  · Patient completed Sit <> Stand Transfer with supervision  with  rolling walker   · Functional Mobility: Pt ambulated bedside chair <>sink with CGA and RW. No LOB, SOB, or c/o of dizziness    Activities of Daily Living:  · Grooming: contact guard assistance to brush teeth and wash face standing at sink    Cognitive/Visual Perceptual:  Cognitive/Psychosocial Skills:     -       Oriented to: Person, Place, Time and Situation   -       Follows Commands/attention:Follows multistep  commands  -       Communication: clear/fluent  -       Memory: No Deficits noted  -       Safety awareness/insight to disability: intact   -       Mood/Affect/Coping skills/emotional control: Appropriate to situation    Physical Exam:  Postural examination/scapula alignment:    -       Rounded shoulders  Skin integrity: Visible skin intact  Edema:  Moderate B hands  Upper Extremity Range of Motion:     -       Right Upper Extremity: WFL  -       Left Upper Extremity: WFL  Upper Extremity Strength:    -       Right Upper Extremity: WFL  -       Left Upper Extremity: WFL   Strength:    -       Right Upper Extremity: WFL  -       Left Upper Extremity: WFL  Fine Motor Coordination:    -       Intact    AMPAC 6 Click ADL:  AMPAC Total Score: 14    Treatment & Education:  -Therapist provided facilitation and instruction of proper body mechanics, energy conservation, and fall prevention strategies during tasks listed above.  -Pt educated on role of OT, POC and goals for therapy  -Pt educated on importance of OOB activities with staff member assistance and sitting OOB majority of the  day.   -Pt verbalized understanding. Pt expressed no further concerns/questions  -Whiteboard updated    Education:    Patient left up in chair with all lines intact, call button in reach and wife present    GOALS:   Multidisciplinary Problems     Occupational Therapy Goals        Problem: Occupational Therapy Goal    Goal Priority Disciplines Outcome Interventions   Occupational Therapy Goal     OT, PT/OT Ongoing, Progressing    Description:  Goals to be met by: 11/11/2019    Patient will increase functional independence with ADLs by performing:    UE Dressing with Stand-by Assistance.  LE Dressing with Stand-by Assistance.  Grooming while standing with [place].  Toileting from toilet with Stand-by Assistance for hygiene and clothing management.   Toilet transfer to toilet with Stand-by Assistance.                      History:     Past Medical History:   Diagnosis Date    Abdominal pain     Diarrhea     IPMN (intraductal papillary mucinous neoplasm)     Nausea     Pancreas cyst     Pancreatic duct dilated     Pancreatitis        Past Surgical History:   Procedure Laterality Date    APPENDECTOMY      ENDOSCOPIC ULTRASOUND OF UPPER GASTROINTESTINAL TRACT N/A 10/3/2019    Procedure: ULTRASOUND, UPPER GI TRACT, ENDOSCOPIC;  Surgeon: Harsh Clark MD;  Location: [facility] (2ND FLR);  Service: Endoscopy;  Laterality: N/A;    INGUINAL HERNIA REPAIR      NISSEN FUNDOPLICATION      WHIPPLE PROCEDURE N/A 10/25/2019    Procedure: WHIPPLE PROCEDURE;  Surgeon: Paul King MD;  [facility];  Service: General;  Laterality: N/A;       Time Tracking:     OT Date of Treatment: 10/28/19  OT Start Time: 1051  OT Stop Time: 1110  OT Total Time (min): 19 min    Billable Minutes:Evaluation 10  Self Care/Home Management 9    Halie Johnson OT  10/28/2019

## 2019-10-28 NOTE — PLAN OF CARE
PCP-  in [facility]     PT HAS A RIDE HOME AND FAMILY SUPPORT WITH WIFE AND GRANDSON.     PHARMACY- [facility]    Coverage Name  [employer] Auth Phone     Employer Group   Group Number     Subscriber Name ROEL SWEENEY Subscriber Number [id number]   Subscriber Date of Birth 1960            10/28/19 1541   Discharge Assessment   Assessment Type Discharge Planning Assessment   Confirmed/corrected address and phone number on facesheet? Yes   Assessment information obtained from? Patient   Expected Length of Stay (days) 3   Communicated expected length of stay with patient/caregiver yes   Prior to hospitilization cognitive status: Alert/Oriented   Prior to hospitalization functional status: Independent   Current cognitive status: Alert/Oriented   Current Functional Status: Independent   Lives With spouse;grandchild(kyleigh)   Able to Return to Prior Arrangements yes   Is patient able to care for self after discharge? Yes   Patient's perception of discharge disposition home health   Readmission Within the Last 30 Days no previous admission in last 30 days   Patient currently being followed by outpatient case management? No   Patient currently receives any other outside agency services? No   Equipment Currently Used at Home none   Do you have any problems affording any of your prescribed medications? No   Is the patient taking medications as prescribed? yes   Does the patient have transportation home? Yes   Transportation Anticipated family or friend will provide;car, drives self   Does the patient receive services at the [facility]? No   Discharge Plan A Home with family;Home Health   Discharge Plan B Rehab   DME Needed Upon Discharge  none   Patient/Family in Agreement with Plan yes

## 2019-10-28 NOTE — SUBJECTIVE & OBJECTIVE
Interval History:   No acute events overnight. NPO, VSS. No nausea or vomit. Adequate pain control with PCA. Tachycardic to 120s. Still in 2 L NC.   Ng tube output:   450cc 6002-3176  Not recorded 4670-2684    Medications:  Continuous Infusions:   0/9% NACL & POTASSIUM CHLORIDE 20 MEQ/L 125 mL/hr at 10/28/19 0253    hydromorphone in 0.9 % NaCl 6 mg/30 ml       Scheduled Meds:   famotidine (PF)  20 mg Intravenous BID    ketorolac  30 mg Intravenous Q6H    levalbuterol  0.63 mg Nebulization Q6H WAKE    mupirocin  1 g Nasal BID    sodium chloride 0.9%  1,000 mL Intravenous Once     PRN Meds:diphenhydrAMINE, influenza, naloxone, ondansetron, sodium chloride 0.9%     Review of patient's allergies indicates:   Allergen Reactions    Codeine Other (See Comments)     headache    Aspirin Rash    Penicillins Rash     Objective:     Vital Signs (Most Recent):  Temp: 98.5 °F (36.9 °C) (10/28/19 0742)  Pulse: (!) 117 (10/28/19 0742)  Resp: (!) 22 (10/28/19 0742)  BP: 135/89 (10/28/19 0742)  SpO2: 98 % (10/28/19 0742) Vital Signs (24h Range):  Temp:  [98 °F (36.7 °C)-98.8 °F (37.1 °C)] 98.5 °F (36.9 °C)  Pulse:  [105-127] 117  Resp:  [16-22] 22  SpO2:  [95 %-99 %] 98 %  BP: (134-150)/(84-94) 135/89     Weight: 65.3 kg (143 lb 15.4 oz)  Body mass index is 23.96 kg/m².    Intake/Output - Last 3 Shifts       10/26 0700 - 10/27 0659 10/27 0700 - 10/28 0659 10/28 0700 - 10/29 0659    I.V. (mL/kg) 4620.4 (70.8) 2697.9 (41.3)     NG/GT  120     IV Piggyback  1000     Total Intake(mL/kg) 4620.4 (70.8) 3817.9 (58.5)     Urine (mL/kg/hr) 1600 (1) 2560 (1.6)     Drains 865 510     Stool 0 0     Blood       Total Output 2465 3070     Net +2155.4 +747.9            Stool Occurrence 0 x 0 x           Physical Exam   Constitutional: He appears well-developed and well-nourished.   HENT:   Head: Normocephalic.   Eyes: Pupils are equal, round, and reactive to light. Conjunctivae and EOM are normal.   Neck: Normal range of motion. Neck  supple.   Cardiovascular: Normal rate and regular rhythm.   Pulmonary/Chest: Effort normal.   Abdominal: Soft.   Incions c/d/i covered with dermabond     Musculoskeletal: Normal range of motion.   Neurological: He is alert.   Skin: Skin is warm. Capillary refill takes less than 2 seconds.       Significant Labs:  CBC:   Recent Labs   Lab 10/28/19  0525   WBC 13.87*   RBC 3.65*   HGB 11.1*   HCT 35.4*      MCV 97   MCH 30.4   MCHC 31.4*     BMP:   Recent Labs   Lab 10/28/19  0525   GLU 90      K 4.3      CO2 27   BUN 4*   CREATININE 0.6   CALCIUM 8.5*   MG 1.9       Significant Diagnostics:  I have reviewed and interpreted all pertinent imaging results/findings within the past 24 hours.

## 2019-10-28 NOTE — PROGRESS NOTES
MD Galindo notified that patient HR sustaining 120's. Increase in BP, all other VSS. Pain rated 6/10. Oders received to monitor for now. Call for changes. RN WCTM

## 2019-10-28 NOTE — PT/OT/SLP PROGRESS
Physical Therapy Treatment    Patient Name:  Jose Rafael Campbell   MRN:  [id number]    Recommendations:     Discharge Recommendations:  (home no needs)   Discharge Equipment Recommendations: none   Barriers to discharge: None    Assessment:     Jose Rafael Campbell is a 59 y.o. male admitted with a medical diagnosis of Pancreas cyst.  He presents with the following impairments/functional limitations:  impaired functional mobilty, gait instability, impaired endurance pt nate treatment better being able to gait train farther. Pt will benefit from 1 more session of skilled PT then will be able to ambulate with family. Pt will be able to discharge home with no needs when medically stable. Pt is s/p Whipple 10/25/19.    Rehab Prognosis: Good; patient would benefit from acute skilled PT services to address these deficits and reach maximum level of function.    Recent Surgery: Procedure(s) (LRB):  WHIPPLE PROCEDURE (N/A) 3 Days Post-Op    Plan:     During this hospitalization, patient to be seen 4 x/week to address the identified rehab impairments via gait training, therapeutic activities and progress toward the following goals:    · Plan of Care Expires:  11/25/19    Subjective     Chief Complaint: pt had no complaints.   Patient/Family Comments/goals: get better and go home.   Pain/Comfort:  · Pain Rating 1: 0/10  · Pain Rating Post-Intervention 1: 0/10      Objective:     Communicated with nurse prior to session.  Patient found up in chair with telemetry, PCA, peripheral IV(COBY hose) upon PT entry to room.     General Precautions: Standard, fall   Orthopedic Precautions:    Braces:       Functional Mobility:  · Transfers:     · Sit to Stand:  stand by assistance with no AD  ·   · Gait: pt received gait training ~ 250 ft with SBA using rolling IV pole for balance.       AM-PAC 6 CLICK MOBILITY  Turning over in bed (including adjusting bedclothes, sheets and blankets)?: 3  Sitting down on and standing up from a chair with arms (e.g.,  wheelchair, bedside commode, etc.): 4  Moving from lying on back to sitting on the side of the bed?: 3  Moving to and from a bed to a chair (including a wheelchair)?: 4  Need to walk in hospital room?: 3  Climbing 3-5 steps with a railing?: 3  Basic Mobility Total Score: 20         Patient left up in chair with all lines intact, call button in reach and wife present..    GOALS:   Multidisciplinary Problems     Physical Therapy Goals        Problem: Physical Therapy Goal    Goal Priority Disciplines Outcome Goal Variances Interventions   Physical Therapy Goal     PT, PT/OT Ongoing, Progressing     Description:  Goals to be met by: 19    Patient will increase functional independence with mobility by performin. Sit to stand transfer with Stand-by Assistance - met 10/28  2. Gait  x 250 feet with Stand-by Assistance using LRAD. - met 10/28  3. Ascend/Descend 6 inch curb step with Stand-by Assistance.- not met  4 pt receive gait training ~ 250 ft modified Independent using rolling IV pole for balance if needed. - not met                        Time Tracking:     PT Received On: 10/28/19  PT Start Time: 1406     PT Stop Time: 1417  PT Total Time (min): 11 min     Billable Minutes: Gait Training 11 min    Treatment Type: Treatment  PT/PTA: PT     PTA Visit Number: 0     Leisa Cruz, PT  10/28/2019

## 2019-10-28 NOTE — CARE UPDATE
Rapid Response Respiratory Therapy ETCO2 Check     Time of visit: 915    Code Status: Prior   : 1960  Age: 59 y.o.  Weight:   Wt Readings from Last 1 Encounters:   10/26/19 65.3 kg (143 lb 15.4 oz)     Sex: male  Race: White   [facility]: [facility] A:   MRN: [id number]    SITUATION     Evaluated patient for: ETCO2 Compliance     BACKGROUND     Patient has a past medical history of Abdominal pain, Diarrhea, IPMN (intraductal papillary mucinous neoplasm), Nausea, Pancreas cyst, Pancreatic duct dilated, and Pancreatitis.    ASSESSMENT     Is the ETCO2 monitor on? (Yes/No)  yes   Is the patient wearing a cannula? (Yes/No) yes  Are ETCO2 orders placed? (Yes/No) yes  Is the patient on a PCA pump? (Yes/No) yes  ETCO2 monitored: ETCO2 (mmHg): 35 mmHg  O2 Device/Concentration:   Pulse:  Respiratory rate:  Temperature: Temp: 98.5 °F (36.9 °C) BP: BP: 135/89 SpO2:   Level of Consciousness: Level of Consciousness (AVPU): alert  Respiratory Effort: Respiratory Effort: Unlabored  Expansion/Accessory Muscle Usage: Expansion/Accessory Muscles/Retractions: expansion symmetric  All Lung Field Breath Sounds: All Lung Fields Breath Sounds: diminished  CLEOPATRA Breath Sounds: clear  LLL Breath Sounds: diminished  RUL Breath Sounds: clear  RML Breath Sounds: diminished  RLL Breath Sounds: diminished  Most recent blood gas:   Recent Labs     10/25/19  1217   PH 7.348*   PCO2 40.3   PO2 69*   HCO3 22.2*   POCSATURATED 93*   BE -3     Ambu at bedside: Ambu bag with the patient?: Yes, Adult Ambu    INTERVENTIONS/RECOMMENDATIONS           PHYSICIAN ESCALATION     Physician Escalation (Yes/No):      Care discussed with:   Discussed plan of care primary RT,      FOLLOW-UP     Please call back the Rapid Response RT, Sangeetha Donnelly, RRT at x [phone] for any questions or concerns.               3

## 2019-10-29 LAB
ALBUMIN SERPL BCP-MCNC: 2.4 G/DL (ref 3.5–5.2)
ALP SERPL-CCNC: 55 U/L (ref 55–135)
ALT SERPL W/O P-5'-P-CCNC: 25 U/L (ref 10–44)
ANION GAP SERPL CALC-SCNC: 11 MMOL/L (ref 8–16)
AST SERPL-CCNC: 20 U/L (ref 10–40)
BASOPHILS # BLD AUTO: 0.04 K/UL (ref 0–0.2)
BASOPHILS NFR BLD: 0.3 % (ref 0–1.9)
BILIRUB SERPL-MCNC: 0.5 MG/DL (ref 0.1–1)
BLD PROD TYP BPU: NORMAL
BLD PROD TYP BPU: NORMAL
BLOOD UNIT EXPIRATION DATE: NORMAL
BLOOD UNIT EXPIRATION DATE: NORMAL
BLOOD UNIT TYPE CODE: 5100
BLOOD UNIT TYPE CODE: 5100
BLOOD UNIT TYPE: NORMAL
BLOOD UNIT TYPE: NORMAL
BUN SERPL-MCNC: 6 MG/DL (ref 8–23)
CALCIUM SERPL-MCNC: 8.4 MG/DL (ref 8.7–10.5)
CHLORIDE SERPL-SCNC: 105 MMOL/L (ref 95–110)
CO2 SERPL-SCNC: 22 MMOL/L (ref 23–29)
CODING SYSTEM: NORMAL
CODING SYSTEM: NORMAL
CREAT SERPL-MCNC: 0.6 MG/DL (ref 0.5–1.4)
DIFFERENTIAL METHOD: ABNORMAL
DISPENSE STATUS: NORMAL
DISPENSE STATUS: NORMAL
EOSINOPHIL # BLD AUTO: 0.2 K/UL (ref 0–0.5)
EOSINOPHIL NFR BLD: 1.4 % (ref 0–8)
ERYTHROCYTE [DISTWIDTH] IN BLOOD BY AUTOMATED COUNT: 13.3 % (ref 11.5–14.5)
EST. GFR  (AFRICAN AMERICAN): >60 ML/MIN/1.73 M^2
EST. GFR  (NON AFRICAN AMERICAN): >60 ML/MIN/1.73 M^2
GLUCOSE SERPL-MCNC: 71 MG/DL (ref 70–110)
HCT VFR BLD AUTO: 32.3 % (ref 40–54)
HGB BLD-MCNC: 10.7 G/DL (ref 14–18)
IMM GRANULOCYTES # BLD AUTO: 0.07 K/UL (ref 0–0.04)
IMM GRANULOCYTES NFR BLD AUTO: 0.6 % (ref 0–0.5)
LYMPHOCYTES # BLD AUTO: 0.7 K/UL (ref 1–4.8)
LYMPHOCYTES NFR BLD: 5.9 % (ref 18–48)
MAGNESIUM SERPL-MCNC: 1.9 MG/DL (ref 1.6–2.6)
MCH RBC QN AUTO: 30.6 PG (ref 27–31)
MCHC RBC AUTO-ENTMCNC: 33.1 G/DL (ref 32–36)
MCV RBC AUTO: 92 FL (ref 82–98)
MONOCYTES # BLD AUTO: 1.3 K/UL (ref 0.3–1)
MONOCYTES NFR BLD: 10.6 % (ref 4–15)
NEUTROPHILS # BLD AUTO: 9.8 K/UL (ref 1.8–7.7)
NEUTROPHILS NFR BLD: 81.2 % (ref 38–73)
NRBC BLD-RTO: 0 /100 WBC
PLATELET # BLD AUTO: 257 K/UL (ref 150–350)
PMV BLD AUTO: 9.7 FL (ref 9.2–12.9)
POTASSIUM SERPL-SCNC: 4.4 MMOL/L (ref 3.5–5.1)
PROT SERPL-MCNC: 5.9 G/DL (ref 6–8.4)
RBC # BLD AUTO: 3.5 M/UL (ref 4.6–6.2)
SODIUM SERPL-SCNC: 138 MMOL/L (ref 136–145)
TRANS ERYTHROCYTES VOL PATIENT: NORMAL ML
TRANS ERYTHROCYTES VOL PATIENT: NORMAL ML
WBC # BLD AUTO: 12.11 K/UL (ref 3.9–12.7)

## 2019-10-29 PROCEDURE — 25000003 PHARM REV CODE 250: Performed by: STUDENT IN AN ORGANIZED HEALTH CARE EDUCATION/TRAINING PROGRAM

## 2019-10-29 PROCEDURE — 83735 ASSAY OF MAGNESIUM: CPT

## 2019-10-29 PROCEDURE — 94770 HC EXHALED C02 TEST: CPT

## 2019-10-29 PROCEDURE — 94664 DEMO&/EVAL PT USE INHALER: CPT

## 2019-10-29 PROCEDURE — 20600001 HC STEP DOWN PRIVATE ROOM

## 2019-10-29 PROCEDURE — 63600175 PHARM REV CODE 636 W HCPCS: Performed by: STUDENT IN AN ORGANIZED HEALTH CARE EDUCATION/TRAINING PROGRAM

## 2019-10-29 PROCEDURE — 94640 AIRWAY INHALATION TREATMENT: CPT

## 2019-10-29 PROCEDURE — 80053 COMPREHEN METABOLIC PANEL: CPT

## 2019-10-29 PROCEDURE — 99900035 HC TECH TIME PER 15 MIN (STAT)

## 2019-10-29 PROCEDURE — 36415 COLL VENOUS BLD VENIPUNCTURE: CPT

## 2019-10-29 PROCEDURE — 94761 N-INVAS EAR/PLS OXIMETRY MLT: CPT

## 2019-10-29 PROCEDURE — 25000242 PHARM REV CODE 250 ALT 637 W/ HCPCS: Performed by: STUDENT IN AN ORGANIZED HEALTH CARE EDUCATION/TRAINING PROGRAM

## 2019-10-29 PROCEDURE — 85025 COMPLETE CBC W/AUTO DIFF WBC: CPT

## 2019-10-29 PROCEDURE — S0028 INJECTION, FAMOTIDINE, 20 MG: HCPCS | Performed by: STUDENT IN AN ORGANIZED HEALTH CARE EDUCATION/TRAINING PROGRAM

## 2019-10-29 PROCEDURE — 27000221 HC OXYGEN, UP TO 24 HOURS

## 2019-10-29 PROCEDURE — 94799 UNLISTED PULMONARY SVC/PX: CPT

## 2019-10-29 RX ORDER — OXYCODONE AND ACETAMINOPHEN 5; 325 MG/1; MG/1
1 TABLET ORAL EVERY 4 HOURS PRN
Status: DISCONTINUED | OUTPATIENT
Start: 2019-10-29 | End: 2019-11-02 | Stop reason: HOSPADM

## 2019-10-29 RX ORDER — OXYCODONE AND ACETAMINOPHEN 10; 325 MG/1; MG/1
1 TABLET ORAL EVERY 4 HOURS PRN
Status: DISCONTINUED | OUTPATIENT
Start: 2019-10-29 | End: 2019-11-02 | Stop reason: HOSPADM

## 2019-10-29 RX ADMIN — KETOROLAC TROMETHAMINE 30 MG: 30 INJECTION, SOLUTION INTRAMUSCULAR; INTRAVENOUS at 05:10

## 2019-10-29 RX ADMIN — FAMOTIDINE 20 MG: 10 INJECTION, SOLUTION INTRAVENOUS at 08:10

## 2019-10-29 RX ADMIN — OXYCODONE HYDROCHLORIDE AND ACETAMINOPHEN 1 TABLET: 5; 325 TABLET ORAL at 01:10

## 2019-10-29 RX ADMIN — LEVALBUTEROL HYDROCHLORIDE 0.63 MG: 0.63 SOLUTION RESPIRATORY (INHALATION) at 07:10

## 2019-10-29 RX ADMIN — MUPIROCIN 1 G: 20 OINTMENT TOPICAL at 09:10

## 2019-10-29 RX ADMIN — MUPIROCIN 1 G: 20 OINTMENT TOPICAL at 08:10

## 2019-10-29 RX ADMIN — LEVALBUTEROL HYDROCHLORIDE 0.63 MG: 0.63 SOLUTION RESPIRATORY (INHALATION) at 01:10

## 2019-10-29 RX ADMIN — OXYCODONE HYDROCHLORIDE AND ACETAMINOPHEN 1 TABLET: 10; 325 TABLET ORAL at 09:10

## 2019-10-29 RX ADMIN — SODIUM CHLORIDE AND POTASSIUM CHLORIDE: .9; .15 SOLUTION INTRAVENOUS at 06:10

## 2019-10-29 RX ADMIN — SODIUM CHLORIDE AND POTASSIUM CHLORIDE: .9; .15 SOLUTION INTRAVENOUS at 05:10

## 2019-10-29 RX ADMIN — OXYCODONE HYDROCHLORIDE AND ACETAMINOPHEN 1 TABLET: 5; 325 TABLET ORAL at 05:10

## 2019-10-29 NOTE — PLAN OF CARE
NGT pulled this am. No nausea/vomiting s/p removal. Voiding without difficulty. Tolerating sips/chips. Dilaudid PCA infusing per order, pain controlled with same. BJ bulb with minimal output. POC reviewed with patient and wife. No questions or concerns at this time. WCTM.

## 2019-10-29 NOTE — PLAN OF CARE
POC reviewed with patient and spouse. Verbal understanding received. Patient aox4 VSS, 1 L NC. Significant VS increase with activity. Returns to baseline upon resting. ML incision with DB BINDU Clean dry intact. Abdomen soft. BJ to RLQ with dark SS drainage. Patient 1 assist up to chair. Pain well controlled with PCA and scheduled Toradol. Pain complaint of headache and incisional pain. Concentrated UOP with adequate amount. No significant events overnight. Call light in reach. Wife at beside. RN ARMAND

## 2019-10-29 NOTE — PLAN OF CARE
Plan of care reviewed with pt and wife. Pt AAOx's4, vital signs stable. Pt on room air. Midline incision intact. RLQ miriam drain intact. Pt tolerating a clear liquid diet well no nausea or vomiting noted. Pt ambulated multiple times throughout the day independently and showered today. Remains free from falls or injuries. No acute events at this time. PRN medication helping to tolerate pain. WCTM

## 2019-10-29 NOTE — SUBJECTIVE & OBJECTIVE
Interval History:   No acute events overnight. NPO, VSS. No nausea or vomit. Adequate pain control with PCA. Tachycardic to 120s. NSR   Drain output: 55cc      Medications:  Continuous Infusions:   0/9% NACL & POTASSIUM CHLORIDE 20 MEQ/L 125 mL/hr at 10/29/19 0520    hydromorphone in 0.9 % NaCl 6 mg/30 ml       Scheduled Meds:   famotidine (PF)  20 mg Intravenous BID    levalbuterol  0.63 mg Nebulization Q6H WAKE    mupirocin  1 g Nasal BID     PRN Meds:diphenhydrAMINE, influenza, naloxone, ondansetron, sodium chloride 0.9%     Review of patient's allergies indicates:   Allergen Reactions    Codeine Other (See Comments)     headache    Aspirin Rash    Penicillins Rash     Objective:     Vital Signs (Most Recent):  Temp: 98.3 °F (36.8 °C) (10/28/19 1933)  Pulse: 100 (10/29/19 0310)  Resp: 17 (10/29/19 0015)  BP: 139/82 (10/28/19 1933)  SpO2: 95 % (10/29/19 0015) Vital Signs (24h Range):  Temp:  [97.5 °F (36.4 °C)-98.5 °F (36.9 °C)] 98.3 °F (36.8 °C)  Pulse:  [100-124] 100  Resp:  [17-24] 17  SpO2:  [93 %-98 %] 95 %  BP: (133-142)/(78-89) 139/82     Weight: 65.3 kg (143 lb 15.4 oz)  Body mass index is 23.96 kg/m².    Intake/Output - Last 3 Shifts       10/27 0700 - 10/28 0659 10/28 0700 - 10/29 0659 10/29 0700 - 10/30 0659    I.V. (mL/kg) 2697.9 (41.3) 3008.3 (46.1)     NG/      IV Piggyback 1000      Total Intake(mL/kg) 3817.9 (58.5) 3008.3 (46.1)     Urine (mL/kg/hr) 2560 (1.6) 1375 (0.9)     Emesis/NG output  0     Drains 510 605     Stool 0 0     Total Output 3070 1980     Net +747.9 +1028.3            Urine Occurrence  5 x     Stool Occurrence 0 x 0 x     Emesis Occurrence  0 x           Physical Exam   Constitutional: He appears well-developed and well-nourished.   HENT:   Head: Normocephalic.   Eyes: Pupils are equal, round, and reactive to light. Conjunctivae and EOM are normal.   Neck: Normal range of motion. Neck supple.   Cardiovascular: Normal rate and regular rhythm.   Pulmonary/Chest:  Effort normal.   Abdominal: Soft.   Incions c/d/i covered with dermabond     Musculoskeletal: Normal range of motion.   Neurological: He is alert.   Skin: Skin is warm. Capillary refill takes less than 2 seconds.       Significant Labs:  CBC:   Recent Labs   Lab 10/29/19  0517   WBC 12.11   RBC 3.50*   HGB 10.7*   HCT 32.3*      MCV 92   MCH 30.6   MCHC 33.1     BMP:   Recent Labs   Lab 10/29/19  0516   GLU 71      K 4.4      CO2 22*   BUN 6*   CREATININE 0.6   CALCIUM 8.4*   MG 1.9       Significant Diagnostics:  I have reviewed and interpreted all pertinent imaging results/findings within the past 24 hours.

## 2019-10-29 NOTE — PROGRESS NOTES
[facility]  General Surgery  Progress Note    Subjective:     History of Present Illness:  No notes on file    Post-Op Info:  Procedure(s) (LRB):  WHIPPLE PROCEDURE (N/A)   4 Days Post-Op     Interval History:   No acute events overnight. NPO, VSS. No nausea or vomit. Adequate pain control with PCA. Tachycardic to 120s. NSR Passing gas, no bowel movements.   Drain output: 55cc      Medications:  Continuous Infusions:   0/9% NACL & POTASSIUM CHLORIDE 20 MEQ/L 125 mL/hr at 10/29/19 0520    hydromorphone in 0.9 % NaCl 6 mg/30 ml       Scheduled Meds:   famotidine (PF)  20 mg Intravenous BID    levalbuterol  0.63 mg Nebulization Q6H WAKE    mupirocin  1 g Nasal BID     PRN Meds:diphenhydrAMINE, influenza, naloxone, ondansetron, sodium chloride 0.9%     Review of patient's allergies indicates:   Allergen Reactions    Codeine Other (See Comments)     headache    Aspirin Rash    Penicillins Rash     Objective:     Vital Signs (Most Recent):  Temp: 98.3 °F (36.8 °C) (10/28/19 1933)  Pulse: 100 (10/29/19 0310)  Resp: 17 (10/29/19 0015)  BP: 139/82 (10/28/19 1933)  SpO2: 95 % (10/29/19 0015) Vital Signs (24h Range):  Temp:  [97.5 °F (36.4 °C)-98.5 °F (36.9 °C)] 98.3 °F (36.8 °C)  Pulse:  [100-124] 100  Resp:  [17-24] 17  SpO2:  [93 %-98 %] 95 %  BP: (133-142)/(78-89) 139/82     Weight: 65.3 kg (143 lb 15.4 oz)  Body mass index is 23.96 kg/m².    Intake/Output - Last 3 Shifts       10/27 0700 - 10/28 0659 10/28 0700 - 10/29 0659 10/29 0700 - 10/30 0659    I.V. (mL/kg) 2697.9 (41.3) 3008.3 (46.1)     NG/      IV Piggyback 1000      Total Intake(mL/kg) 3817.9 (58.5) 3008.3 (46.1)     Urine (mL/kg/hr) 2560 (1.6) 1375 (0.9)     Emesis/NG output  0     Drains 510 605     Stool 0 0     Total Output 3070 1980     Net +747.9 +1028.3            Urine Occurrence  5 x     Stool Occurrence 0 x 0 x     Emesis Occurrence  0 x           Physical Exam   Constitutional: He appears well-developed and  well-nourished.   HENT:   Head: Normocephalic.   Eyes: Pupils are equal, round, and reactive to light. Conjunctivae and EOM are normal.   Neck: Normal range of motion. Neck supple.   Cardiovascular: Normal rate and regular rhythm.   Pulmonary/Chest: Effort normal.   Abdominal: Soft.   Incions c/d/i covered with dermabond     Musculoskeletal: Normal range of motion.   Neurological: He is alert.   Skin: Skin is warm. Capillary refill takes less than 2 seconds.       Significant Labs:  CBC:   Recent Labs   Lab 10/29/19  0517   WBC 12.11   RBC 3.50*   HGB 10.7*   HCT 32.3*      MCV 92   MCH 30.6   MCHC 33.1     BMP:   Recent Labs   Lab 10/29/19  0516   GLU 71      K 4.4      CO2 22*   BUN 6*   CREATININE 0.6   CALCIUM 8.4*   MG 1.9       Significant Diagnostics:  I have reviewed and interpreted all pertinent imaging results/findings within the past 24 hours.    Assessment/Plan:     * Pancreas cyst  59 y.o male PMH of IPMN in the [place] that wass been followed appropriately for years, with recent increase in size as well as new PD dilation to 1.3cm. POD 4 of Whipple procedure.     - CLD  - Pathway    - PT/OT  - Encourage IS  - IVF 152ml/h  - OOBTC x 2  - Ambulate x3  - Pulmonary toilet.   - OK to Shower  - Step down to GIS  - Continue telemtry        Humphrey Ramos MD  General Surgery  [facility]

## 2019-10-29 NOTE — ASSESSMENT & PLAN NOTE
59 y.o male PMH of IPMN in the unc[place] that wass been followed appropriately for years, with recent increase in size as well as new PD dilation to 1.3cm. POD 4 of Whipple procedure.     - CLD  - Pathway    - PT/OT  - Encourage IS  - IVF 152ml/h  - OOBTC x 2  - Ambulate x3  - Pulmonary toilet.   - OK to Shower  - Step down to GISSU  - Continue telemtry

## 2019-10-30 LAB
ALBUMIN SERPL BCP-MCNC: 2.5 G/DL (ref 3.5–5.2)
ALP SERPL-CCNC: 52 U/L (ref 55–135)
ALT SERPL W/O P-5'-P-CCNC: 20 U/L (ref 10–44)
ANION GAP SERPL CALC-SCNC: 11 MMOL/L (ref 8–16)
AST SERPL-CCNC: 17 U/L (ref 10–40)
BASOPHILS # BLD AUTO: 0.06 K/UL (ref 0–0.2)
BASOPHILS NFR BLD: 0.6 % (ref 0–1.9)
BILIRUB SERPL-MCNC: 0.4 MG/DL (ref 0.1–1)
BUN SERPL-MCNC: 6 MG/DL (ref 8–23)
CALCIUM SERPL-MCNC: 8.7 MG/DL (ref 8.7–10.5)
CHLORIDE SERPL-SCNC: 102 MMOL/L (ref 95–110)
CO2 SERPL-SCNC: 23 MMOL/L (ref 23–29)
CREAT SERPL-MCNC: 0.6 MG/DL (ref 0.5–1.4)
CRP SERPL-MCNC: 169.06 MG/L (ref 0–3.19)
DIFFERENTIAL METHOD: ABNORMAL
EOSINOPHIL # BLD AUTO: 0.3 K/UL (ref 0–0.5)
EOSINOPHIL NFR BLD: 2.9 % (ref 0–8)
ERYTHROCYTE [DISTWIDTH] IN BLOOD BY AUTOMATED COUNT: 13.2 % (ref 11.5–14.5)
EST. GFR  (AFRICAN AMERICAN): >60 ML/MIN/1.73 M^2
EST. GFR  (NON AFRICAN AMERICAN): >60 ML/MIN/1.73 M^2
GLUCOSE SERPL-MCNC: 82 MG/DL (ref 70–110)
HCT VFR BLD AUTO: 32.6 % (ref 40–54)
HGB BLD-MCNC: 10.7 G/DL (ref 14–18)
IMM GRANULOCYTES # BLD AUTO: 0.09 K/UL (ref 0–0.04)
IMM GRANULOCYTES NFR BLD AUTO: 1 % (ref 0–0.5)
LYMPHOCYTES # BLD AUTO: 0.7 K/UL (ref 1–4.8)
LYMPHOCYTES NFR BLD: 7.6 % (ref 18–48)
MAGNESIUM SERPL-MCNC: 1.7 MG/DL (ref 1.6–2.6)
MCH RBC QN AUTO: 30.8 PG (ref 27–31)
MCHC RBC AUTO-ENTMCNC: 32.8 G/DL (ref 32–36)
MCV RBC AUTO: 94 FL (ref 82–98)
MONOCYTES # BLD AUTO: 1.2 K/UL (ref 0.3–1)
MONOCYTES NFR BLD: 13.1 % (ref 4–15)
NEUTROPHILS # BLD AUTO: 7 K/UL (ref 1.8–7.7)
NEUTROPHILS NFR BLD: 74.8 % (ref 38–73)
NRBC BLD-RTO: 0 /100 WBC
PLATELET # BLD AUTO: 295 K/UL (ref 150–350)
PMV BLD AUTO: 9.2 FL (ref 9.2–12.9)
POTASSIUM SERPL-SCNC: 4.1 MMOL/L (ref 3.5–5.1)
PROT SERPL-MCNC: 6 G/DL (ref 6–8.4)
RBC # BLD AUTO: 3.47 M/UL (ref 4.6–6.2)
SODIUM SERPL-SCNC: 136 MMOL/L (ref 136–145)
WBC # BLD AUTO: 9.3 K/UL (ref 3.9–12.7)

## 2019-10-30 PROCEDURE — 20600001 HC STEP DOWN PRIVATE ROOM

## 2019-10-30 PROCEDURE — 63600175 PHARM REV CODE 636 W HCPCS: Performed by: NURSE PRACTITIONER

## 2019-10-30 PROCEDURE — 83735 ASSAY OF MAGNESIUM: CPT

## 2019-10-30 PROCEDURE — 25000003 PHARM REV CODE 250: Performed by: NURSE PRACTITIONER

## 2019-10-30 PROCEDURE — 97530 THERAPEUTIC ACTIVITIES: CPT

## 2019-10-30 PROCEDURE — 25000242 PHARM REV CODE 250 ALT 637 W/ HCPCS: Performed by: STUDENT IN AN ORGANIZED HEALTH CARE EDUCATION/TRAINING PROGRAM

## 2019-10-30 PROCEDURE — 97116 GAIT TRAINING THERAPY: CPT

## 2019-10-30 PROCEDURE — 86141 C-REACTIVE PROTEIN HS: CPT

## 2019-10-30 PROCEDURE — 94664 DEMO&/EVAL PT USE INHALER: CPT

## 2019-10-30 PROCEDURE — 94799 UNLISTED PULMONARY SVC/PX: CPT

## 2019-10-30 PROCEDURE — 99900035 HC TECH TIME PER 15 MIN (STAT)

## 2019-10-30 PROCEDURE — 94640 AIRWAY INHALATION TREATMENT: CPT

## 2019-10-30 PROCEDURE — 36415 COLL VENOUS BLD VENIPUNCTURE: CPT

## 2019-10-30 PROCEDURE — 85025 COMPLETE CBC W/AUTO DIFF WBC: CPT

## 2019-10-30 PROCEDURE — 97803 MED NUTRITION INDIV SUBSEQ: CPT

## 2019-10-30 PROCEDURE — 94761 N-INVAS EAR/PLS OXIMETRY MLT: CPT

## 2019-10-30 PROCEDURE — 80053 COMPREHEN METABOLIC PANEL: CPT

## 2019-10-30 PROCEDURE — 25000003 PHARM REV CODE 250: Performed by: STUDENT IN AN ORGANIZED HEALTH CARE EDUCATION/TRAINING PROGRAM

## 2019-10-30 RX ORDER — ENOXAPARIN SODIUM 100 MG/ML
40 INJECTION SUBCUTANEOUS EVERY 24 HOURS
Status: DISCONTINUED | OUTPATIENT
Start: 2019-10-30 | End: 2019-11-02 | Stop reason: HOSPADM

## 2019-10-30 RX ORDER — LANOLIN ALCOHOL/MO/W.PET/CERES
800 CREAM (GRAM) TOPICAL ONCE
Status: COMPLETED | OUTPATIENT
Start: 2019-10-30 | End: 2019-10-30

## 2019-10-30 RX ORDER — SIMVASTATIN 20 MG/1
40 TABLET, FILM COATED ORAL NIGHTLY
Status: DISCONTINUED | OUTPATIENT
Start: 2019-10-30 | End: 2019-11-02 | Stop reason: HOSPADM

## 2019-10-30 RX ORDER — FAMOTIDINE 20 MG/1
20 TABLET, FILM COATED ORAL 2 TIMES DAILY
Status: DISCONTINUED | OUTPATIENT
Start: 2019-10-30 | End: 2019-11-02 | Stop reason: HOSPADM

## 2019-10-30 RX ADMIN — OXYCODONE HYDROCHLORIDE AND ACETAMINOPHEN 1 TABLET: 10; 325 TABLET ORAL at 07:10

## 2019-10-30 RX ADMIN — FAMOTIDINE 20 MG: 20 TABLET ORAL at 11:10

## 2019-10-30 RX ADMIN — LEVALBUTEROL HYDROCHLORIDE 0.63 MG: 0.63 SOLUTION RESPIRATORY (INHALATION) at 07:10

## 2019-10-30 RX ADMIN — FAMOTIDINE 20 MG: 20 TABLET ORAL at 08:10

## 2019-10-30 RX ADMIN — SODIUM CHLORIDE AND POTASSIUM CHLORIDE: .9; .15 SOLUTION INTRAVENOUS at 11:10

## 2019-10-30 RX ADMIN — OXYCODONE HYDROCHLORIDE AND ACETAMINOPHEN 1 TABLET: 10; 325 TABLET ORAL at 01:10

## 2019-10-30 RX ADMIN — OXYCODONE HYDROCHLORIDE AND ACETAMINOPHEN 1 TABLET: 10; 325 TABLET ORAL at 03:10

## 2019-10-30 RX ADMIN — OXYCODONE HYDROCHLORIDE AND ACETAMINOPHEN 1 TABLET: 10; 325 TABLET ORAL at 08:10

## 2019-10-30 RX ADMIN — ENOXAPARIN SODIUM 40 MG: 100 INJECTION SUBCUTANEOUS at 06:10

## 2019-10-30 RX ADMIN — Medication 800 MG: at 11:10

## 2019-10-30 RX ADMIN — LEVALBUTEROL HYDROCHLORIDE 0.63 MG: 0.63 SOLUTION RESPIRATORY (INHALATION) at 01:10

## 2019-10-30 RX ADMIN — SIMVASTATIN 40 MG: 20 TABLET, FILM COATED ORAL at 08:10

## 2019-10-30 NOTE — PLAN OF CARE
Pt has met all goals, and pt and wife report no concerns with self-care and functional mobility. Pt is safe to discharge home with no needs.  Halie Johnson, OT  10/30/2019    Problem: Occupational Therapy Goal  Goal: Occupational Therapy Goal  Description  Goals to be met by: 11/11/2019    Patient will increase functional independence with ADLs by performing:    UE Dressing with Stand-by Assistance.  LE Dressing with Stand-by Assistance.  Grooming while standing with [place].  Toileting from toilet with Stand-by Assistance for hygiene and clothing management.   Toilet transfer to toilet with Stand-by Assistance.     Outcome: Met

## 2019-10-30 NOTE — PLAN OF CARE
SW continues to follow pt with medical team and assist with needs as indicated.    Davida Villaseñor, CARLEE  [facility]  [id number]

## 2019-10-30 NOTE — PROGRESS NOTES
[facility]  General Surgery  Progress Note    Subjective:     History of Present Illness:  No notes on file    Post-Op Info:  Procedure(s) (LRB):  WHIPPLE PROCEDURE (N/A)   5 Days Post-Op     Interval History:   No acute events overnight. Tolerated a CLD without any trouble. VSS. No nausea or vomit. Adequate pain control with oral meds. Tachycardic to 119s. NSR       Medications:  Continuous Infusions:   0/9% NACL & POTASSIUM CHLORIDE 20 MEQ/L 75 mL/hr at 10/29/19 1801     Scheduled Meds:   enoxaparin  40 mg Subcutaneous Daily    famotidine  20 mg Oral BID    levalbuterol  0.63 mg Nebulization Q6H WAKE    magnesium oxide  800 mg Oral Once    mupirocin  1 g Nasal BID    simvastatin  40 mg Oral QHS     PRN Meds:diphenhydrAMINE, influenza, naloxone, ondansetron, oxyCODONE-acetaminophen, oxyCODONE-acetaminophen, sodium chloride 0.9%     Review of patient's allergies indicates:   Allergen Reactions    Codeine Other (See Comments)     headache    Aspirin Rash    Penicillins Rash     Objective:     Vital Signs (Most Recent):  Temp: 99.3 °F (37.4 °C) (10/30/19 0826)  Pulse: (!) 113 (10/30/19 0826)  Resp: 20 (10/30/19 0826)  BP: (!) 161/94 (10/30/19 0826)  SpO2: (!) 93 % (10/30/19 0826) Vital Signs (24h Range):  Temp:  [97.6 °F (36.4 °C)-99.3 °F (37.4 °C)] 99.3 °F (37.4 °C)  Pulse:  [107-121] 113  Resp:  [16-27] 20  SpO2:  [92 %-96 %] 93 %  BP: (121-161)/(75-94) 161/94     Weight: 65.3 kg (143 lb 15.4 oz)  Body mass index is 23.96 kg/m².    Intake/Output - Last 3 Shifts       10/28 0700 - 10/29 0659 10/29 0700 - 10/30 0659 10/30 0700 - 10/31 0659    I.V. (mL/kg) 3008.3 (46.1) 738.8 (11.3)     NG/GT       IV Piggyback       Total Intake(mL/kg) 3008.3 (46.1) 738.8 (11.3)     Urine (mL/kg/hr) 1375 (0.9)      Emesis/NG output 0      Drains 605      Stool 0      Total Output 1980      Net +1028.3 +738.8            Urine Occurrence 5 x 2 x     Stool Occurrence 0 x 0 x     Emesis Occurrence 0 x             Physical Exam   Constitutional: He appears well-developed and well-nourished.   HENT:   Head: Normocephalic.   Eyes: Pupils are equal, round, and reactive to light. Conjunctivae and EOM are normal.   Neck: Normal range of motion. Neck supple.   Cardiovascular: Normal rate and regular rhythm.   Pulmonary/Chest: Effort normal.   Abdominal: Soft.   Incions c/d/i covered with dermabond     Musculoskeletal: Normal range of motion.   Neurological: He is alert.   Skin: Skin is warm. Capillary refill takes less than 2 seconds.       Significant Labs:  CBC:   Recent Labs   Lab 10/30/19  0416   WBC 9.30   RBC 3.47*   HGB 10.7*   HCT 32.6*      MCV 94   MCH 30.8   MCHC 32.8     BMP:   Recent Labs   Lab 10/30/19  0417   GLU 82      K 4.1      CO2 23   BUN 6*   CREATININE 0.6   CALCIUM 8.7   MG 1.7       Significant Diagnostics:  I have reviewed and interpreted all pertinent imaging results/findings within the past 24 hours.    Assessment/Plan:     * Pancreas cyst  59 y.o male PMH of IPMN in the [place] that wass been followed appropriately for years, with recent increase in size as well as new PD dilation to 1.3cm. POD 5 of Whipple procedure.     - Regular diet  - Pathway    - PT/OT  - Encourage IS  - OOBTC x 2  - Ambulate x3  - Pulmonary toilet.   - OK to Shower  - Continue telemtry  - Lovenox, SCDs.         Humphrey Ramos MD  General Surgery  [facility]

## 2019-10-30 NOTE — PLAN OF CARE
Problem: Oral Intake Inadequate  Goal: Improved Oral Intake  Outcome: Ongoing, Progressing       Recommendations    1. Continue Regular diet as tolerated.   2. Add Boost Plus with meals to aid in caloric intake.   3. RD following.     Goals: Meet % EEN, EPN  Nutrition Goal Status: progressing towards goal

## 2019-10-30 NOTE — PLAN OF CARE
Pt was in very bad pain after eating his clear liquid tray this am. Resolved with percocet. XRAY taken, Lots of gas, but no ileus. Pt passes gas, no BM. Regular food by lunch, eats 25% and a boost. No nausea. Pain controlled. Urinates in bathroom. Ambulated in hallway x3. IV fluids stopped.

## 2019-10-30 NOTE — PLAN OF CARE
Jose Rafael Campbell tolerated treatment well today. He demonstrates good strength and independence with all functional mobility. He ambulated 300 feet independently while pushing his IV pole. He demonstrates good upright posture, step length, kristi and stability with ambulation, no losses of balance noted. Educated patient on walking 3-4x per day with family while he is in the hospital to maintain strength and mobility, and patient reported understanding. Discussed discharge from acute PT services with patient and/or family as patient is mobilizing well with family and/or nursing; verbalized understanding. Jose Rafael Campbell has no further acute PT needs, will now discharge from acute PT services.    Problem: Physical Therapy Goal  Goal: Physical Therapy Goal  Description  Goals to be met by: 19    Patient will increase functional independence with mobility by performin. Sit to stand transfer with Stand-by Assistance - met 10/28  2. Gait  x 250 feet with Stand-by Assistance using LRAD. - met 10/28  3. Ascend/Descend 6 inch curb step with Stand-by Assistance.- Not met but per assessment of functional mobility and ambulation, patient could ascend/descend 6 in. Curb without difficulty.  4 pt receive gait training ~ 250 ft modified Independent using rolling IV pole for balance if needed. - MET 10/28        Outcome: Ongoing, Progressing    Mckay Fitzpatrick, PT  10/30/2019

## 2019-10-30 NOTE — SUBJECTIVE & OBJECTIVE
Interval History:   No acute events overnight. Tolerated a CLD without any trouble. VSS. No nausea or vomit. Adequate pain control with oral meds. Tachycardic to 119s. NSR       Medications:  Continuous Infusions:   0/9% NACL & POTASSIUM CHLORIDE 20 MEQ/L 75 mL/hr at 10/29/19 1801     Scheduled Meds:   enoxaparin  40 mg Subcutaneous Daily    famotidine  20 mg Oral BID    levalbuterol  0.63 mg Nebulization Q6H WAKE    magnesium oxide  800 mg Oral Once    mupirocin  1 g Nasal BID    simvastatin  40 mg Oral QHS     PRN Meds:diphenhydrAMINE, influenza, naloxone, ondansetron, oxyCODONE-acetaminophen, oxyCODONE-acetaminophen, sodium chloride 0.9%     Review of patient's allergies indicates:   Allergen Reactions    Codeine Other (See Comments)     headache    Aspirin Rash    Penicillins Rash     Objective:     Vital Signs (Most Recent):  Temp: 99.3 °F (37.4 °C) (10/30/19 0826)  Pulse: (!) 113 (10/30/19 0826)  Resp: 20 (10/30/19 0826)  BP: (!) 161/94 (10/30/19 0826)  SpO2: (!) 93 % (10/30/19 0826) Vital Signs (24h Range):  Temp:  [97.6 °F (36.4 °C)-99.3 °F (37.4 °C)] 99.3 °F (37.4 °C)  Pulse:  [107-121] 113  Resp:  [16-27] 20  SpO2:  [92 %-96 %] 93 %  BP: (121-161)/(75-94) 161/94     Weight: 65.3 kg (143 lb 15.4 oz)  Body mass index is 23.96 kg/m².    Intake/Output - Last 3 Shifts       10/28 0700 - 10/29 0659 10/29 0700 - 10/30 0659 10/30 0700 - 10/31 0659    I.V. (mL/kg) 3008.3 (46.1) 738.8 (11.3)     NG/GT       IV Piggyback       Total Intake(mL/kg) 3008.3 (46.1) 738.8 (11.3)     Urine (mL/kg/hr) 1375 (0.9)      Emesis/NG output 0      Drains 605      Stool 0      Total Output 1980      Net +1028.3 +738.8            Urine Occurrence 5 x 2 x     Stool Occurrence 0 x 0 x     Emesis Occurrence 0 x            Physical Exam   Constitutional: He appears well-developed and well-nourished.   HENT:   Head: Normocephalic.   Eyes: Pupils are equal, round, and reactive to light. Conjunctivae and EOM are normal.   Neck:  Normal range of motion. Neck supple.   Cardiovascular: Normal rate and regular rhythm.   Pulmonary/Chest: Effort normal.   Abdominal: Soft.   Incions c/d/i covered with dermabond     Musculoskeletal: Normal range of motion.   Neurological: He is alert.   Skin: Skin is warm. Capillary refill takes less than 2 seconds.       Significant Labs:  CBC:   Recent Labs   Lab 10/30/19  0416   WBC 9.30   RBC 3.47*   HGB 10.7*   HCT 32.6*      MCV 94   MCH 30.8   MCHC 32.8     BMP:   Recent Labs   Lab 10/30/19  0417   GLU 82      K 4.1      CO2 23   BUN 6*   CREATININE 0.6   CALCIUM 8.7   MG 1.7       Significant Diagnostics:  I have reviewed and interpreted all pertinent imaging results/findings within the past 24 hours.

## 2019-10-30 NOTE — PROGRESS NOTES
"[facility]  Adult Nutrition  Progress Note    SUMMARY       Recommendations    1. Continue Regular diet as tolerated.   2. Add Boost Plus with meals to aid in caloric intake.   3. RD following.     Goals: Meet % EEN, EPN  Nutrition Goal Status: progressing towards goal  Communication of RD Recs: (POC)    Reason for Assessment    Reason For Assessment: RD follow-up  Diagnosis: other (see comments)(Pancreas cyst)  Relevant Medical History: IPMN  Interdisciplinary Rounds: did not attend  General Information Comments: POD5 whipple. Pt tolerating Full Liquids with no N/V/D/C. Diet advanced to Regular this AM. Would like Boost Plus with meals to aid in caloric intake. Pt continues to appear thin, but nourished.  Nutrition Discharge Planning: Adequate nutrition    Nutrition Risk Screen    Nutrition Risk Screen: no indicators present    Nutrition/Diet History    Patient Reported Diet/Restrictions/Preferences: general  Spiritual, Cultural Beliefs, [employer] Practices, Values that Affect Care: no  Factors Affecting Nutritional Intake: None identified at this time    Anthropometrics    Temp: 99.3 °F (37.4 °C)  Height Method: Measured  Height: 5' 5" (165.1 cm)  Height (inches): 65 in  Weight Method: Bed Scale  Weight: 65.3 kg (143 lb 15.4 oz)  Weight (lb): 143.96 lb  Ideal Body Weight (IBW), Male: 136 lb  % Ideal Body Weight, Male (lb): 105.85 lb  BMI (Calculated): 24  BMI Grade: 18.5-24.9 - normal     Lab/Procedures/Meds    Pertinent Labs Reviewed: reviewed  Pertinent Labs Comments: BUN 6, alk phos 52  Pertinent Medications Reviewed: reviewed  Pertinent Medications Comments: IVF    Estimated/Assessed Needs    Weight Used For Calorie Calculations: 65.3 kg (143 lb 15.4 oz)  Energy Calorie Requirements (kcal): 1731 kcal/d  Energy Need Method: [place]-St Jeor(1.25 PAL)  Protein Requirements: 65-79 g/d (1-1.2 g/kg)  Weight Used For Protein Calculations: 65.3 kg (143 lb 15.4 oz)     Estimated Fluid " Requirement Method: other (see comments)(Per MD or 1 mL/kcal)  RDA Method (mL): 1731     Nutrition Prescription Ordered    Current Diet Order: Regular    Evaluation of Received Nutrient/Fluid Intake    I/O: +8.437L since admit  Comments: LBM 10/24  Tolerance: tolerating  % Intake of Estimated Energy Needs: 50 - 75 %  % Meal Intake: 50 - 75 %    Nutrition Risk    Level of Risk/Frequency of Follow-up: (f/u 1 x wk)     Assessment and Plan    Nutrition Problem  Inadequate energy intake     Related to (etiology):   Inability to consume sufficient energy     Signs and Symptoms (as evidenced by):   NPO     Interventions (treatment strategy):  Collaboration of nutrition care with other providers      Nutrition Diagnosis Status:   Improving     Monitor and Evaluation    Food and Nutrient Intake: energy intake, food and beverage intake  Food and Nutrient Adminstration: diet order  Physical Activity and Function: nutrition-related ADLs and IADLs  Anthropometric Measurements: weight, weight change, body mass index  Biochemical Data, Medical Tests and Procedures: electrolyte and renal panel, gastrointestinal profile, glucose/endocrine profile, inflammatory profile, lipid profile  Nutrition-Focused Physical Findings: overall appearance     Nutrition Follow-Up    RD Follow-up?: Yes

## 2019-10-30 NOTE — ASSESSMENT & PLAN NOTE
59 y.o male PMH of IPMN in the [place] that wass been followed appropriately for years, with recent increase in size as well as new PD dilation to 1.3cm. POD 5 of Whipple procedure.     - Regular diet  - Pathway    - PT/OT  - Encourage IS  - OOBTC x 2  - Ambulate x3  - Pulmonary toilet.   - OK to Shower  - Continue telemtry  - Lovenox, SCDs.

## 2019-10-30 NOTE — PLAN OF CARE
AAO x 4. ML with DB BINDU. RLQ BJ drain. Ambulates to BR with stand by assistance. Tolerating clear liquid diet, no complaints of N/V overnight. VSS on RA. On tele running NSR, slightly tachy at times. Complained of pain overnight, received PRN meds. Pt remained free of falls overnight. POC reviewed with pt who verbalized understanding. Bed in low position, call light in reach. No signs of distress or concerns noted at this time. WCTM.

## 2019-10-30 NOTE — PT/OT/SLP PROGRESS
Occupational Therapy   Treatment and Discharge    Name: Jose Rafael Campbell  MRN: [id number]  Admitting Diagnosis:  Pancreas cyst  5 Days Post-Op    Recommendations:     Discharge Recommendations: home  Discharge Equipment Recommendations:  none  Barriers to discharge:  None    Assessment:     Jose Rafael Campbell is a 59 y.o. male with a medical diagnosis of Pancreas cyst.  He presents with good participation and motivation to go on a walk. Pt tolerated tx well. Pt and wife report independence with toileting and toilet transfers.  Pt ambulated ~450 feet pushing IV pole with SPV.     Plan:     · Pt is currently performing ADLs, functional mobility and transfers at baseline. OT services are not recommended at this time and pt is safe to discharge home.  · Plan of Care Expires: 11/27/19  · Plan of Care Reviewed with: patient    Subjective     Pain/Comfort:  · Pain Rating 1: 0/10    Objective:     Communicated with: RN prior to session.  Patient found HOB elevated with peripheral IV, telemetry upon OT entry to room.    General Precautions: Standard, fall   Orthopedic Precautions:N/A   Braces: N/A     Occupational Performance:     Bed Mobility:    · Patient completed Rolling/Turning to Left with  Mod I  · Patient completed Supine to Sit with mod I     Functional Mobility/Transfers:  · Patient completed Sit <> Stand Transfer with independence  with  no assistive device   · Functional Mobility: Pt ambulated ~450 feet with SPV pushing IV pole to simulate household and community distances. Pt required a seated rest break after ~200 ft. No LOB, SOB, or c/o of dizziness.     Activities of Daily Living:  · Lower Body Dressing: supervision to don shoes      AMPAC 6 Click ADL: 22    Treatment & Education:  -Therapist provided facilitation and instruction of proper body mechanics, energy conservation, and fall prevention strategies during tasks listed above.  -Pt educated on role of OT, POC and discharge from OT  -Pt educated on importance of  OOB activities with staff member assistance and sitting OOB majority of the day.   -Pt verbalized understanding. Pt expressed no further concerns/questions  -Whiteboard updated      Patient left up in chair with all lines intact, call button in reach and wife presentEducation:      GOALS:   Multidisciplinary Problems     Occupational Therapy Goals     Not on file          Multidisciplinary Problems (Resolved)        Problem: Occupational Therapy Goal    Goal Priority Disciplines Outcome Interventions   Occupational Therapy Goal   (Resolved)     OT, PT/OT Met    Description:  Goals to be met by: 11/11/2019    Patient will increase functional independence with ADLs by performing:    UE Dressing with Stand-by Assistance.  LE Dressing with Stand-by Assistance.  Grooming while standing with [place].  Toileting from toilet with Stand-by Assistance for hygiene and clothing management.   Toilet transfer to toilet with Stand-by Assistance.                      Time Tracking:     OT Date of Treatment: 10/30/19  OT Start Time: 0954  OT Stop Time: 1012  OT Total Time (min): 18 min    Billable Minutes:Therapeutic Activity 18    Halie Johnson OT  10/30/2019

## 2019-10-30 NOTE — PT/OT/SLP PROGRESS
Physical Therapy  Treatment and Discharge    Jose Rafael Campbell   [id number]    Time Tracking:     PT Received On: 10/30/19   PT Start Time: 1130   PT Stop Time: 1141   PT Total Time (min): 11 min    Billable Minutes: Gait Training 11      Recommendations:     Discharge recommendations: Home with family     Equipment recommendations: None    Barriers to Discharge: None    Patient Information:     Recent Surgery: Procedure(s) (LRB):  WHIPPLE PROCEDURE (N/A) 5 Days Post-Op    Diagnosis: Pancreas cyst    Length of Stay: 5 days    General Precautions: Standard, fall  Orthopedic Precautions: None    Assessment:     Jose Rafael Campbell tolerated treatment well today. He demonstrates good strength and independence with all functional mobility. He ambulated 300 feet independently while pushing his IV pole. He demonstrates good upright posture, step length, kristi and stability with ambulation, no losses of balance noted. Educated patient on walking 3-4x per day with family while he is in the hospital to maintain strength and mobility, and patient reported understanding. Discussed discharge from acute PT services with patient and/or family as patient is mobilizing well with family and/or nursing; verbalized understanding. Jose Rafael Campbell has no further acute PT needs, will now discharge from acute PT services.    Problem List: decreased endurance    Plan:     Discharge from acute PT services.    Plan of Care reviewed with: patient, spouse    Subjective:     Communicated with RN prior to evaluation, appropriate to see for treatment.    Pt found reclined in bedside chair upon PT entry to room, agreeable to treatment.    Does this patient have any cultural, spiritual, Nondenominational conflicts given the current situation? Patient has no barriers to learning. Patient verbalizes understanding of his/her program and goals and demonstrates them correctly. No cultural, spiritual, or educational needs identified.    Objective:     Patient found with:  telemetry, peripheral IV    Pain:  Pain Rating 1: 2/10 generalized abdomen  Pain Rating Post-Intervention 1: 2/10 generalized abdomen    Functional Mobility:    · Transfers:  · Sit to Stand: Sit to stand from bedside chair with independence x 1 trial(s)  · Stand to Sit: Stand to sit to edge of bed  with independence x 1 trial(s)    · Gait:  · He ambulated 300 feet independently while pushing his IV pole. He demonstrates good upright posture, step length, kristi and stability with ambulation, no losses of balance noted. feet  · Assist level: Independent  · Device: no AD    · Balance:  · Static Stand: Independent with no AD    Additional Therapeutic Activity/Exercises:     1. Discussed discharge from acute PT services with patient and/or family as patient is mobilizing well with family and/or nursing; verbalized understanding.    2. Whiteboard was updated.    AM-PAC 6 CLICK MOBILITY       Patient was left reclined in bedside chair with all lines intact, call button in reach and spouse present.    GOALS:   Multidisciplinary Problems     Physical Therapy Goals        Problem: Physical Therapy Goal    Goal Priority Disciplines Outcome Goal Variances Interventions   Physical Therapy Goal     PT, PT/OT Ongoing, Progressing     Description:  Goals to be met by: 19    Patient will increase functional independence with mobility by performin. Sit to stand transfer with Stand-by Assistance - met 10/28  2. Gait  x 250 feet with Stand-by Assistance using LRAD. - met 10/28  3. Ascend/Descend 6 inch curb step with Stand-by Assistance.- Not met but per assessment of functional mobility and ambulation, patient could ascend/descend 6 in. Curb without difficulty.  4 pt receive gait training ~ 250 ft modified Independent using rolling IV pole for balance if needed. - MET 10/28                         Jessi Carranza, SPT  10/30/2019

## 2019-10-31 LAB
ALBUMIN SERPL BCP-MCNC: 2.6 G/DL (ref 3.5–5.2)
ALP SERPL-CCNC: 63 U/L (ref 55–135)
ALT SERPL W/O P-5'-P-CCNC: 18 U/L (ref 10–44)
ANION GAP SERPL CALC-SCNC: 14 MMOL/L (ref 8–16)
AST SERPL-CCNC: 19 U/L (ref 10–40)
BASOPHILS # BLD AUTO: 0.06 K/UL (ref 0–0.2)
BASOPHILS NFR BLD: 0.6 % (ref 0–1.9)
BILIRUB SERPL-MCNC: 0.4 MG/DL (ref 0.1–1)
BUN SERPL-MCNC: 7 MG/DL (ref 8–23)
CALCIUM SERPL-MCNC: 9.1 MG/DL (ref 8.7–10.5)
CHLORIDE SERPL-SCNC: 100 MMOL/L (ref 95–110)
CO2 SERPL-SCNC: 24 MMOL/L (ref 23–29)
CREAT SERPL-MCNC: 0.7 MG/DL (ref 0.5–1.4)
CRP SERPL-MCNC: 164.2 MG/L (ref 0–3.19)
DIFFERENTIAL METHOD: ABNORMAL
EOSINOPHIL # BLD AUTO: 0.2 K/UL (ref 0–0.5)
EOSINOPHIL NFR BLD: 2.1 % (ref 0–8)
ERYTHROCYTE [DISTWIDTH] IN BLOOD BY AUTOMATED COUNT: 13.2 % (ref 11.5–14.5)
EST. GFR  (AFRICAN AMERICAN): >60 ML/MIN/1.73 M^2
EST. GFR  (NON AFRICAN AMERICAN): >60 ML/MIN/1.73 M^2
GLUCOSE SERPL-MCNC: 87 MG/DL (ref 70–110)
HCT VFR BLD AUTO: 35.2 % (ref 40–54)
HGB BLD-MCNC: 11.5 G/DL (ref 14–18)
IMM GRANULOCYTES # BLD AUTO: 0.17 K/UL (ref 0–0.04)
IMM GRANULOCYTES NFR BLD AUTO: 1.6 % (ref 0–0.5)
LYMPHOCYTES # BLD AUTO: 0.8 K/UL (ref 1–4.8)
LYMPHOCYTES NFR BLD: 7.5 % (ref 18–48)
MAGNESIUM SERPL-MCNC: 1.8 MG/DL (ref 1.6–2.6)
MCH RBC QN AUTO: 30.6 PG (ref 27–31)
MCHC RBC AUTO-ENTMCNC: 32.7 G/DL (ref 32–36)
MCV RBC AUTO: 94 FL (ref 82–98)
MONOCYTES # BLD AUTO: 1.3 K/UL (ref 0.3–1)
MONOCYTES NFR BLD: 12.9 % (ref 4–15)
NEUTROPHILS # BLD AUTO: 7.8 K/UL (ref 1.8–7.7)
NEUTROPHILS NFR BLD: 75.3 % (ref 38–73)
NRBC BLD-RTO: 0 /100 WBC
PLATELET # BLD AUTO: 356 K/UL (ref 150–350)
PMV BLD AUTO: 9.2 FL (ref 9.2–12.9)
POTASSIUM SERPL-SCNC: 4.3 MMOL/L (ref 3.5–5.1)
PROT SERPL-MCNC: 6.5 G/DL (ref 6–8.4)
RBC # BLD AUTO: 3.76 M/UL (ref 4.6–6.2)
SODIUM SERPL-SCNC: 138 MMOL/L (ref 136–145)
WBC # BLD AUTO: 10.4 K/UL (ref 3.9–12.7)

## 2019-10-31 PROCEDURE — 94664 DEMO&/EVAL PT USE INHALER: CPT

## 2019-10-31 PROCEDURE — 63600175 PHARM REV CODE 636 W HCPCS: Performed by: NURSE PRACTITIONER

## 2019-10-31 PROCEDURE — 80053 COMPREHEN METABOLIC PANEL: CPT

## 2019-10-31 PROCEDURE — 85025 COMPLETE CBC W/AUTO DIFF WBC: CPT

## 2019-10-31 PROCEDURE — 25000003 PHARM REV CODE 250: Performed by: STUDENT IN AN ORGANIZED HEALTH CARE EDUCATION/TRAINING PROGRAM

## 2019-10-31 PROCEDURE — 94761 N-INVAS EAR/PLS OXIMETRY MLT: CPT

## 2019-10-31 PROCEDURE — 83735 ASSAY OF MAGNESIUM: CPT

## 2019-10-31 PROCEDURE — 94770 HC EXHALED C02 TEST: CPT

## 2019-10-31 PROCEDURE — 25000003 PHARM REV CODE 250: Performed by: NURSE PRACTITIONER

## 2019-10-31 PROCEDURE — 99900035 HC TECH TIME PER 15 MIN (STAT)

## 2019-10-31 PROCEDURE — 36415 COLL VENOUS BLD VENIPUNCTURE: CPT

## 2019-10-31 PROCEDURE — 25000242 PHARM REV CODE 250 ALT 637 W/ HCPCS: Performed by: STUDENT IN AN ORGANIZED HEALTH CARE EDUCATION/TRAINING PROGRAM

## 2019-10-31 PROCEDURE — 20600001 HC STEP DOWN PRIVATE ROOM

## 2019-10-31 PROCEDURE — 86141 C-REACTIVE PROTEIN HS: CPT

## 2019-10-31 PROCEDURE — 94640 AIRWAY INHALATION TREATMENT: CPT

## 2019-10-31 RX ORDER — BISACODYL 10 MG
10 SUPPOSITORY, RECTAL RECTAL ONCE
Status: COMPLETED | OUTPATIENT
Start: 2019-10-31 | End: 2019-10-31

## 2019-10-31 RX ORDER — LANOLIN ALCOHOL/MO/W.PET/CERES
400 CREAM (GRAM) TOPICAL ONCE
Status: COMPLETED | OUTPATIENT
Start: 2019-10-31 | End: 2019-10-31

## 2019-10-31 RX ADMIN — LEVALBUTEROL HYDROCHLORIDE 0.63 MG: 0.63 SOLUTION RESPIRATORY (INHALATION) at 08:10

## 2019-10-31 RX ADMIN — OXYCODONE HYDROCHLORIDE AND ACETAMINOPHEN 1 TABLET: 10; 325 TABLET ORAL at 08:10

## 2019-10-31 RX ADMIN — FAMOTIDINE 20 MG: 20 TABLET ORAL at 08:10

## 2019-10-31 RX ADMIN — DOCUSATE SODIUM 50 MG: 50 CAPSULE, LIQUID FILLED ORAL at 08:10

## 2019-10-31 RX ADMIN — LEVALBUTEROL HYDROCHLORIDE 0.63 MG: 0.63 SOLUTION RESPIRATORY (INHALATION) at 01:10

## 2019-10-31 RX ADMIN — OXYCODONE HYDROCHLORIDE AND ACETAMINOPHEN 1 TABLET: 10; 325 TABLET ORAL at 03:10

## 2019-10-31 RX ADMIN — BISACODYL 10 MG: 10 SUPPOSITORY RECTAL at 06:10

## 2019-10-31 RX ADMIN — OXYCODONE HYDROCHLORIDE AND ACETAMINOPHEN 1 TABLET: 10; 325 TABLET ORAL at 12:10

## 2019-10-31 RX ADMIN — OXYCODONE HYDROCHLORIDE AND ACETAMINOPHEN 1 TABLET: 10; 325 TABLET ORAL at 07:10

## 2019-10-31 RX ADMIN — ENOXAPARIN SODIUM 40 MG: 100 INJECTION SUBCUTANEOUS at 04:10

## 2019-10-31 RX ADMIN — Medication 400 MG: at 08:10

## 2019-10-31 RX ADMIN — SIMVASTATIN 40 MG: 20 TABLET, FILM COATED ORAL at 08:10

## 2019-10-31 RX ADMIN — OXYCODONE HYDROCHLORIDE AND ACETAMINOPHEN 1 TABLET: 10; 325 TABLET ORAL at 04:10

## 2019-10-31 NOTE — PLAN OF CARE
AAO x 4. ML with DB BINDU. RLQ BJ drain. Ambulates to BR with stand by assistance. Tolerating regular diet, no complaints of N/V overnight. VSS on RA. Complained of pain overnight, received PRN meds. Pt remained free of falls overnight. POC reviewed with pt who verbalized understanding. Bed in low position, call light in reach. No signs of distress or concerns noted at this time. WCTM.

## 2019-10-31 NOTE — NURSING
Patient set up in chair all day, dressing changed around J-P drain twice today, Medicated for pain as requested and ordered. Wife remains at bedside.

## 2019-10-31 NOTE — PROGRESS NOTES
[facility]  General Surgery  Progress Note    Subjective:     History of Present Illness:  No notes on file    Post-Op Info:  Procedure(s) (LRB):  WHIPPLE PROCEDURE (N/A)   6 Days Post-Op     Interval History:   No acute events overnight. Tolerated a regular diet without any trouble. VSS. No nausea or vomit. Adequate pain control with oral meds.  HR: 109-123  Passing gas, no bowel movements yet.   Leaking from BJ drain, dressing was changed overnight.     Medications:  Continuous Infusions:    Scheduled Meds:   docusate sodium  50 mg Oral Daily    enoxaparin  40 mg Subcutaneous Daily    famotidine  20 mg Oral BID    levalbuterol  0.63 mg Nebulization Q6H WAKE    simvastatin  40 mg Oral QHS     PRN Meds:diphenhydrAMINE, influenza, naloxone, ondansetron, oxyCODONE-acetaminophen, oxyCODONE-acetaminophen, sodium chloride 0.9%     Review of patient's allergies indicates:   Allergen Reactions    Codeine Other (See Comments)     headache    Aspirin Rash    Penicillins Rash     Objective:     Vital Signs (Most Recent):  Temp: 96.9 °F (36.1 °C) (10/31/19 0346)  Pulse: 109 (10/31/19 0346)  Resp: 16 (10/31/19 0346)  BP: (!) 148/89 (10/31/19 0346)  SpO2: 95 % (10/31/19 0346) Vital Signs (24h Range):  Temp:  [96.2 °F (35.7 °C)-99.3 °F (37.4 °C)] 96.9 °F (36.1 °C)  Pulse:  [109-123] 109  Resp:  [14-20] 16  SpO2:  [92 %-96 %] 95 %  BP: (124-161)/(75-94) 148/89     Weight: 65.3 kg (143 lb 15.4 oz)  Body mass index is 23.96 kg/m².    Intake/Output - Last 3 Shifts       10/29 0700 - 10/30 0659 10/30 0700 - 10/31 0659 10/31 0700 - 11/01 0659    P.O.  957     I.V. (mL/kg) 738.8 (11.3) 861.3 (13.2)     Total Intake(mL/kg) 738.8 (11.3) 1818.3 (27.8)     Urine (mL/kg/hr)  0 (0)     Emesis/NG output       Drains  50     Stool  0     Total Output  50     Net +738.8 +1768.3            Urine Occurrence 2 x 6 x     Stool Occurrence 0 x 0 x           Physical Exam   Constitutional: He appears well-developed and  well-nourished.   HENT:   Head: Normocephalic.   Eyes: Pupils are equal, round, and reactive to light. Conjunctivae and EOM are normal.   Neck: Normal range of motion. Neck supple.   Cardiovascular: Normal rate and regular rhythm.   Pulmonary/Chest: Effort normal.   Abdominal: Soft.   Incions c/d/i covered with dermabond     Musculoskeletal: Normal range of motion.   Neurological: He is alert.   Skin: Skin is warm. Capillary refill takes less than 2 seconds.       Significant Labs:  CBC:   Recent Labs   Lab 10/31/19  0406   WBC 10.40   RBC 3.76*   HGB 11.5*   HCT 35.2*   *   MCV 94   MCH 30.6   MCHC 32.7     BMP:   Recent Labs   Lab 10/31/19  0406   GLU 87      K 4.3      CO2 24   BUN 7*   CREATININE 0.7   CALCIUM 9.1   MG 1.8       Significant Diagnostics:  I have reviewed and interpreted all pertinent imaging results/findings within the past 24 hours.    Assessment/Plan:     * Pancreas cyst  59 y.o male PMH of IPMN in the uncinate that wass been followed appropriately for years, with recent increase in size as well as new PD dilation to 1.3cm. POD 6 of Whipple procedure.     - Regular diet  - Pathway    - PT/OT  - Encourage IS  - OOBTC x 2  - Ambulate x3  - Pulmonary toilet.   - OK to Shower  - Lovenox, SCDs.   - Colace   - Replace lytes PRN     Dispo: Getting closer, Friday/saturday.         Humphrey Ramos MD  General Surgery  [facility]

## 2019-10-31 NOTE — PROGRESS NOTES
Moderate amount of serosanguineous leakage around BJ drain site that completely saturated guaze. Was not leaking at all before tonight. MD on call notified and aware.

## 2019-10-31 NOTE — ASSESSMENT & PLAN NOTE
59 y.o male PMH of IPMN in the uncinate that wass been followed appropriately for years, with recent increase in size as well as new PD dilation to 1.3cm. POD 6 of Whipple procedure.     - Regular diet  - Pathway    - PT/OT  - Encourage IS  - OOBTC x 2  - Ambulate x3  - Pulmonary toilet.   - OK to Shower  - Lovenox, SCDs.   - Colace   - Replace lytes PRN     Dispo: Getting closer, Friday/saturday.

## 2019-10-31 NOTE — SUBJECTIVE & OBJECTIVE
Interval History:   No acute events overnight. Tolerated a regular diet without any trouble. VSS. No nausea or vomit. Adequate pain control with oral meds.  HR: 109-123  Passing gas, no bowel movements yet.   Leaking from BJ drain, dressing was changed overnight.     Medications:  Continuous Infusions:    Scheduled Meds:   docusate sodium  50 mg Oral Daily    enoxaparin  40 mg Subcutaneous Daily    famotidine  20 mg Oral BID    levalbuterol  0.63 mg Nebulization Q6H WAKE    simvastatin  40 mg Oral QHS     PRN Meds:diphenhydrAMINE, influenza, naloxone, ondansetron, oxyCODONE-acetaminophen, oxyCODONE-acetaminophen, sodium chloride 0.9%     Review of patient's allergies indicates:   Allergen Reactions    Codeine Other (See Comments)     headache    Aspirin Rash    Penicillins Rash     Objective:     Vital Signs (Most Recent):  Temp: 96.9 °F (36.1 °C) (10/31/19 0346)  Pulse: 109 (10/31/19 0346)  Resp: 16 (10/31/19 0346)  BP: (!) 148/89 (10/31/19 0346)  SpO2: 95 % (10/31/19 0346) Vital Signs (24h Range):  Temp:  [96.2 °F (35.7 °C)-99.3 °F (37.4 °C)] 96.9 °F (36.1 °C)  Pulse:  [109-123] 109  Resp:  [14-20] 16  SpO2:  [92 %-96 %] 95 %  BP: (124-161)/(75-94) 148/89     Weight: 65.3 kg (143 lb 15.4 oz)  Body mass index is 23.96 kg/m².    Intake/Output - Last 3 Shifts       10/29 0700 - 10/30 0659 10/30 0700 - 10/31 0659 10/31 0700 - 11/01 0659    P.O.  957     I.V. (mL/kg) 738.8 (11.3) 861.3 (13.2)     Total Intake(mL/kg) 738.8 (11.3) 1818.3 (27.8)     Urine (mL/kg/hr)  0 (0)     Emesis/NG output       Drains  50     Stool  0     Total Output  50     Net +738.8 +1768.3            Urine Occurrence 2 x 6 x     Stool Occurrence 0 x 0 x           Physical Exam   Constitutional: He appears well-developed and well-nourished.   HENT:   Head: Normocephalic.   Eyes: Pupils are equal, round, and reactive to light. Conjunctivae and EOM are normal.   Neck: Normal range of motion. Neck supple.   Cardiovascular: Normal rate and  regular rhythm.   Pulmonary/Chest: Effort normal.   Abdominal: Soft.   Incions c/d/i covered with dermabond     Musculoskeletal: Normal range of motion.   Neurological: He is alert.   Skin: Skin is warm. Capillary refill takes less than 2 seconds.       Significant Labs:  CBC:   Recent Labs   Lab 10/31/19  0406   WBC 10.40   RBC 3.76*   HGB 11.5*   HCT 35.2*   *   MCV 94   MCH 30.6   MCHC 32.7     BMP:   Recent Labs   Lab 10/31/19  0406   GLU 87      K 4.3      CO2 24   BUN 7*   CREATININE 0.7   CALCIUM 9.1   MG 1.8       Significant Diagnostics:  I have reviewed and interpreted all pertinent imaging results/findings within the past 24 hours.

## 2019-11-01 LAB
ALBUMIN SERPL BCP-MCNC: 2.5 G/DL (ref 3.5–5.2)
ALP SERPL-CCNC: 59 U/L (ref 55–135)
ALT SERPL W/O P-5'-P-CCNC: 15 U/L (ref 10–44)
AMYLASE, BODY FLUID: 6045 U/L
ANION GAP SERPL CALC-SCNC: 8 MMOL/L (ref 8–16)
AST SERPL-CCNC: 17 U/L (ref 10–40)
BASOPHILS # BLD AUTO: 0.07 K/UL (ref 0–0.2)
BASOPHILS NFR BLD: 0.8 % (ref 0–1.9)
BILIRUB SERPL-MCNC: 0.3 MG/DL (ref 0.1–1)
BODY FLUID SOURCE AMYLASE: NORMAL
BUN SERPL-MCNC: 10 MG/DL (ref 8–23)
CALCIUM SERPL-MCNC: 8.9 MG/DL (ref 8.7–10.5)
CHLORIDE SERPL-SCNC: 101 MMOL/L (ref 95–110)
CO2 SERPL-SCNC: 29 MMOL/L (ref 23–29)
CREAT SERPL-MCNC: 0.7 MG/DL (ref 0.5–1.4)
DIFFERENTIAL METHOD: ABNORMAL
EOSINOPHIL # BLD AUTO: 0.3 K/UL (ref 0–0.5)
EOSINOPHIL NFR BLD: 2.8 % (ref 0–8)
ERYTHROCYTE [DISTWIDTH] IN BLOOD BY AUTOMATED COUNT: 13.2 % (ref 11.5–14.5)
EST. GFR  (AFRICAN AMERICAN): >60 ML/MIN/1.73 M^2
EST. GFR  (NON AFRICAN AMERICAN): >60 ML/MIN/1.73 M^2
GLUCOSE SERPL-MCNC: 101 MG/DL (ref 70–110)
HCT VFR BLD AUTO: 33.9 % (ref 40–54)
HGB BLD-MCNC: 11.1 G/DL (ref 14–18)
IMM GRANULOCYTES # BLD AUTO: 0.21 K/UL (ref 0–0.04)
IMM GRANULOCYTES NFR BLD AUTO: 2.3 % (ref 0–0.5)
LYMPHOCYTES # BLD AUTO: 0.9 K/UL (ref 1–4.8)
LYMPHOCYTES NFR BLD: 10.2 % (ref 18–48)
MAGNESIUM SERPL-MCNC: 1.9 MG/DL (ref 1.6–2.6)
MCH RBC QN AUTO: 30.4 PG (ref 27–31)
MCHC RBC AUTO-ENTMCNC: 32.7 G/DL (ref 32–36)
MCV RBC AUTO: 93 FL (ref 82–98)
MONOCYTES # BLD AUTO: 1.3 K/UL (ref 0.3–1)
MONOCYTES NFR BLD: 13.8 % (ref 4–15)
NEUTROPHILS # BLD AUTO: 6.4 K/UL (ref 1.8–7.7)
NEUTROPHILS NFR BLD: 70.1 % (ref 38–73)
NRBC BLD-RTO: 0 /100 WBC
PLATELET # BLD AUTO: 345 K/UL (ref 150–350)
PMV BLD AUTO: 8.8 FL (ref 9.2–12.9)
POTASSIUM SERPL-SCNC: 4.3 MMOL/L (ref 3.5–5.1)
PROT SERPL-MCNC: 6.1 G/DL (ref 6–8.4)
RBC # BLD AUTO: 3.65 M/UL (ref 4.6–6.2)
SODIUM SERPL-SCNC: 138 MMOL/L (ref 136–145)
WBC # BLD AUTO: 9.08 K/UL (ref 3.9–12.7)

## 2019-11-01 PROCEDURE — 20600001 HC STEP DOWN PRIVATE ROOM

## 2019-11-01 PROCEDURE — 83735 ASSAY OF MAGNESIUM: CPT

## 2019-11-01 PROCEDURE — 80053 COMPREHEN METABOLIC PANEL: CPT

## 2019-11-01 PROCEDURE — 25000003 PHARM REV CODE 250: Performed by: STUDENT IN AN ORGANIZED HEALTH CARE EDUCATION/TRAINING PROGRAM

## 2019-11-01 PROCEDURE — 99900035 HC TECH TIME PER 15 MIN (STAT)

## 2019-11-01 PROCEDURE — 82150 ASSAY OF AMYLASE: CPT

## 2019-11-01 PROCEDURE — 63600175 PHARM REV CODE 636 W HCPCS: Performed by: NURSE PRACTITIONER

## 2019-11-01 PROCEDURE — 36415 COLL VENOUS BLD VENIPUNCTURE: CPT

## 2019-11-01 PROCEDURE — 25000242 PHARM REV CODE 250 ALT 637 W/ HCPCS: Performed by: STUDENT IN AN ORGANIZED HEALTH CARE EDUCATION/TRAINING PROGRAM

## 2019-11-01 PROCEDURE — 94761 N-INVAS EAR/PLS OXIMETRY MLT: CPT

## 2019-11-01 PROCEDURE — 94664 DEMO&/EVAL PT USE INHALER: CPT

## 2019-11-01 PROCEDURE — 94640 AIRWAY INHALATION TREATMENT: CPT

## 2019-11-01 PROCEDURE — 85025 COMPLETE CBC W/AUTO DIFF WBC: CPT

## 2019-11-01 PROCEDURE — 25000003 PHARM REV CODE 250: Performed by: NURSE PRACTITIONER

## 2019-11-01 RX ORDER — OXYCODONE AND ACETAMINOPHEN 5; 325 MG/1; MG/1
1 TABLET ORAL EVERY 4 HOURS PRN
Qty: 25 TABLET | Refills: 0 | Status: ON HOLD | OUTPATIENT
Start: 2019-11-01 | End: 2019-11-08 | Stop reason: HOSPADM

## 2019-11-01 RX ADMIN — SIMVASTATIN 40 MG: 20 TABLET, FILM COATED ORAL at 08:11

## 2019-11-01 RX ADMIN — FAMOTIDINE 20 MG: 20 TABLET ORAL at 08:11

## 2019-11-01 RX ADMIN — OXYCODONE HYDROCHLORIDE AND ACETAMINOPHEN 1 TABLET: 10; 325 TABLET ORAL at 05:11

## 2019-11-01 RX ADMIN — ENOXAPARIN SODIUM 40 MG: 100 INJECTION SUBCUTANEOUS at 05:11

## 2019-11-01 RX ADMIN — OXYCODONE HYDROCHLORIDE AND ACETAMINOPHEN 1 TABLET: 10; 325 TABLET ORAL at 11:11

## 2019-11-01 RX ADMIN — LEVALBUTEROL HYDROCHLORIDE 0.63 MG: 0.63 SOLUTION RESPIRATORY (INHALATION) at 01:11

## 2019-11-01 RX ADMIN — OXYCODONE HYDROCHLORIDE AND ACETAMINOPHEN 1 TABLET: 10; 325 TABLET ORAL at 01:11

## 2019-11-01 RX ADMIN — LEVALBUTEROL HYDROCHLORIDE 0.63 MG: 0.63 SOLUTION RESPIRATORY (INHALATION) at 07:11

## 2019-11-01 RX ADMIN — OXYCODONE HYDROCHLORIDE AND ACETAMINOPHEN 1 TABLET: 10; 325 TABLET ORAL at 09:11

## 2019-11-01 RX ADMIN — FAMOTIDINE 20 MG: 20 TABLET ORAL at 09:11

## 2019-11-01 RX ADMIN — DOCUSATE SODIUM 50 MG: 50 CAPSULE, LIQUID FILLED ORAL at 09:11

## 2019-11-01 RX ADMIN — LEVALBUTEROL HYDROCHLORIDE 0.63 MG: 0.63 SOLUTION RESPIRATORY (INHALATION) at 08:11

## 2019-11-01 NOTE — PROGRESS NOTES
[facility]  General Surgery  Progress Note    Subjective:     History of Present Illness:  No notes on file    Post-Op Info:  Procedure(s) (LRB):  WHIPPLE PROCEDURE (N/A)   7 Days Post-Op     Interval History:   No acute events overnight. Tolerated a regular diet without any trouble.  No nausea or vomit. Adequate pain control with oral meds.  Still tachycardic HR:   Passing gas, positive bowel movents after suppository  Leaking from BJ drain, dressing was changed overnight.     Medications:  Continuous Infusions:    Scheduled Meds:   docusate sodium  50 mg Oral Daily    enoxaparin  40 mg Subcutaneous Daily    famotidine  20 mg Oral BID    levalbuterol  0.63 mg Nebulization Q6H WAKE    simvastatin  40 mg Oral QHS     PRN Meds:diphenhydrAMINE, influenza, naloxone, ondansetron, oxyCODONE-acetaminophen, oxyCODONE-acetaminophen, sodium chloride 0.9%     Review of patient's allergies indicates:   Allergen Reactions    Codeine Other (See Comments)     headache    Aspirin Rash    Penicillins Rash     Objective:     Vital Signs (Most Recent):  Temp: 97.9 °F (36.6 °C) (11/01/19 0348)  Pulse: 86 (11/01/19 0755)  Resp: 16 (11/01/19 0755)  BP: 110/68 (11/01/19 0348)  SpO2: 95 % (11/01/19 0755) Vital Signs (24h Range):  Temp:  [96.3 °F (35.7 °C)-98.5 °F (36.9 °C)] 97.9 °F (36.6 °C)  Pulse:  [] 86  Resp:  [16-18] 16  SpO2:  [92 %-98 %] 95 %  BP: (110-125)/(68-82) 110/68     Weight: 65.3 kg (143 lb 15.4 oz)  Body mass index is 23.96 kg/m².    Intake/Output - Last 3 Shifts       10/30 0700 - 10/31 0659 10/31 0700 - 11/01 0659 11/01 0700 - 11/02 0659    P.O. 957 960     I.V. (mL/kg) 861.3 (13.2)      Total Intake(mL/kg) 1818.3 (27.8) 960 (14.7)     Urine (mL/kg/hr) 0 (0) 0 (0)     Drains 50 30     Stool 0 0     Total Output 50 30     Net +1768.3 +930            Urine Occurrence 6 x 6 x     Stool Occurrence 0 x 2 x           Physical Exam   Constitutional: He appears well-developed and  well-nourished.   HENT:   Head: Normocephalic.   Eyes: Pupils are equal, round, and reactive to light. Conjunctivae and EOM are normal.   Neck: Normal range of motion. Neck supple.   Cardiovascular: Normal rate and regular rhythm.   Pulmonary/Chest: Effort normal.   Abdominal: Soft.   Incions c/d/i covered with dermabond     Musculoskeletal: Normal range of motion.   Neurological: He is alert.   Skin: Skin is warm. Capillary refill takes less than 2 seconds.       Significant Labs:  CBC:   Recent Labs   Lab 11/01/19  0338   WBC 9.08   RBC 3.65*   HGB 11.1*   HCT 33.9*      MCV 93   MCH 30.4   MCHC 32.7     BMP:   Recent Labs   Lab 11/01/19  0338         K 4.3      CO2 29   BUN 10   CREATININE 0.7   CALCIUM 8.9   MG 1.9       Significant Diagnostics:  I have reviewed and interpreted all pertinent imaging results/findings within the past 24 hours.    Assessment/Plan:     * Pancreas cyst  59 y.o male PMH of IPMN in the uncinate that wass been followed appropriately for years, with recent increase in size as well as new PD dilation to 1.3cm. POD 7 of Whipple procedure.     - Regular diet  - Pathway    - PT/OT  - Encourage IS  - OOBTC x 2  - Ambulate x3  - Pulmonary toilet.   - OK to Shower  - Lovenox, SCDs.   - Colace   - Replace lytes PRN     Dispo: Getting closer, probably tomorrow        Humphrey Ramos MD  General Surgery  [facility]

## 2019-11-01 NOTE — HOSPITAL COURSE
Pt underwent a Whipple due to a IPMN in 10/25/19. See operative note for procedure details. Tolerated procedure well. Post-operative course was uncomplicated. Patient follow the pathway without any trouble. All post-operative milestones were met without delay. He was tolerating diet, ambulating, voiding spontaneously, and his pain was well controlled. Patient was instructed and educated on discharge instructions, follow up appointment with surgical oncology in one week.

## 2019-11-01 NOTE — PLAN OF CARE
POC reviewed with patient and wife at bedside with both verbalizing understanding. AAOx4. VSS on RA. No c/o nausea. C/o pain resolved with prn pain meds.  BJ drain has small amount of leakage. BJ output is small amount.Pt. Up ad dipika to urinate. Pt. Had 2 small, formed quarter-sized bowel movements. Bed low and locked. No falls or acute events. Call light within reach. WCTM.

## 2019-11-01 NOTE — ASSESSMENT & PLAN NOTE
59 y.o male PMH of IPMN in the uncinate that wass been followed appropriately for years, with recent increase in size as well as new PD dilation to 1.3cm. POD 7 of Whipple procedure.     - Regular diet  - Pathway    - PT/OT  - Encourage IS  - OOBTC x 2  - Ambulate x3  - Pulmonary toilet.   - OK to Shower  - Lovenox, SCDs.   - Colace   - Replace lytes PRN     Dispo: Getting closer, probably tomorrow

## 2019-11-01 NOTE — PLAN OF CARE
POC reviewed with patient and family. Verbalizes understanding.Ambulating in the room with no difficulties, Prn med's for pain,J.P. continues to leak at insertion site. Tolerating regular diet with out complaints.VSS.ARMAND

## 2019-11-01 NOTE — SUBJECTIVE & OBJECTIVE
Interval History:   No acute events overnight. Tolerated a regular diet without any trouble.  No nausea or vomit. Adequate pain control with oral meds.  Still tachycardic HR:   Passing gas, positive bowel movents after suppository  Leaking from BJ drain, dressing was changed overnight.     Medications:  Continuous Infusions:    Scheduled Meds:   docusate sodium  50 mg Oral Daily    enoxaparin  40 mg Subcutaneous Daily    famotidine  20 mg Oral BID    levalbuterol  0.63 mg Nebulization Q6H WAKE    simvastatin  40 mg Oral QHS     PRN Meds:diphenhydrAMINE, influenza, naloxone, ondansetron, oxyCODONE-acetaminophen, oxyCODONE-acetaminophen, sodium chloride 0.9%     Review of patient's allergies indicates:   Allergen Reactions    Codeine Other (See Comments)     headache    Aspirin Rash    Penicillins Rash     Objective:     Vital Signs (Most Recent):  Temp: 97.9 °F (36.6 °C) (11/01/19 0348)  Pulse: 86 (11/01/19 0755)  Resp: 16 (11/01/19 0755)  BP: 110/68 (11/01/19 0348)  SpO2: 95 % (11/01/19 0755) Vital Signs (24h Range):  Temp:  [96.3 °F (35.7 °C)-98.5 °F (36.9 °C)] 97.9 °F (36.6 °C)  Pulse:  [] 86  Resp:  [16-18] 16  SpO2:  [92 %-98 %] 95 %  BP: (110-125)/(68-82) 110/68     Weight: 65.3 kg (143 lb 15.4 oz)  Body mass index is 23.96 kg/m².    Intake/Output - Last 3 Shifts       10/30 0700 - 10/31 0659 10/31 0700 - 11/01 0659 11/01 0700 - 11/02 0659    P.O. 957 960     I.V. (mL/kg) 861.3 (13.2)      Total Intake(mL/kg) 1818.3 (27.8) 960 (14.7)     Urine (mL/kg/hr) 0 (0) 0 (0)     Drains 50 30     Stool 0 0     Total Output 50 30     Net +1768.3 +930            Urine Occurrence 6 x 6 x     Stool Occurrence 0 x 2 x           Physical Exam   Constitutional: He appears well-developed and well-nourished.   HENT:   Head: Normocephalic.   Eyes: Pupils are equal, round, and reactive to light. Conjunctivae and EOM are normal.   Neck: Normal range of motion. Neck supple.   Cardiovascular: Normal rate and  regular rhythm.   Pulmonary/Chest: Effort normal.   Abdominal: Soft.   Incions c/d/i covered with dermabond     Musculoskeletal: Normal range of motion.   Neurological: He is alert.   Skin: Skin is warm. Capillary refill takes less than 2 seconds.       Significant Labs:  CBC:   Recent Labs   Lab 11/01/19  0338   WBC 9.08   RBC 3.65*   HGB 11.1*   HCT 33.9*      MCV 93   MCH 30.4   MCHC 32.7     BMP:   Recent Labs   Lab 11/01/19  0338         K 4.3      CO2 29   BUN 10   CREATININE 0.7   CALCIUM 8.9   MG 1.9       Significant Diagnostics:  I have reviewed and interpreted all pertinent imaging results/findings within the past 24 hours.

## 2019-11-01 NOTE — HPI
This is a 59 y.o. male here for evaluation of pancreas cyst and abdominal pain. The patient is carrying a diagnosis of side branched IPMN since 2013 with multiple EUS's with FNA (CEA >200). FNA did not demonstrated evidence of malignancy. He was follow with the combination of EUS's and imaging which demonstrated evidence of stability to decreased size of the pancreas cyst. The cyst is located in the body of the pancreas and measured <3 cm. There is evidence of progressive dilation of the PD up to 8 mm. In 2017 he underwent pancreatoscopy for biopsies. The patient developed pancreatitis and since then he stated intermittent episodes of abdominal pain. He have two EUS's with celiac plexus block which improved his symptoms. He stated he is very functional except with the episodes of pain for what he used acetaminophen. He doesn't smoke or drink alcohol. Denied family history of pancreatic cancer. He is S/P cholecystectomy. He was just discharge form the hospital with acute pancreatitis (2 weeks ago). This is his second episodes. Today he complaint of diffuse abdominal pain with nausea and diarrhea. Had a EUS today and was sent to surgical oncology clinic due to findings. He states that the abdominal pain increases when he had a large meal or eats sugar meals.

## 2019-11-02 VITALS
HEART RATE: 110 BPM | SYSTOLIC BLOOD PRESSURE: 123 MMHG | WEIGHT: 143.94 LBS | OXYGEN SATURATION: 95 % | HEIGHT: 65 IN | DIASTOLIC BLOOD PRESSURE: 70 MMHG | TEMPERATURE: 97 F | BODY MASS INDEX: 23.98 KG/M2 | RESPIRATION RATE: 16 BRPM

## 2019-11-02 LAB
ALBUMIN SERPL BCP-MCNC: 2.7 G/DL (ref 3.5–5.2)
ALP SERPL-CCNC: 69 U/L (ref 55–135)
ALT SERPL W/O P-5'-P-CCNC: 22 U/L (ref 10–44)
ANION GAP SERPL CALC-SCNC: 10 MMOL/L (ref 8–16)
AST SERPL-CCNC: 30 U/L (ref 10–40)
BASOPHILS # BLD AUTO: 0.07 K/UL (ref 0–0.2)
BASOPHILS NFR BLD: 0.7 % (ref 0–1.9)
BILIRUB SERPL-MCNC: 0.3 MG/DL (ref 0.1–1)
BUN SERPL-MCNC: 13 MG/DL (ref 8–23)
CALCIUM SERPL-MCNC: 9.2 MG/DL (ref 8.7–10.5)
CHLORIDE SERPL-SCNC: 101 MMOL/L (ref 95–110)
CO2 SERPL-SCNC: 27 MMOL/L (ref 23–29)
CREAT SERPL-MCNC: 0.7 MG/DL (ref 0.5–1.4)
DIFFERENTIAL METHOD: ABNORMAL
EOSINOPHIL # BLD AUTO: 0.3 K/UL (ref 0–0.5)
EOSINOPHIL NFR BLD: 2.7 % (ref 0–8)
ERYTHROCYTE [DISTWIDTH] IN BLOOD BY AUTOMATED COUNT: 13.2 % (ref 11.5–14.5)
EST. GFR  (AFRICAN AMERICAN): >60 ML/MIN/1.73 M^2
EST. GFR  (NON AFRICAN AMERICAN): >60 ML/MIN/1.73 M^2
GLUCOSE SERPL-MCNC: 102 MG/DL (ref 70–110)
HCT VFR BLD AUTO: 37.2 % (ref 40–54)
HGB BLD-MCNC: 12.4 G/DL (ref 14–18)
IMM GRANULOCYTES # BLD AUTO: 0.29 K/UL (ref 0–0.04)
IMM GRANULOCYTES NFR BLD AUTO: 2.8 % (ref 0–0.5)
LYMPHOCYTES # BLD AUTO: 1 K/UL (ref 1–4.8)
LYMPHOCYTES NFR BLD: 9.3 % (ref 18–48)
MAGNESIUM SERPL-MCNC: 2.2 MG/DL (ref 1.6–2.6)
MCH RBC QN AUTO: 30.9 PG (ref 27–31)
MCHC RBC AUTO-ENTMCNC: 33.3 G/DL (ref 32–36)
MCV RBC AUTO: 93 FL (ref 82–98)
MONOCYTES # BLD AUTO: 1.1 K/UL (ref 0.3–1)
MONOCYTES NFR BLD: 10.7 % (ref 4–15)
NEUTROPHILS # BLD AUTO: 7.6 K/UL (ref 1.8–7.7)
NEUTROPHILS NFR BLD: 73.8 % (ref 38–73)
NRBC BLD-RTO: 0 /100 WBC
PLATELET # BLD AUTO: 462 K/UL (ref 150–350)
PMV BLD AUTO: 8.7 FL (ref 9.2–12.9)
POTASSIUM SERPL-SCNC: 4.5 MMOL/L (ref 3.5–5.1)
PROT SERPL-MCNC: 6.4 G/DL (ref 6–8.4)
RBC # BLD AUTO: 4.01 M/UL (ref 4.6–6.2)
SODIUM SERPL-SCNC: 138 MMOL/L (ref 136–145)
WBC # BLD AUTO: 10.29 K/UL (ref 3.9–12.7)

## 2019-11-02 PROCEDURE — 25000242 PHARM REV CODE 250 ALT 637 W/ HCPCS: Performed by: STUDENT IN AN ORGANIZED HEALTH CARE EDUCATION/TRAINING PROGRAM

## 2019-11-02 PROCEDURE — 25000003 PHARM REV CODE 250: Performed by: STUDENT IN AN ORGANIZED HEALTH CARE EDUCATION/TRAINING PROGRAM

## 2019-11-02 PROCEDURE — 80053 COMPREHEN METABOLIC PANEL: CPT

## 2019-11-02 PROCEDURE — 25000003 PHARM REV CODE 250: Performed by: NURSE PRACTITIONER

## 2019-11-02 PROCEDURE — 94761 N-INVAS EAR/PLS OXIMETRY MLT: CPT

## 2019-11-02 PROCEDURE — 94640 AIRWAY INHALATION TREATMENT: CPT

## 2019-11-02 PROCEDURE — 85025 COMPLETE CBC W/AUTO DIFF WBC: CPT

## 2019-11-02 PROCEDURE — 83735 ASSAY OF MAGNESIUM: CPT

## 2019-11-02 PROCEDURE — 36415 COLL VENOUS BLD VENIPUNCTURE: CPT

## 2019-11-02 RX ORDER — LIDOCAINE HYDROCHLORIDE 10 MG/ML
10 INJECTION INFILTRATION; PERINEURAL ONCE
Status: COMPLETED | OUTPATIENT
Start: 2019-11-02 | End: 2019-11-02

## 2019-11-02 RX ADMIN — FAMOTIDINE 20 MG: 20 TABLET ORAL at 08:11

## 2019-11-02 RX ADMIN — LIDOCAINE HYDROCHLORIDE 5 ML: 10 INJECTION, SOLUTION EPIDURAL; INFILTRATION; INTRACAUDAL; PERINEURAL at 08:11

## 2019-11-02 RX ADMIN — LEVALBUTEROL HYDROCHLORIDE 0.63 MG: 0.63 SOLUTION RESPIRATORY (INHALATION) at 07:11

## 2019-11-02 RX ADMIN — OXYCODONE HYDROCHLORIDE AND ACETAMINOPHEN 1 TABLET: 10; 325 TABLET ORAL at 04:11

## 2019-11-02 RX ADMIN — OXYCODONE HYDROCHLORIDE AND ACETAMINOPHEN 1 TABLET: 10; 325 TABLET ORAL at 08:11

## 2019-11-02 RX ADMIN — DOCUSATE SODIUM 50 MG: 50 CAPSULE, LIQUID FILLED ORAL at 08:11

## 2019-11-02 NOTE — PROGRESS NOTES
[facility]  General Surgery  Progress Note    Subjective:     History of Present Illness:  This is a 59 y.o. male here for evaluation of pancreas cyst and abdominal pain. The patient is carrying a diagnosis of side branched IPMN since 2013 with multiple EUS's with FNA (CEA >200). FNA did not demonstrated evidence of malignancy. He was follow with the combination of EUS's and imaging which demonstrated evidence of stability to decreased size of the pancreas cyst. The cyst is located in the body of the pancreas and measured <3 cm. There is evidence of progressive dilation of the PD up to 8 mm. In 2017 he underwent pancreatoscopy for biopsies. The patient developed pancreatitis and since then he stated intermittent episodes of abdominal pain. He have two EUS's with celiac plexus block which improved his symptoms. He stated he is very functional except with the episodes of pain for what he used acetaminophen. He doesn't smoke or drink alcohol. Denied family history of pancreatic cancer. He is S/P cholecystectomy. He was just discharge form the hospital with acute pancreatitis (2 weeks ago). This is his second episodes. Today he complaint of diffuse abdominal pain with nausea and diarrhea. Had a EUS today and was sent to surgical oncology clinic due to findings. He states that the abdominal pain increases when he had a large meal or eats sugar meals.      Post-Op Info:  Procedure(s) (LRB):  WHIPPLE PROCEDURE (N/A)   8 Days Post-Op     Interval History:   No acute events overnight. Tolerated a regular diet without any trouble.  No nausea or vomit. Adequate pain control with oral meds.  Still tachycardic HR:   2 BMs yesterday.  Leaking from BJ drain, suture placed to tightening opening at bedside.      Medications:  Continuous Infusions:    Scheduled Meds:   docusate sodium  50 mg Oral Daily    enoxaparin  40 mg Subcutaneous Daily    famotidine  20 mg Oral BID    levalbuterol  0.63 mg  Nebulization Q6H WAKE    simvastatin  40 mg Oral QHS     PRN Meds:diphenhydrAMINE, influenza, naloxone, ondansetron, oxyCODONE-acetaminophen, oxyCODONE-acetaminophen, sodium chloride 0.9%     Review of patient's allergies indicates:   Allergen Reactions    Codeine Other (See Comments)     headache    Aspirin Rash    Penicillins Rash     Objective:     Vital Signs (Most Recent):  Temp: 97 °F (36.1 °C) (11/02/19 0818)  Pulse: 110 (11/02/19 0818)  Resp: 16 (11/02/19 0818)  BP: 123/70 (11/02/19 0818)  SpO2: 95 % (11/02/19 0818) Vital Signs (24h Range):  Temp:  [97 °F (36.1 °C)-98.9 °F (37.2 °C)] 97 °F (36.1 °C)  Pulse:  [] 110  Resp:  [14-18] 16  SpO2:  [93 %-95 %] 95 %  BP: ()/(68-72) 123/70     Weight: 65.3 kg (143 lb 15.4 oz)  Body mass index is 23.96 kg/m².    Intake/Output - Last 3 Shifts       10/31 0700 - 11/01 0659 11/01 0700 - 11/02 0659 11/02 0700 - 11/03 0659    P.O. 960 1600     I.V. (mL/kg)       Total Intake(mL/kg) 960 (14.7) 1600 (24.5)     Urine (mL/kg/hr) 0 (0) 0 (0)     Drains 30 50     Stool 0 0     Total Output 30 50     Net +930 +1550            Urine Occurrence 6 x 7 x     Stool Occurrence 2 x 2 x           Physical Exam   Constitutional: He appears well-developed and well-nourished.   HENT:   Head: Normocephalic.   Eyes: Pupils are equal, round, and reactive to light. Conjunctivae and EOM are normal.   Neck: Normal range of motion. Neck supple.   Cardiovascular: Normal rate and regular rhythm.   Pulmonary/Chest: Effort normal.   Abdominal: Soft.   Incions c/d/i covered with dermabond     Musculoskeletal: Normal range of motion.   Neurological: He is alert.   Skin: Skin is warm. Capillary refill takes less than 2 seconds.       Significant Labs:  CBC:   Recent Labs   Lab 11/02/19  0529   WBC 10.29   RBC 4.01*   HGB 12.4*   HCT 37.2*   *   MCV 93   MCH 30.9   MCHC 33.3     BMP:   Recent Labs   Lab 11/02/19  0529         K 4.5      CO2 27   BUN 13    CREATININE 0.7   CALCIUM 9.2   MG 2.2       Significant Diagnostics:  I have reviewed and interpreted all pertinent imaging results/findings within the past 24 hours.    Assessment/Plan:     * Pancreas cyst  59 y.o male PMH of IPMN in the uncinate that wass been followed appropriately for years, with recent increase in size as well as new PD dilation to 1.3cm. POD 7 of Whipple procedure.     - Regular diet  - Pathway    - PT/OT  - Encourage IS  - OOBTC x 2  - Ambulate x3  - Pulmonary toilet.   - OK to Shower  - Lovenox, SCDs.   - Colace   - Replace lytes PRN     Dispo: Discharge today        Vaibhav Dillard MD  General Surgery  [facility]

## 2019-11-02 NOTE — SUBJECTIVE & OBJECTIVE
Interval History:   No acute events overnight. Tolerated a regular diet without any trouble.  No nausea or vomit. Adequate pain control with oral meds.  Still tachycardic HR:   2 BMs yesterday.  Leaking from BJ drain, suture placed to tightening opening at bedside.      Medications:  Continuous Infusions:    Scheduled Meds:   docusate sodium  50 mg Oral Daily    enoxaparin  40 mg Subcutaneous Daily    famotidine  20 mg Oral BID    levalbuterol  0.63 mg Nebulization Q6H WAKE    simvastatin  40 mg Oral QHS     PRN Meds:diphenhydrAMINE, influenza, naloxone, ondansetron, oxyCODONE-acetaminophen, oxyCODONE-acetaminophen, sodium chloride 0.9%     Review of patient's allergies indicates:   Allergen Reactions    Codeine Other (See Comments)     headache    Aspirin Rash    Penicillins Rash     Objective:     Vital Signs (Most Recent):  Temp: 97 °F (36.1 °C) (11/02/19 0818)  Pulse: 110 (11/02/19 0818)  Resp: 16 (11/02/19 0818)  BP: 123/70 (11/02/19 0818)  SpO2: 95 % (11/02/19 0818) Vital Signs (24h Range):  Temp:  [97 °F (36.1 °C)-98.9 °F (37.2 °C)] 97 °F (36.1 °C)  Pulse:  [] 110  Resp:  [14-18] 16  SpO2:  [93 %-95 %] 95 %  BP: ()/(68-72) 123/70     Weight: 65.3 kg (143 lb 15.4 oz)  Body mass index is 23.96 kg/m².    Intake/Output - Last 3 Shifts       10/31 0700 - 11/01 0659 11/01 0700 - 11/02 0659 11/02 0700 - 11/03 0659    P.O. 960 1600     I.V. (mL/kg)       Total Intake(mL/kg) 960 (14.7) 1600 (24.5)     Urine (mL/kg/hr) 0 (0) 0 (0)     Drains 30 50     Stool 0 0     Total Output 30 50     Net +930 +1550            Urine Occurrence 6 x 7 x     Stool Occurrence 2 x 2 x           Physical Exam   Constitutional: He appears well-developed and well-nourished.   HENT:   Head: Normocephalic.   Eyes: Pupils are equal, round, and reactive to light. Conjunctivae and EOM are normal.   Neck: Normal range of motion. Neck supple.   Cardiovascular: Normal rate and regular rhythm.   Pulmonary/Chest: Effort  normal.   Abdominal: Soft.   Incions c/d/i covered with dermabond     Musculoskeletal: Normal range of motion.   Neurological: He is alert.   Skin: Skin is warm. Capillary refill takes less than 2 seconds.       Significant Labs:  CBC:   Recent Labs   Lab 11/02/19  0529   WBC 10.29   RBC 4.01*   HGB 12.4*   HCT 37.2*   *   MCV 93   MCH 30.9   MCHC 33.3     BMP:   Recent Labs   Lab 11/02/19  0529         K 4.5      CO2 27   BUN 13   CREATININE 0.7   CALCIUM 9.2   MG 2.2       Significant Diagnostics:  I have reviewed and interpreted all pertinent imaging results/findings within the past 24 hours.

## 2019-11-02 NOTE — ASSESSMENT & PLAN NOTE
59 y.o male PMH of IPMN in the uncinate that wass been followed appropriately for years, with recent increase in size as well as new PD dilation to 1.3cm. POD 7 of Whipple procedure.     - Regular diet  - Pathway    - PT/OT  - Encourage IS  - OOBTC x 2  - Ambulate x3  - Pulmonary toilet.   - OK to Shower  - Lovenox, SCDs.   - Colace   - Replace lytes PRN     Dispo: Discharge today

## 2019-11-02 NOTE — PLAN OF CARE
11/01/19 1904   Discharge Reassessment   Assessment Type Discharge Planning Reassessment   Provided patient/caregiver education on the expected discharge date and the discharge plan Yes   Do you have any problems affording any of your prescribed medications? No   Discharge Plan A Home with family   Discharge Plan B Home Health;Home with family

## 2019-11-02 NOTE — DISCHARGE SUMMARY
[facility]  General Surgery  Discharge Summary      Patient Name: Jose Rafael Campbell  MRN: [id number]  Admission Date: 10/25/2019  Hospital Length of Stay: 8 days  Discharge Date and Time:  11/02/2019 2:11 PM  Attending Physician: Paul King MD  Discharging Provider: Jono Martinez MD  Primary Care Provider: Primary Doctor No     HPI: Healthy 58yo M with history of IPMN in the uncinate that has been followed appropriately for years, with recent increase in size as well as new PD dilation to 1.3cm. On EUS, he had mucin at the ampulla. Additionally he has developed intermittent pancreatitis. These are all indications for surgical resection.  I explained in detail the resection and the relevant risks. I also explained that while I do not believe this lesion is malignant, and that this operation is most likely to prevent future malignancy and palliate his symptoms, there is a chance that his final pathology may reveal a cancer that would require further treatments.  I also explained that he will still require surveillance of the rest of his gland, though with less frequency than his previous surveillance pattern.    Procedure(s) (LRB):  WHIPPLE PROCEDURE (N/A)     Hospital Course: Pt underwent a whipple and had an expected postop course only complicated by pancreatic leak which was treated by leaving surgical drain in place upon discharge. Upon discharge he was ambulating without assistance, tolerating a diet, having bowel function, and pain was well controlled.    Consults:   Consults (From admission, onward)        Status Ordering Provider     Inpatient consult to Registered Dietitian/Nutritionist  Once     Provider:  (Not yet assigned)    Completed PAUL KING          Significant Diagnostic Studies: See chart    Pending Diagnostic Studies:     None        Final Active Diagnoses:    Diagnosis Date Noted POA    PRINCIPAL PROBLEM:  Pancreas cyst [K86.2] 10/03/2019 Yes      Problems Resolved  During this Admission:      Discharged Condition: good    Disposition: Home or Self Care    Follow Up:  Follow-up Information     Paul King MD In 1 week.    Specialties:  General Surgery, Surgical Oncology  Why:  For wound re-check  Contact information:  Issac KA[address]  [facility] [id number] [phone]                 Patient Instructions:      Diet Adult Regular     Notify your health care provider if you experience any of the following:  temperature >100.4     Notify your health care provider if you experience any of the following:  persistent nausea and vomiting or diarrhea     Notify your health care provider if you experience any of the following:  severe uncontrolled pain     Notify your health care provider if you experience any of the following:  redness, tenderness, or signs of infection (pain, swelling, redness, odor or green/yellow discharge around incision site)     Notify your health care provider if you experience any of the following:  difficulty breathing or increased cough     Notify your health care provider if you experience any of the following:  severe persistent headache     Notify your health care provider if you experience any of the following:  worsening rash     Notify your health care provider if you experience any of the following:  persistent dizziness, light-headedness, or visual disturbances     Notify your health care provider if you experience any of the following:  increased confusion or weakness     Notify your health care provider if you experience any of the following:     No driving until:   Scheduling Instructions: Off pain medications     Other restrictions (specify):   Scheduling Instructions: Wash incisions gently with warm soap and water at least once a day.  Do not scrub hard.  Do not soak incision in water for 2 weeks. Skin glue will fall off on its own (10-14 days).     Lifting restrictions   Scheduling Instructions: Do not lift more than 10lbs in 6 weeks      Medications:  Reconciled Home Medications:      Medication List      START taking these medications    docusate sodium 50 MG capsule  Commonly known as:  COLACE  Take 1 capsule (50 mg total) by mouth daily as needed for Constipation.     oxyCODONE-acetaminophen 5-325 mg per tablet  Commonly known as:  PERCOCET  Take 1 tablet by mouth every 4 (four) hours as needed for Pain.        CONTINUE taking these medications    acetaminophen 325 MG tablet  Commonly known as:  TYLENOL  Take 650 mg by mouth.     CARAFATE 100 mg/mL suspension  Generic drug:  sucralfate  Take 1 g by mouth.     indomethacin 25 MG capsule  Commonly known as:  INDOCIN     omeprazole 20 MG capsule  Commonly known as:  PRILOSEC  Take 20 mg by mouth.     STRESS FORMULA WITH IRON 500 mg-400 mcg- 18 mg iron Tab  Generic drug:  vit B comp-C-FA-iron-vit E  Take 1 tablet by mouth.     vitamin D 1000 units Tab  Commonly known as:  VITAMIN D3  Take 1,000 Units by mouth.     ZOCOR 40 MG tablet  Generic drug:  simvastatin  Take 40 mg by mouth.            Jono Martinez MD  General Surgery  [facility]

## 2019-11-02 NOTE — PLAN OF CARE
POC reviewed with patient and wife at bedside with both verbalizing understanding. AAOx4. VSS on RA. No c/o nausea. C/o pain resolved with prn pain meds.  BJ drain has small amount of leakage. BJ output is small amount.Pt. Up ad dipika to urinate, No BMs overnight. Bed low and locked. No falls or acute events. Call light within reach. WCTM.

## 2019-11-02 NOTE — NURSING
Discharge instructions reviewed with patient at bedside who verbalized understanding, all questions answered. Patient leaving AAOx4, VSS. PIV d/c per order. Currently waiting on transport to leave unit.

## 2019-11-04 ENCOUNTER — NURSE TRIAGE (OUTPATIENT)
Dept: ADMINISTRATIVE | Facility: CLINIC | Age: 59
End: 2019-11-04

## 2019-11-05 ENCOUNTER — TELEPHONE (OUTPATIENT)
Dept: SURGERY | Facility: CLINIC | Age: 59
End: 2019-11-05

## 2019-11-05 DIAGNOSIS — K86.2 PANCREAS CYST: Primary | ICD-10-CM

## 2019-11-05 NOTE — TELEPHONE ENCOUNTER
Reason for Disposition   Nursing judgment    Additional Information   Negative: Nursing judgment   Negative: Information only call; adult is not ill or injured   Negative: Nursing judgment   Negative: Nursing judgment   Negative: Nursing judgment   Negative: Nursing judgment   Negative: Nursing judgment    Protocols used: NO GUIDELINE OR REFERENCE [id number]    Pt had whipple procedure done on 10/25 and was discharged 9 days later. Wife called traceyight to report white milky drainage that was steadily come from around drain in patient's side. Denies increase in pain but reports redness around site. Call was transferred to [facility] surgery intern for advisement

## 2019-11-05 NOTE — TELEPHONE ENCOUNTER
----- Message from Sweta Peña sent at 11/5/2019  8:05 AM CST -----  Contact: Tiffany Pt's wife  Needs Medical Advice    Who Called: Tiffany bruno's wife    Symptoms (please be specific): Wife states that patient has milky white discharge around his drainage tube.     Best Call Back Number: [phone]

## 2019-11-07 ENCOUNTER — HOSPITAL ENCOUNTER (OUTPATIENT)
Facility: HOSPITAL | Age: 59
Discharge: HOME | End: 2019-11-08
Attending: EMERGENCY MEDICINE | Admitting: SURGERY
Payer: OTHER GOVERNMENT

## 2019-11-07 ENCOUNTER — OFFICE VISIT (OUTPATIENT)
Dept: SURGERY | Facility: CLINIC | Age: 59
End: 2019-11-07
Payer: OTHER GOVERNMENT

## 2019-11-07 VITALS
SYSTOLIC BLOOD PRESSURE: 126 MMHG | TEMPERATURE: 97 F | HEART RATE: 106 BPM | DIASTOLIC BLOOD PRESSURE: 84 MMHG | WEIGHT: 132.5 LBS | BODY MASS INDEX: 22.08 KG/M2 | HEIGHT: 65 IN

## 2019-11-07 DIAGNOSIS — K86.2 PANCREAS CYST: Primary | ICD-10-CM

## 2019-11-07 DIAGNOSIS — G89.18 JACKSON PRATT DRAIN SITE PAIN: ICD-10-CM

## 2019-11-07 DIAGNOSIS — D49.0 IPMN (INTRADUCTAL PAPILLARY MUCINOUS NEOPLASM): Primary | ICD-10-CM

## 2019-11-07 DIAGNOSIS — R61 DIAPHORESIS: ICD-10-CM

## 2019-11-07 DIAGNOSIS — R10.84 GENERALIZED ABDOMINAL PAIN: ICD-10-CM

## 2019-11-07 DIAGNOSIS — R18.8 INTRAABDOMINAL FLUID COLLECTION: Primary | ICD-10-CM

## 2019-11-07 LAB
ALBUMIN SERPL BCP-MCNC: 3.2 G/DL (ref 3.5–5.2)
ALP SERPL-CCNC: 94 U/L (ref 55–135)
ALT SERPL W/O P-5'-P-CCNC: 35 U/L (ref 10–44)
ANION GAP SERPL CALC-SCNC: 13 MMOL/L (ref 8–16)
AST SERPL-CCNC: 22 U/L (ref 10–40)
BASOPHILS # BLD AUTO: 0.12 K/UL (ref 0–0.2)
BASOPHILS NFR BLD: 0.8 % (ref 0–1.9)
BILIRUB SERPL-MCNC: 0.3 MG/DL (ref 0.1–1)
BUN SERPL-MCNC: 18 MG/DL (ref 8–23)
CALCIUM SERPL-MCNC: 9.4 MG/DL (ref 8.7–10.5)
CHLORIDE SERPL-SCNC: 101 MMOL/L (ref 95–110)
CO2 SERPL-SCNC: 24 MMOL/L (ref 23–29)
CREAT SERPL-MCNC: 0.9 MG/DL (ref 0.5–1.4)
DIFFERENTIAL METHOD: ABNORMAL
EOSINOPHIL # BLD AUTO: 0.3 K/UL (ref 0–0.5)
EOSINOPHIL NFR BLD: 2 % (ref 0–8)
ERYTHROCYTE [DISTWIDTH] IN BLOOD BY AUTOMATED COUNT: 13.2 % (ref 11.5–14.5)
EST. GFR  (AFRICAN AMERICAN): >60 ML/MIN/1.73 M^2
EST. GFR  (NON AFRICAN AMERICAN): >60 ML/MIN/1.73 M^2
GLUCOSE SERPL-MCNC: 136 MG/DL (ref 70–110)
HCT VFR BLD AUTO: 40.9 % (ref 40–54)
HGB BLD-MCNC: 13.1 G/DL (ref 14–18)
IMM GRANULOCYTES # BLD AUTO: 0.44 K/UL (ref 0–0.04)
IMM GRANULOCYTES NFR BLD AUTO: 2.8 % (ref 0–0.5)
LIPASE SERPL-CCNC: 20 U/L (ref 4–60)
LYMPHOCYTES # BLD AUTO: 1.2 K/UL (ref 1–4.8)
LYMPHOCYTES NFR BLD: 7.6 % (ref 18–48)
MCH RBC QN AUTO: 29.7 PG (ref 27–31)
MCHC RBC AUTO-ENTMCNC: 32 G/DL (ref 32–36)
MCV RBC AUTO: 93 FL (ref 82–98)
MONOCYTES # BLD AUTO: 1 K/UL (ref 0.3–1)
MONOCYTES NFR BLD: 6.1 % (ref 4–15)
NEUTROPHILS # BLD AUTO: 12.7 K/UL (ref 1.8–7.7)
NEUTROPHILS NFR BLD: 80.7 % (ref 38–73)
NRBC BLD-RTO: 0 /100 WBC
PLATELET # BLD AUTO: 748 K/UL (ref 150–350)
PMV BLD AUTO: 8.3 FL (ref 9.2–12.9)
POTASSIUM SERPL-SCNC: 4.9 MMOL/L (ref 3.5–5.1)
PROT SERPL-MCNC: 7.2 G/DL (ref 6–8.4)
RBC # BLD AUTO: 4.41 M/UL (ref 4.6–6.2)
SODIUM SERPL-SCNC: 138 MMOL/L (ref 136–145)
TROPONIN I SERPL DL<=0.01 NG/ML-MCNC: <0.006 NG/ML (ref 0–0.03)
WBC # BLD AUTO: 15.7 K/UL (ref 3.9–12.7)

## 2019-11-07 PROCEDURE — 84484 ASSAY OF TROPONIN QUANT: CPT

## 2019-11-07 PROCEDURE — 25500020 PHARM REV CODE 255: Performed by: EMERGENCY MEDICINE

## 2019-11-07 PROCEDURE — G0378 HOSPITAL OBSERVATION PER HR: HCPCS

## 2019-11-07 PROCEDURE — 96375 TX/PRO/DX INJ NEW DRUG ADDON: CPT

## 2019-11-07 PROCEDURE — 85025 COMPLETE CBC W/AUTO DIFF WBC: CPT

## 2019-11-07 PROCEDURE — S0030 INJECTION, METRONIDAZOLE: HCPCS | Performed by: EMERGENCY MEDICINE

## 2019-11-07 PROCEDURE — 25000003 PHARM REV CODE 250: Performed by: STUDENT IN AN ORGANIZED HEALTH CARE EDUCATION/TRAINING PROGRAM

## 2019-11-07 PROCEDURE — 99285 EMERGENCY DEPT VISIT HI MDM: CPT | Mod: ,,, | Performed by: EMERGENCY MEDICINE

## 2019-11-07 PROCEDURE — 99285 EMERGENCY DEPT VISIT HI MDM: CPT | Mod: 25,27

## 2019-11-07 PROCEDURE — 63600175 PHARM REV CODE 636 W HCPCS: Performed by: EMERGENCY MEDICINE

## 2019-11-07 PROCEDURE — 93010 ELECTROCARDIOGRAM REPORT: CPT | Mod: ,,, | Performed by: INTERNAL MEDICINE

## 2019-11-07 PROCEDURE — 80053 COMPREHEN METABOLIC PANEL: CPT

## 2019-11-07 PROCEDURE — 93010 EKG 12-LEAD: ICD-10-PCS | Mod: ,,, | Performed by: INTERNAL MEDICINE

## 2019-11-07 PROCEDURE — 96361 HYDRATE IV INFUSION ADD-ON: CPT

## 2019-11-07 PROCEDURE — 63600175 PHARM REV CODE 636 W HCPCS: Performed by: STUDENT IN AN ORGANIZED HEALTH CARE EDUCATION/TRAINING PROGRAM

## 2019-11-07 PROCEDURE — 99999 PR PBB SHADOW E&M-EST. PATIENT-LVL III: CPT | Mod: PBBFAC,,, | Performed by: SURGERY

## 2019-11-07 PROCEDURE — 99213 OFFICE O/P EST LOW 20 MIN: CPT | Mod: PBBFAC,25 | Performed by: SURGERY

## 2019-11-07 PROCEDURE — 99024 POSTOP FOLLOW-UP VISIT: CPT | Mod: ,,, | Performed by: SURGERY

## 2019-11-07 PROCEDURE — 83690 ASSAY OF LIPASE: CPT

## 2019-11-07 PROCEDURE — 25000003 PHARM REV CODE 250: Performed by: EMERGENCY MEDICINE

## 2019-11-07 PROCEDURE — 99285 PR EMERGENCY DEPT VISIT,LEVEL V: ICD-10-PCS | Mod: ,,, | Performed by: EMERGENCY MEDICINE

## 2019-11-07 PROCEDURE — 99999 PR PBB SHADOW E&M-EST. PATIENT-LVL III: ICD-10-PCS | Mod: PBBFAC,,, | Performed by: SURGERY

## 2019-11-07 PROCEDURE — 93005 ELECTROCARDIOGRAM TRACING: CPT

## 2019-11-07 PROCEDURE — 96374 THER/PROPH/DIAG INJ IV PUSH: CPT

## 2019-11-07 PROCEDURE — 99024 PR POST-OP FOLLOW-UP VISIT: ICD-10-PCS | Mod: ,,, | Performed by: SURGERY

## 2019-11-07 RX ORDER — OXYCODONE AND ACETAMINOPHEN 10; 325 MG/1; MG/1
1 TABLET ORAL EVERY 6 HOURS PRN
Status: DISCONTINUED | OUTPATIENT
Start: 2019-11-07 | End: 2019-11-08 | Stop reason: HOSPADM

## 2019-11-07 RX ORDER — ACETAMINOPHEN 325 MG/1
650 TABLET ORAL EVERY 8 HOURS PRN
Status: DISCONTINUED | OUTPATIENT
Start: 2019-11-07 | End: 2019-11-08 | Stop reason: HOSPADM

## 2019-11-07 RX ORDER — METRONIDAZOLE 500 MG/100ML
500 INJECTION, SOLUTION INTRAVENOUS
Status: COMPLETED | OUTPATIENT
Start: 2019-11-07 | End: 2019-11-07

## 2019-11-07 RX ORDER — ONDANSETRON 2 MG/ML
4 INJECTION INTRAMUSCULAR; INTRAVENOUS
Status: COMPLETED | OUTPATIENT
Start: 2019-11-07 | End: 2019-11-07

## 2019-11-07 RX ORDER — ONDANSETRON 8 MG/1
8 TABLET, ORALLY DISINTEGRATING ORAL EVERY 8 HOURS PRN
Status: DISCONTINUED | OUTPATIENT
Start: 2019-11-07 | End: 2019-11-08 | Stop reason: HOSPADM

## 2019-11-07 RX ORDER — OXYCODONE AND ACETAMINOPHEN 5; 325 MG/1; MG/1
1 TABLET ORAL EVERY 6 HOURS PRN
Status: DISCONTINUED | OUTPATIENT
Start: 2019-11-07 | End: 2019-11-08 | Stop reason: HOSPADM

## 2019-11-07 RX ORDER — SODIUM CHLORIDE 0.9 % (FLUSH) 0.9 %
10 SYRINGE (ML) INJECTION
Status: DISCONTINUED | OUTPATIENT
Start: 2019-11-07 | End: 2019-11-08 | Stop reason: HOSPADM

## 2019-11-07 RX ORDER — MORPHINE SULFATE 4 MG/ML
4 INJECTION, SOLUTION INTRAMUSCULAR; INTRAVENOUS
Status: COMPLETED | OUTPATIENT
Start: 2019-11-07 | End: 2019-11-07

## 2019-11-07 RX ORDER — HYDROMORPHONE HYDROCHLORIDE 1 MG/ML
1 INJECTION, SOLUTION INTRAMUSCULAR; INTRAVENOUS; SUBCUTANEOUS
Status: COMPLETED | OUTPATIENT
Start: 2019-11-07 | End: 2019-11-07

## 2019-11-07 RX ORDER — CEFEPIME HYDROCHLORIDE 2 G/1
2 INJECTION, POWDER, FOR SOLUTION INTRAVENOUS
Status: COMPLETED | OUTPATIENT
Start: 2019-11-07 | End: 2019-11-07

## 2019-11-07 RX ADMIN — OXYCODONE HYDROCHLORIDE AND ACETAMINOPHEN 1 TABLET: 10; 325 TABLET ORAL at 03:11

## 2019-11-07 RX ADMIN — HYDROMORPHONE HYDROCHLORIDE 1 MG: 1 INJECTION, SOLUTION INTRAMUSCULAR; INTRAVENOUS; SUBCUTANEOUS at 12:11

## 2019-11-07 RX ADMIN — MORPHINE SULFATE 4 MG: 4 INJECTION INTRAVENOUS at 11:11

## 2019-11-07 RX ADMIN — CEFEPIME 2 G: 2 INJECTION, POWDER, FOR SOLUTION INTRAVENOUS at 02:11

## 2019-11-07 RX ADMIN — IOHEXOL 75 ML: 350 INJECTION, SOLUTION INTRAVENOUS at 12:11

## 2019-11-07 RX ADMIN — OXYCODONE HYDROCHLORIDE AND ACETAMINOPHEN 1 TABLET: 10; 325 TABLET ORAL at 10:11

## 2019-11-07 RX ADMIN — METRONIDAZOLE 500 MG: 500 INJECTION, SOLUTION INTRAVENOUS at 02:11

## 2019-11-07 RX ADMIN — SODIUM CHLORIDE 500 ML: 0.9 INJECTION, SOLUTION INTRAVENOUS at 11:11

## 2019-11-07 RX ADMIN — SODIUM CHLORIDE 1000 ML: 0.9 INJECTION, SOLUTION INTRAVENOUS at 03:11

## 2019-11-07 RX ADMIN — ONDANSETRON 4 MG: 2 INJECTION INTRAMUSCULAR; INTRAVENOUS at 11:11

## 2019-11-07 NOTE — H&P
[facility]  General Surgery  History & Physical    Patient Name: Jose Rafael Campbell  MRN: [id number]  Admission Date: 11/7/2019  Attending Physician: Jayant Robert MD   Primary Care Provider: Primary Doctor No    Patient information was obtained from patient and ER records.     Subjective:     Chief Complaint/Reason for Admission:   Abdominal pain after drain removal     History of Present Illness:  Jose Rafael Campbell is a 59 y.o. year old male who presents to the Surgical Oncology Clinic on 11/07/2019 for follow-up from [place] on 10/25. Pt underwent the procedure for IPMN in the uncinate process. It had initially be evaluate and observed, however on recent imaging it had enlarged to 1.3cm. This, in conjunction with new onset pancreatitis and EUS with findings of mucin at the ampulla prompted surgical excision    Pts post-operative course was uncomplicated and he was discharged home on 11/2. He presents to clinic today prior to his previously scheduled appointment for evaluation of drainage around his RLQ drain site. Pt and spouse note he has overall done well since going home. He does report poor appetite and minimal PO intake in conjunction with a generalized feeling of tiredness, but his pain has been controlled without significant narcotic use, he is ambulatory, having BMs and is otherwise in good spirits. They note minimal output through the drain (<100cc of grey, thin, pancreatic necrosis appearing fluid) but has been having moderate chylous appearing output around drain     Drain pulled in clinic, no issues initially. Approx 5 min later patient found to be in excruciating abdominal pain. Vitals rechecked.  's/70's. Afebrile. Pain sharp, epigastric, unrelenting. Associated with nausea. Took 10mg oxycodone without relief after ~30min. Taken to the ED for evaluation.     At ED pain was controlled with IV narcotics, adequate results. CT Scan did not showed any abscess. Patient was  started in antibiotics.     No current facility-administered medications on file prior to encounter.      Current Outpatient Medications on File Prior to Encounter   Medication Sig    acetaminophen (TYLENOL) 325 MG tablet Take 650 mg by mouth.    docusate sodium (COLACE) 50 MG capsule Take 1 capsule (50 mg total) by mouth daily as needed for Constipation.    indomethacin (INDOCIN) 25 MG capsule     omeprazole (PRILOSEC) 20 MG capsule Take 20 mg by mouth.    oxyCODONE-acetaminophen (PERCOCET) 5-325 mg per tablet Take 1 tablet by mouth every 4 (four) hours as needed for Pain.    simvastatin (ZOCOR) 40 MG tablet Take 40 mg by mouth.    sucralfate (CARAFATE) 100 mg/mL suspension Take 1 g by mouth.    vit B comp-C-FA-iron-vit E (STRESS FORMULA WITH IRON) 500 mg-400 mcg- 18 mg iron Tab Take 1 tablet by mouth.    vitamin D (VITAMIN D3) 1000 units Tab Take 1,000 Units by mouth.       Review of patient's allergies indicates:   Allergen Reactions    Codeine Other (See Comments)     headache    Aspirin Rash    Penicillins Rash       Past Medical History:   Diagnosis Date    Abdominal pain     Diarrhea     IPMN (intraductal papillary mucinous neoplasm)     Nausea     Pancreas cyst     Pancreatic duct dilated     Pancreatitis      Past Surgical History:   Procedure Laterality Date    APPENDECTOMY      ENDOSCOPIC ULTRASOUND OF UPPER GASTROINTESTINAL TRACT N/A 10/3/2019    Procedure: ULTRASOUND, UPPER GI TRACT, ENDOSCOPIC;  Surgeon: Harsh Clark MD;  Location: [address]);  Service: Endoscopy;  Laterality: N/A;    INGUINAL HERNIA REPAIR      NISSEN FUNDOPLICATION      WHIPPLE PROCEDURE N/A 10/25/2019    Procedure: WHIPPLE PROCEDURE;  Surgeon: Paul King MD;  Location: [address];  Service: General;  Laterality: N/A;     Family History     None        Tobacco Use    Smoking status: Former Smoker     Packs/day: 1.00     Years: 10.00     Pack years: 10.00     Types: Cigarettes      Last attempt to quit: 1999     Years since quittin.8   Substance and Sexual Activity    Alcohol use: Not Currently     Frequency: Never    Drug use: Never    Sexual activity: Not on file     Review of Systems  Objective:     Vital Signs (Most Recent):  Temp: (not registering) (19 1110)  Pulse: 106 (19 1420)  Resp: 18 (19 1420)  BP: 115/79 (19 1420)  SpO2: 97 % (19 1420) Vital Signs (24h Range):  Temp:  [96.8 °F (36 °C)-98.3 °F (36.8 °C)] 96.8 °F (36 °C)  Pulse:  [100-125] 106  Resp:  [18-20] 18  SpO2:  [96 %-97 %] 97 %  BP: (111-137)/(74-85) 115/79     Weight: 59 kg (130 lb)  Body mass index is 21.63 kg/m².    Physical Exam    Significant Labs:  CBC:   Recent Labs   Lab 19  1145   WBC 15.70*   RBC 4.41*   HGB 13.1*   HCT 40.9   *   MCV 93   MCH 29.7   MCHC 32.0     BMP:   Recent Labs   Lab 19  0529 19  1145    136*    138   K 4.5 4.9    101   CO2 27 24   BUN 13 18   CREATININE 0.7 0.9   CALCIUM 9.2 9.4   MG 2.2  --        Significant Diagnostics:  I have reviewed and interpreted all pertinent imaging results/findings within the past 24 hours.    Abdomen and Pelvis CT   Impression       1. Interval postsurgical changes of Whipple procedure with simple appearing fluid collection along the posteromedial aspect of the liver concerning for postoperative biloma or seroma.  Disorganized fluid is present throughout the surgical bed.  No rim enhancing fluid collection to suggest abscess.  2. Wall thickening of the proximal small bowel about the hepaticojejunostomy which may reflect a nonspecific inflammatory or infectious process.  3. Stable hepatic cysts and additional subcentimeter hepatic hypodensities which are too small to characterize likely also cysts.  4. Stable enhancing lesion in the right hepatic lobe most consistent with a hemangioma.  5. Small volume pelvic free fluid.  6. Nonobstructive renal stones bilaterally.  7. Small  amount of intraluminal air within the urinary bladder which may be secondary to recent instrumentation with gas-forming infection less likely but not excluded.  Clinical correlation is recommended.  8. Bibasilar pulmonary opacities most compatible with atelectasis.  9. Small paraesophageal hernia.  10. Additional findings as above.  This report was flagged in Epic as abnormal.           Assessment/Plan:     Pt is a 58 yo M s/p Whipple for IPMN 10/25 with uncomplicated hospital course. Discharged 11/2. After drain pulled in clinic with severe abdominal pain, nausea. CT scan showed small amount of fluid around the surgical bed, no abscess.     - Place in observation  - Transfer to [facility]  - Continue IV antibiotics  - PRN Percocet for pain control  - Regular diet  - SDCs, Ambulate  - Morning labs      Humphrey Ramos MD  General Surgery  [facility]

## 2019-11-07 NOTE — ED PROVIDER NOTES
Encounter Date: 11/7/2019       History     Chief Complaint   Patient presents with    Abdominal Pain     Sent from Dr. King clinic for severe abdomina pain after having whipple drain pulled by staff. Took 2 percocets but still in lots of pain. + nausea.      59-year-old male with a history of a pancreatic cyst status post Whipple operation on the 25th of October was in the surgical oncology clinic prior to arrival and had a drain pulled.  Following his drain pull patient had immediate intense abdominal pain. Unresolved despite Percocet x2.  He was sent to the ED for evaluation.  Pain is worse when he lies flat.  Patient denies nausea, vomiting, diarrhea, fever, cough, shortness of breath, chest pain, or dysuria.  A ten point review of systems was completed and is negative except as documented above.  Patient denies any other acute medical complaint.  The patients available PMH, PSH, Social History, medications, allergies, and triage vital signs were reviewed just prior to their medical evaluation.        Review of patient's allergies indicates:   Allergen Reactions    Codeine Other (See Comments)     headache    Aspirin Rash    Penicillins Rash     Past Medical History:   Diagnosis Date    Abdominal pain     Diarrhea     IPMN (intraductal papillary mucinous neoplasm)     Nausea     Pancreas cyst     Pancreatic duct dilated     Pancreatitis      Past Surgical History:   Procedure Laterality Date    APPENDECTOMY      ENDOSCOPIC ULTRASOUND OF UPPER GASTROINTESTINAL TRACT N/A 10/3/2019    Procedure: ULTRASOUND, UPPER GI TRACT, ENDOSCOPIC;  Surgeon: Harsh Clark MD;  Location: [facility] ([address] [place] [address]);  Service: Endoscopy;  Laterality: N/A;    INGUINAL HERNIA REPAIR      NISSEN FUNDOPLICATION      WHIPPLE PROCEDURE N/A 10/25/2019    Procedure: WHIPPLE PROCEDURE;  Surgeon: Paul King MD;  Location: [facility] OR [address] [place] [address];  Service: General;  Laterality: N/A;     History reviewed. No pertinent family  history.  Social History     Tobacco Use    Smoking status: Former Smoker     Packs/day: 1.00     Years: 10.00     Pack years: 10.00     Types: Cigarettes     Last attempt to quit: 1999     Years since quittin.8   Substance Use Topics    Alcohol use: Not Currently     Frequency: Never    Drug use: Never     Review of Systems   Constitutional: Positive for diaphoresis. Negative for fever.   HENT: Negative for sore throat.    Eyes: Negative for visual disturbance.   Respiratory: Negative for cough and shortness of breath.    Cardiovascular: Negative for chest pain.   Gastrointestinal: Positive for abdominal pain. Negative for diarrhea, nausea and vomiting.   Genitourinary: Negative for dysuria.   Musculoskeletal: Negative for neck pain.   Skin: Negative for rash and wound.   Allergic/Immunologic: Negative for immunocompromised state.   Neurological: Negative for syncope.   Psychiatric/Behavioral: Negative for confusion.   All other systems reviewed and are negative.      Physical Exam     Initial Vitals [19 1110]   BP Pulse Resp Temp SpO2   115/79 (!) 125 20 -- 97 %      MAP       --         Physical Exam    Nursing note and vitals reviewed.  Constitutional: He appears well-developed and well-nourished. He is diaphoretic. No distress.   HENT:   Head: Normocephalic and atraumatic.   Nose: Nose normal.   Eyes: Conjunctivae are normal. Right eye exhibits no discharge. Left eye exhibits no discharge.   Neck: Normal range of motion. Neck supple.   Cardiovascular: Normal rate, regular rhythm and normal heart sounds. Exam reveals no gallop and no friction rub.    No murmur heard.  Pulmonary/Chest: Breath sounds normal. No respiratory distress. He has no wheezes. He has no rhonchi. He has no rales.   Abdominal: Soft. He exhibits no distension. There is tenderness. There is guarding. There is no rebound.   Incision clean/dry/intact, dressing to RLQ, diffuse ttp   Musculoskeletal: Normal range of motion. He  exhibits no edema or tenderness.   Neurological: He is alert and oriented to person, place, and time. He has normal strength. GCS score is 15. GCS eye subscore is 4. GCS verbal subscore is 5. GCS motor subscore is 6.   Skin: Skin is warm. No rash noted. No erythema.   Psychiatric: He has a normal mood and affect. His behavior is normal. Judgment and thought content normal.         ED Course   Procedures  Labs Reviewed   CBC W/ AUTO DIFFERENTIAL - Abnormal; Notable for the following components:       Result Value    WBC 15.70 (*)     RBC 4.41 (*)     Hemoglobin 13.1 (*)     Platelets 748 (*)     MPV 8.3 (*)     Immature Granulocytes 2.8 (*)     Gran # (ANC) 12.7 (*)     Immature Grans (Abs) 0.44 (*)     Gran% 80.7 (*)     Lymph% 7.6 (*)     All other components within normal limits   COMPREHENSIVE METABOLIC PANEL - Abnormal; Notable for the following components:    Glucose 136 (*)     Albumin 3.2 (*)     All other components within normal limits   LIPASE   TROPONIN I   TROPONIN I    Narrative:     ADD ON TROPONIN PER DR AMY MORSE  11/07/2019  13:13    URINALYSIS, REFLEX TO URINE CULTURE     EKG Readings: (Independently Interpreted)   Initial Reading: No STEMI. Rhythm: Sinus Tachycardia. Heart Rate: 108. Ectopy: No Ectopy. Conduction: Normal. ST Segments: Normal ST Segments. T Waves: Normal.     ECG Results          EKG 12-lead (Final result)  Result time 11/07/19 13:56:28    Final result by Interface, Lab In [facility] (11/07/19 13:56:28)                 Narrative:    Test Reason : R61,    Vent. Rate : 108 BPM     Atrial Rate : 108 BPM     P-R Int : 158 ms          QRS Dur : 080 ms      QT Int : 324 ms       P-R-T Axes : 065 077 052 degrees     QTc Int : 434 ms    Age and gender specific analysis  Sinus tachycardia  Otherwise normal ECG  When compared with ECG of 28-OCT-2019 03:07,  No significant change was found  Confirmed by JN KELLY MD ([id number]) on 11/7/2019 1:56:19 PM    Referred By: JOJO TREJO            Confirmed By:JN KELLY MD                            Imaging Results          X-Ray Chest AP Portable (Final result)  Result time 11/07/19 13:48:57    Final result by Sweta Weiss MD (11/07/19 13:48:57)                 Impression:      Bibasilar subsegmental atelectasis.      Electronically signed by: Sweta Weiss MD  Date:    11/07/2019  Time:    13:48             Narrative:    EXAMINATION:  XR CHEST AP PORTABLE    CLINICAL HISTORY:  Generalized hyperhidrosis    TECHNIQUE:  Single frontal view of the chest was performed.    COMPARISON:  Prior from 10/28/2019    FINDINGS:  The mediastinal structures are midline.  The cardiac silhouette is not enlarged.  There is bibasilar subsegmental atelectasis.  No definite pleural fluid.  There has been interval removal of nasogastric tube.  No pneumothorax or definite consolidation.    No osseous abnormalities are seen.                                CT Abdomen Pelvis With Contrast (Final result)  Result time 11/07/19 14:11:54    Final result by Óscar Sun Jr., MD (11/07/19 14:11:54)                 Impression:      1. Interval postsurgical changes of Whipple procedure with simple appearing fluid collection along the posteromedial aspect of the liver concerning for postoperative biloma or seroma.  Disorganized fluid is present throughout the surgical bed.  No rim enhancing fluid collection to suggest abscess.  2. Wall thickening of the proximal small bowel about the hepaticojejunostomy which may reflect a nonspecific inflammatory or infectious process.  3. Stable hepatic cysts and additional subcentimeter hepatic hypodensities which are too small to characterize likely also cysts.  4. Stable enhancing lesion in the right hepatic lobe most consistent with a hemangioma.  5. Small volume pelvic free fluid.  6. Nonobstructive renal stones bilaterally.  7. Small amount of intraluminal air within the urinary bladder which may be secondary to recent  instrumentation with gas-forming infection less likely but not excluded.  Clinical correlation is recommended.  8. Bibasilar pulmonary opacities most compatible with atelectasis.  9. Small paraesophageal hernia.  10. Additional findings as above.  This report was flagged in Epic as abnormal.    Electronically signed by resident: Joe Simon  Date:    11/07/2019  Time:    13:38    Electronically signed by: Óscar Sun MD  Date:    11/07/2019  Time:    14:11             Narrative:    EXAMINATION:  CT ABDOMEN PELVIS WITH CONTRAST    CLINICAL HISTORY:  Abd pain, fever, abscess suspected;    TECHNIQUE:  The patient was surveyed from the lung bases through the pelvis after the administration of 75 cc Omni 350 IV contrast.  The data was reconstructed for coronal, sagittal, and axial images.    COMPARISON:  CT 09/12/2019.    FINDINGS:  The lung bases demonstrate extensive, predominantly bandlike opacities likely reflecting atelectasis.  No pleural effusion is seen.  The visualized portions of the heart appear normal. No pericardial effusion.  A small paraesophageal hernia is present.    There have been interval postsurgical changes of Whipple procedure with hepaticojejunostomy.  Simple appearing fluid is present along the posteromedial aspect of the liver without rim enhancement to suggest abscess.  Disorganized free fluid is present throughout the surgical bed.  Remaining portions of the stomach demonstrate no significant abnormality.  Remaining pancreatic body and tail demonstrate no evidence of mass or peripancreatic inflammation.  There is continued prominence of the pancreatic duct measuring up to 0.4 cm, similar to prior.    The liver is normal in size and attenuation.  There are multiple stable hypodensities throughout the liver consistent with cysts and stable subcentimeter hypodensities which are too small to characterize.  An enhancing lesion in the periphery of the right hepatic lobe stable in size when  compared to CT 09/12/2019 is most compatible with a hemangioma.  The gallbladder is surgically absent.  No intrahepatic or extrahepatic biliary ductal dilatation is identified. The spleen is unremarkable.  The portal vein, splenic vein, and SMV are patent.    The adrenal glands are unremarkable.  The kidneys are normal in size and location.  Nonobstructive stones are present bilaterally.  No hydronephrosis is seen.  The ureters appear normal in course and caliber. The urinary bladder contains a small amount of intraluminal air.  The prostate contains dystrophic calcifications.    There is wall thickening of the proximal small bowel about the hepaticojejunostomy.  No evidence of bowel obstruction.  Colonic diverticulosis is identified without evidence of diverticulitis. Small volume free fluid identified in the pelvis.  No intraperitoneal free air is identified.  There is no evidence of lymph node enlargement in the abdomen or pelvis.  The abdominal aorta is normal in course and caliber with mild atherosclerotic calcifications extending into the iliac arteries.    The osseous structures demonstrate no significant abnormality.   The extraperitoneal soft tissues are unremarkable.                                 Medical Decision Making:   History:   I obtained history from: someone other than patient.       <> Summary of History: Wife assists HPI  Old Medical Records: I decided to obtain old medical records.  Old Records Summarized: records from clinic visits.  Clinical Tests:   Lab Tests: Ordered and Reviewed  Radiological Study: Ordered and Reviewed  ED Management:  59-year-old male presents with intense abdominal pain after a drain was pulled.  Vitals with tachycardia.  Physical exam as above.  Labs with elevated WBC.  CT with intraabdominal fluid collection.  Seen by Dr. King at bedside.  Will admit to obs.  Rx abx.  Did bedside teaching.  All questions answered.  Patient acknowledges understanding.  Other:   I  have discussed this case with another health care provider.       <> Summary of the Discussion: PEDRITO King, admission                                 Clinical Impression:   Final diagnoses:  [R61] Diaphoresis  [R10.84] Generalized abdominal pain  [R18.8] Intraabdominal fluid collection (Primary)      Disposition:   Disposition: Placed in Observation  Condition: Stable    Level of Complexity:  High, level 5.                 Jayant Robert MD  11/07/19 1448

## 2019-11-07 NOTE — ED TRIAGE NOTES
Jose Rafael Campbell, a 59 y.o. male presents to the ED w/ complaint of whipple drain about an hour ago, RLQ pain rated 10/10 severity on pain scale with nausea. Pt diaphoretic. Denies fever, chills, CP, SOB.    Triage note:  Chief Complaint   Patient presents with    Abdominal Pain     Sent from Dr. King clinic for severe abdomina pain after having whipple drain pulled by staff. Took 2 percocets but still in lots of pain. + nausea.      Review of patient's allergies indicates:   Allergen Reactions    Codeine Other (See Comments)     headache    Aspirin Rash    Penicillins Rash     Past Medical History:   Diagnosis Date    Abdominal pain     Diarrhea     IPMN (intraductal papillary mucinous neoplasm)     Nausea     Pancreas cyst     Pancreatic duct dilated     Pancreatitis      Adult Physical Assessment  LOC: Jose Rafael Campbell, 59 y.o. male verified via two identifiers.  The patient is awake, alert, oriented and speaking appropriately at this time.  APPEARANCE: Patient resting comfortably and appears to be in no acute distress at this time. Patient is clean and well groomed, patient's clothing is properly fastened.  SKIN:The skin is warm and moist, color consistent with ethnicity, patient has normal skin turgor and moist mucus membranes, skin intact, no breakdown or brusing noted. Healing incision to midline abdomen, without erythema or drainage  MUSCULOSKELETAL: Patient moving all extremities well, no obvious swelling or deformities noted.  RESPIRATORY: Airway is open and patent, respirations are spontaneous, patient has a normal effort and increased rate, no accessory muscle use noted.  CARDIAC: Patient has a tachycardic rate and rhythm, no periphreal edema noted in any extremity, capillary refill < 3 seconds in all extremities  ABDOMEN: Soft and non tender to palpation, no abdominal distention noted. Bowel sounds present in all four quadrants. RLQ pain  NEUROLOGIC: Eyes open spontaneously, behavior  appropriate to situation, follows commands, facial expression symmetrical, bilateral hand grasp equal and even, purposeful motor response noted, normal sensation in all extremities when touched with a finger.

## 2019-11-07 NOTE — PROGRESS NOTES
Subjective:      Jose Rafael Campbell is a 59 y.o. year old male who presents to the Surgical Oncology Clinic on 11/07/2019 for follow-up from [place] on 10/25. Pt underwent the procedure for IPMN in the uncinate process. It had initially be evaluate and observed, however on recent imaging it had enlarged to 1.3cm. This, in conjunction with new onset pancreatitis and EUS with findings of mucin at the ampulla prompted surgical excision    Pts post-operative course was uncomplicated and he was discharged home on 11/2. He presents to clinic today prior to his previously scheduled appointment for evaluation of drainage around his RLQ drain site. Pt and spouse note he has overall done well since going home. He does report poor appetite and minimal PO intake in conjunction with a generalized feeling of tiredness, but his pain has been controlled without significant narcotic use, he is ambulatory, having BMs and is otherwise in good spirits. They note minimal output through the drain (<100cc of grey, thin, pancreatic necrosis appearing fluid) but has been having moderate chylous appearing output around drain    Drain pulled in clinic, no issues initially. Approx 5 min later patient found to be in excruciating abdominal pain. Vitals rechecked.  's/70's. Afebrile. Pain sharp, epigastric, unrelenting. Associated with nausea. Took 10mg oxycodone without relief after ~30min. Taken to the ED for evaluation    Vitals:    11/07/19 1000   BP: 111/83   Pulse: (!) 125   Temp: 98.3 °F (36.8 °C)        Patient Active Problem List   Diagnosis    Pancreas cyst       Current Outpatient Medications   Medication Sig Dispense Refill    acetaminophen (TYLENOL) 325 MG tablet Take 650 mg by mouth.      docusate sodium (COLACE) 50 MG capsule Take 1 capsule (50 mg total) by mouth daily as needed for Constipation.  0    indomethacin (INDOCIN) 25 MG capsule       omeprazole (PRILOSEC) 20 MG capsule Take 20 mg by mouth.       oxyCODONE-acetaminophen (PERCOCET) 5-325 mg per tablet Take 1 tablet by mouth every 4 (four) hours as needed for Pain. 25 tablet 0    simvastatin (ZOCOR) 40 MG tablet Take 40 mg by mouth.      sucralfate (CARAFATE) 100 mg/mL suspension Take 1 g by mouth.      vit B comp-C-FA-iron-vit E (STRESS FORMULA WITH IRON) 500 mg-400 mcg- 18 mg iron Tab Take 1 tablet by mouth.      vitamin D (VITAMIN D3) 1000 units Tab Take 1,000 Units by mouth.       No current facility-administered medications for this visit.        Review of Systems:  A 12 point ROS was conducted and was negative with the exceptions noted in the HPI    Objective:     Physical Exam:  Gen: NAD - after drain pull in severe pain, diaphoretic  CV: low grade tachycardia  Pulm: Tachypnic 30's  GI: soft, minimally tender but in significant distress. Drain site c/d/i. Midline wound well approximated. dermabond in place  Ext: WWP      Assessment:       Pt is a 58 yo M s/p Whipple for IPMN 10/25 with uncomplicated hospital course. Discharged 11/2. Leaking around drain, generalized myalgias and poor PO, otherwise recovering as expecting. After drain pulled in clinic with severe abdominal pain, nausea. Not controlled with PO meds. Taken to the ED for further evaluation    Plan:     - To ER for eval  - Will plan for basic labs (CBC, CMP, amylase/lipase)  - CT pancreas protocol  - Likely admit pending findings

## 2019-11-07 NOTE — ED NOTES
Patient on stretcher, Bed placed in low/locked position, side rails up x 2, call light is within reach of patient or family, Patient placed into position of comfort. Patient in NAD and offers no complaints at this time. Will continue to monitor.

## 2019-11-08 VITALS
WEIGHT: 130 LBS | RESPIRATION RATE: 12 BRPM | HEIGHT: 65 IN | SYSTOLIC BLOOD PRESSURE: 123 MMHG | OXYGEN SATURATION: 96 % | BODY MASS INDEX: 21.66 KG/M2 | DIASTOLIC BLOOD PRESSURE: 71 MMHG | HEART RATE: 107 BPM | TEMPERATURE: 98 F

## 2019-11-08 DIAGNOSIS — R18.8 INTRAABDOMINAL FLUID COLLECTION: ICD-10-CM

## 2019-11-08 DIAGNOSIS — K86.2 PANCREAS CYST: Primary | ICD-10-CM

## 2019-11-08 LAB
ALBUMIN SERPL BCP-MCNC: 2.6 G/DL (ref 3.5–5.2)
ALP SERPL-CCNC: 76 U/L (ref 55–135)
ALT SERPL W/O P-5'-P-CCNC: 24 U/L (ref 10–44)
ANION GAP SERPL CALC-SCNC: 8 MMOL/L (ref 8–16)
AST SERPL-CCNC: 15 U/L (ref 10–40)
BASOPHILS # BLD AUTO: 0.11 K/UL (ref 0–0.2)
BASOPHILS NFR BLD: 0.7 % (ref 0–1.9)
BILIRUB SERPL-MCNC: 0.3 MG/DL (ref 0.1–1)
BILIRUB UR QL STRIP: NEGATIVE
BUN SERPL-MCNC: 14 MG/DL (ref 8–23)
CALCIUM SERPL-MCNC: 8.8 MG/DL (ref 8.7–10.5)
CHLORIDE SERPL-SCNC: 105 MMOL/L (ref 95–110)
CLARITY UR REFRACT.AUTO: ABNORMAL
CO2 SERPL-SCNC: 22 MMOL/L (ref 23–29)
COLOR UR AUTO: YELLOW
CREAT SERPL-MCNC: 0.7 MG/DL (ref 0.5–1.4)
DIFFERENTIAL METHOD: ABNORMAL
EOSINOPHIL # BLD AUTO: 0.4 K/UL (ref 0–0.5)
EOSINOPHIL NFR BLD: 2.2 % (ref 0–8)
ERYTHROCYTE [DISTWIDTH] IN BLOOD BY AUTOMATED COUNT: 13.3 % (ref 11.5–14.5)
EST. GFR  (AFRICAN AMERICAN): >60 ML/MIN/1.73 M^2
EST. GFR  (NON AFRICAN AMERICAN): >60 ML/MIN/1.73 M^2
GLUCOSE SERPL-MCNC: 83 MG/DL (ref 70–110)
GLUCOSE UR QL STRIP: NEGATIVE
HCT VFR BLD AUTO: 36.3 % (ref 40–54)
HGB BLD-MCNC: 11.7 G/DL (ref 14–18)
HGB UR QL STRIP: NEGATIVE
IMM GRANULOCYTES # BLD AUTO: 0.25 K/UL (ref 0–0.04)
IMM GRANULOCYTES NFR BLD AUTO: 1.6 % (ref 0–0.5)
KETONES UR QL STRIP: ABNORMAL
LEUKOCYTE ESTERASE UR QL STRIP: NEGATIVE
LYMPHOCYTES # BLD AUTO: 1.3 K/UL (ref 1–4.8)
LYMPHOCYTES NFR BLD: 8 % (ref 18–48)
MAGNESIUM SERPL-MCNC: 1.8 MG/DL (ref 1.6–2.6)
MCH RBC QN AUTO: 30 PG (ref 27–31)
MCHC RBC AUTO-ENTMCNC: 32.2 G/DL (ref 32–36)
MCV RBC AUTO: 93 FL (ref 82–98)
MONOCYTES # BLD AUTO: 1.6 K/UL (ref 0.3–1)
MONOCYTES NFR BLD: 9.8 % (ref 4–15)
NEUTROPHILS # BLD AUTO: 12.5 K/UL (ref 1.8–7.7)
NEUTROPHILS NFR BLD: 77.7 % (ref 38–73)
NITRITE UR QL STRIP: NEGATIVE
NRBC BLD-RTO: 0 /100 WBC
PH UR STRIP: 5 [PH] (ref 5–8)
PHOSPHATE SERPL-MCNC: 3.7 MG/DL (ref 2.7–4.5)
PLATELET # BLD AUTO: 680 K/UL (ref 150–350)
PMV BLD AUTO: 8.6 FL (ref 9.2–12.9)
POTASSIUM SERPL-SCNC: 4.4 MMOL/L (ref 3.5–5.1)
PROT SERPL-MCNC: 6 G/DL (ref 6–8.4)
PROT UR QL STRIP: NEGATIVE
RBC # BLD AUTO: 3.9 M/UL (ref 4.6–6.2)
SODIUM SERPL-SCNC: 135 MMOL/L (ref 136–145)
SP GR UR STRIP: 1.02 (ref 1–1.03)
URN SPEC COLLECT METH UR: ABNORMAL
WBC # BLD AUTO: 16.06 K/UL (ref 3.9–12.7)

## 2019-11-08 PROCEDURE — 25000003 PHARM REV CODE 250: Performed by: NURSE PRACTITIONER

## 2019-11-08 PROCEDURE — 25000003 PHARM REV CODE 250: Performed by: STUDENT IN AN ORGANIZED HEALTH CARE EDUCATION/TRAINING PROGRAM

## 2019-11-08 PROCEDURE — 36415 COLL VENOUS BLD VENIPUNCTURE: CPT

## 2019-11-08 PROCEDURE — 83735 ASSAY OF MAGNESIUM: CPT

## 2019-11-08 PROCEDURE — 84100 ASSAY OF PHOSPHORUS: CPT

## 2019-11-08 PROCEDURE — 81003 URINALYSIS AUTO W/O SCOPE: CPT

## 2019-11-08 PROCEDURE — 94761 N-INVAS EAR/PLS OXIMETRY MLT: CPT

## 2019-11-08 PROCEDURE — G0378 HOSPITAL OBSERVATION PER HR: HCPCS

## 2019-11-08 PROCEDURE — 85025 COMPLETE CBC W/AUTO DIFF WBC: CPT

## 2019-11-08 PROCEDURE — 80053 COMPREHEN METABOLIC PANEL: CPT

## 2019-11-08 RX ORDER — METRONIDAZOLE 500 MG/1
500 TABLET ORAL EVERY 8 HOURS
Status: DISCONTINUED | OUTPATIENT
Start: 2019-11-08 | End: 2019-11-08 | Stop reason: HOSPADM

## 2019-11-08 RX ORDER — CIPROFLOXACIN 500 MG/1
500 TABLET ORAL EVERY 12 HOURS
Status: DISCONTINUED | OUTPATIENT
Start: 2019-11-08 | End: 2019-11-08 | Stop reason: HOSPADM

## 2019-11-08 RX ORDER — LANOLIN ALCOHOL/MO/W.PET/CERES
800 CREAM (GRAM) TOPICAL ONCE
Status: COMPLETED | OUTPATIENT
Start: 2019-11-08 | End: 2019-11-08

## 2019-11-08 RX ORDER — SIMVASTATIN 20 MG/1
40 TABLET, FILM COATED ORAL NIGHTLY
Status: DISCONTINUED | OUTPATIENT
Start: 2019-11-08 | End: 2019-11-08 | Stop reason: HOSPADM

## 2019-11-08 RX ORDER — OXYCODONE AND ACETAMINOPHEN 5; 325 MG/1; MG/1
1 TABLET ORAL EVERY 6 HOURS PRN
Qty: 12 TABLET | Refills: 0 | Status: ON HOLD | OUTPATIENT
Start: 2019-11-08 | End: 2021-07-13 | Stop reason: HOSPADM

## 2019-11-08 RX ORDER — METRONIDAZOLE 500 MG/1
500 TABLET ORAL EVERY 8 HOURS
Qty: 7 TABLET | Refills: 0 | Status: ON HOLD | OUTPATIENT
Start: 2019-11-08 | End: 2021-07-13 | Stop reason: HOSPADM

## 2019-11-08 RX ORDER — CIPROFLOXACIN 500 MG/1
500 TABLET ORAL EVERY 12 HOURS
Qty: 14 TABLET | Refills: 0 | Status: SHIPPED | OUTPATIENT
Start: 2019-11-08 | End: 2019-11-15

## 2019-11-08 RX ADMIN — Medication 800 MG: at 11:11

## 2019-11-08 RX ADMIN — OXYCODONE HYDROCHLORIDE AND ACETAMINOPHEN 1 TABLET: 10; 325 TABLET ORAL at 12:11

## 2019-11-08 RX ADMIN — CIPROFLOXACIN 500 MG: 500 TABLET, FILM COATED ORAL at 09:11

## 2019-11-08 RX ADMIN — METRONIDAZOLE 500 MG: 500 TABLET ORAL at 01:11

## 2019-11-08 RX ADMIN — OXYCODONE HYDROCHLORIDE AND ACETAMINOPHEN 1 TABLET: 10; 325 TABLET ORAL at 06:11

## 2019-11-08 NOTE — PLAN OF CARE
11/08/19 1429   Final Note   Assessment Type Final Discharge Note   Anticipated Discharge Disposition Home   Right Care Referral Info   Post Acute Recommendation No Care     Pt discharged home with no needs.    Davida Villaseñor LMSW  [facility]  [id number]

## 2019-11-08 NOTE — PLAN OF CARE
Patient progressing toward goals. Spouse remains at bedside w/ pain controlled during PM shift. RN will continue to monitor.

## 2019-11-08 NOTE — NURSING
Pt due to be discharged. Wife in room as patient's ride home. Meds picked up by wife. Peripheral IV removed. Pt teaching complete. Pt wheeled from floor by transport.

## 2019-11-08 NOTE — PLAN OF CARE
Plan of care reviewed with pt: AAOx4, on RA, VS stable with HR in the 100s-110s. Transferred from ED this evening. No complains of pain or nausea. Old midline incision CDI , the drain removal site with gauze and tape on CDI. Voided with no difficulty. Last bowel movement was yesterday morning. Call light in reach. WCTM  Update: confirmed with MD on-call that UA and urine culture order from this morning DON'T need to be collected anymore

## 2019-11-08 NOTE — PROGRESS NOTES
[facility]  General Surgery  Progress Note    Subjective:     History of Present Illness:  No notes on file    Post-Op Info:  * No surgery found *         Interval History:   Adequate pain control  Tolerated a regular diet  No nausea or vomit  No fever. HR: 100-125 (as previous hospitalization)     Medications:  Continuous Infusions:  Scheduled Meds:  PRN Meds:acetaminophen, ondansetron, oxyCODONE-acetaminophen, oxyCODONE-acetaminophen, sodium chloride 0.9%     Review of patient's allergies indicates:   Allergen Reactions    Codeine Other (See Comments)     headache    Aspirin Rash    Penicillins Rash     Objective:     Vital Signs (Most Recent):  Temp: 98 °F (36.7 °C) (11/08/19 0354)  Pulse: (!) 112 (11/08/19 0354)  Resp: 18 (11/08/19 0354)  BP: 114/71 (11/08/19 0354)  SpO2: (!) 93 % (11/08/19 0354) Vital Signs (24h Range):  Temp:  [96.8 °F (36 °C)-98.4 °F (36.9 °C)] 98 °F (36.7 °C)  Pulse:  [100-125] 112  Resp:  [18-20] 18  SpO2:  [93 %-98 %] 93 %  BP: (103-137)/(65-85) 114/71     Weight: 59 kg (130 lb)  Body mass index is 21.63 kg/m².    Intake/Output - Last 3 Shifts       11/06 0700 - 11/07 0659 11/07 0700 - 11/08 0659    IV Piggyback  500    Total Intake(mL/kg)  500 (8.5)    Urine (mL/kg/hr)  200    Stool  0    Total Output  200    Net  +300          Urine Occurrence  1 x    Stool Occurrence  0 x          Physical Exam   Constitutional: He is oriented to person, place, and time. He appears well-developed.   HENT:   Head: Normocephalic.   Eyes: Pupils are equal, round, and reactive to light. Conjunctivae are normal.   Neck: Normal range of motion. Neck supple.   Cardiovascular: Normal rate, regular rhythm and normal heart sounds.   Pulmonary/Chest: Effort normal. No respiratory distress.   Abdominal: Soft. Bowel sounds are normal. He exhibits no distension. There is no tenderness.   Incisions c/d/i   Musculoskeletal: Normal range of motion.   Neurological: He is alert and oriented to  person, place, and time.       Significant Labs:  CBC:   Recent Labs   Lab 11/08/19  0351   WBC 16.06*   RBC 3.90*   HGB 11.7*   HCT 36.3*   *   MCV 93   MCH 30.0   MCHC 32.2     BMP:   Recent Labs   Lab 11/08/19  0351   GLU 83   *   K 4.4      CO2 22*   BUN 14   CREATININE 0.7   CALCIUM 8.8   MG 1.8       Significant Diagnostics:  I have reviewed and interpreted all pertinent imaging results/findings within the past 24 hours.    Assessment/Plan:     Intraabdominal fluid collection     Pt is a 58 yo M s/p Whipple for IPMN 10/25 with uncomplicated hospital course. Discharged 11/2. After drain pulled in clinic with severe abdominal pain, nausea. CT scan showed small amount of fluid around the surgical bed, no abscess.      - Will change antibiotics to PO   - Cipro and Flagyl (Allergy to penicillins)   - PRN Percocet for pain control  - Regular diet  - SDCs, Ambulate    Dispo: Today         Humphrey Ramos MD  General Surgery  [facility]

## 2019-11-08 NOTE — PLAN OF CARE
11/08/19 1340   Final Note   Assessment Type Final Discharge Note   Anticipated Discharge Disposition Home   What phone number can be called within the next 1-3 days to see how you are doing after discharge?   ([phone])   Hospital Follow Up  Appt(s) scheduled? Yes   Discharge plans and expectations educations in teach back method with documentation complete? Yes   Right Care Referral Info   Post Acute Recommendation   (Incomplete)

## 2019-11-08 NOTE — ASSESSMENT & PLAN NOTE
Pt is a 60 yo M s/p Whipple for IPMN 10/25 with uncomplicated hospital course. Discharged 11/2. After drain pulled in clinic with severe abdominal pain, nausea. CT scan showed small amount of fluid around the surgical bed, no abscess.      - Will change antibiotics to PO Augmentin   - PRN Percocet for pain control  - Regular diet  - SDCs, Ambulate    Dispo: Today

## 2019-11-08 NOTE — SUBJECTIVE & OBJECTIVE
Interval History:   Adequate pain control  Tolerated a regular diet  No nausea or vomit  No fever. HR: 100-125 (as previous hospitalization)     Medications:  Continuous Infusions:  Scheduled Meds:  PRN Meds:acetaminophen, ondansetron, oxyCODONE-acetaminophen, oxyCODONE-acetaminophen, sodium chloride 0.9%     Review of patient's allergies indicates:   Allergen Reactions    Codeine Other (See Comments)     headache    Aspirin Rash    Penicillins Rash     Objective:     Vital Signs (Most Recent):  Temp: 98 °F (36.7 °C) (11/08/19 0354)  Pulse: (!) 112 (11/08/19 0354)  Resp: 18 (11/08/19 0354)  BP: 114/71 (11/08/19 0354)  SpO2: (!) 93 % (11/08/19 0354) Vital Signs (24h Range):  Temp:  [96.8 °F (36 °C)-98.4 °F (36.9 °C)] 98 °F (36.7 °C)  Pulse:  [100-125] 112  Resp:  [18-20] 18  SpO2:  [93 %-98 %] 93 %  BP: (103-137)/(65-85) 114/71     Weight: 59 kg (130 lb)  Body mass index is 21.63 kg/m².    Intake/Output - Last 3 Shifts       11/06 0700 - 11/07 0659 11/07 0700 - 11/08 0659    IV Piggyback  500    Total Intake(mL/kg)  500 (8.5)    Urine (mL/kg/hr)  200    Stool  0    Total Output  200    Net  +300          Urine Occurrence  1 x    Stool Occurrence  0 x          Physical Exam   Constitutional: He is oriented to person, place, and time. He appears well-developed.   HENT:   Head: Normocephalic.   Eyes: Pupils are equal, round, and reactive to light. Conjunctivae are normal.   Neck: Normal range of motion. Neck supple.   Cardiovascular: Normal rate, regular rhythm and normal heart sounds.   Pulmonary/Chest: Effort normal. No respiratory distress.   Abdominal: Soft. Bowel sounds are normal. He exhibits no distension. There is no tenderness.   Incisions c/d/i   Musculoskeletal: Normal range of motion.   Neurological: He is alert and oriented to person, place, and time.       Significant Labs:  CBC:   Recent Labs   Lab 11/08/19  0351   WBC 16.06*   RBC 3.90*   HGB 11.7*   HCT 36.3*   *   MCV 93   MCH 30.0   MCHC  32.2     BMP:   Recent Labs   Lab 11/08/19  0351   GLU 83   *   K 4.4      CO2 22*   BUN 14   CREATININE 0.7   CALCIUM 8.8   MG 1.8       Significant Diagnostics:  I have reviewed and interpreted all pertinent imaging results/findings within the past 24 hours.

## 2019-11-08 NOTE — PLAN OF CARE
Patient lives in a house w/his spouse. Patient is independent & agile. No needs are determined. He reports his wife is picking up his prescriptions downstairs.    Discussed w/him, the , together w/, roles-we follow him, while he is in the hospital, to assist w/discharge needs, should he have any. Currently: He has no needs. He verbalized his understanding.        11/08/19 1250   Discharge Assessment   Assessment Type Discharge Planning Assessment   Confirmed/corrected address and phone number on facesheet? Yes   Assessment information obtained from? Patient;Medical Record   Expected Length of Stay (days)   (1)   Communicated expected length of stay with patient/caregiver yes  (Per MD)   Prior to hospitilization cognitive status: Alert/Oriented;No Deficits   Prior to hospitalization functional status: Independent   Current cognitive status: Alert/Oriented;No Deficits   Current Functional Status: Independent   Facility Arrived From:   (N/A)   Lives With spouse   Able to Return to Prior Arrangements yes   Is patient able to care for self after discharge? Yes   Who are your caregiver(s) and their phone number(s)?   (Tiffany Campbell Spouse     [phone] )   Patient's perception of discharge disposition home or selfcare   Readmission Within the Last 30 Days other (see comments)  (10/25/19 S/p Mehul)   Patient currently being followed by outpatient case management? No   Patient currently receives any other outside agency services? No   Equipment Currently Used at Home none   Do you have any problems affording any of your prescribed medications? No   Is the patient taking medications as prescribed? yes   Does the patient have transportation home? Yes   Transportation Anticipated family or friend will provide   Dialysis Name and Scheduled days   (N/A)   Does the patient receive services at the Coumadin Clinic? No   Discharge Plan A Home with family   Discharge Plan B Home with family   DME  Needed Upon Discharge  none   Patient/Family in Agreement with Plan yes

## 2019-11-08 NOTE — DISCHARGE SUMMARY
[facility]  General Surgery  Discharge Summary      Patient Name: Jose Rafael Campbell  MRN: [id number]  Admission Date: 11/7/2019  Hospital Length of Stay: 0 days  Discharge Date and Time:  11/08/2019 1:25 PM  Attending Physician: Paul King MD   Discharging Provider: Kaylee Pillai NP  Primary Care Provider: Primary Doctor Indu     HPI: Chief Complaint/Reason for Admission:   Abdominal pain after drain removal      History of Present Illness:  Jose Rafael Campbell is a 59 y.o. year old male who presents to the Surgical Oncology Clinic on 11/07/2019 for follow-up from [place] on 10/25. Pt underwent the procedure for IPMN in the uncinate process. It had initially be evaluate and observed, however on recent imaging it had enlarged to 1.3cm. This, in conjunction with new onset pancreatitis and EUS with findings of mucin at the ampulla prompted surgical excision    Pts post-operative course was uncomplicated and he was discharged home on 11/2. He presents to clinic today prior to his previously scheduled appointment for evaluation of drainage around his RLQ drain site. Pt and spouse note he has overall done well since going home. He does report poor appetite and minimal PO intake in conjunction with a generalized feeling of tiredness, but his pain has been controlled without significant narcotic use, he is ambulatory, having BMs and is otherwise in good spirits. They note minimal output through the drain (<100cc of grey, thin, pancreatic necrosis appearing fluid) but has been having moderate chylous appearing output around drain     Drain pulled in clinic, no issues initially. Approx 5 min later patient found to be in excruciating abdominal pain. Vitals rechecked.  's/70's. Afebrile. Pain sharp, epigastric, unrelenting. Associated with nausea. Took 10mg oxycodone without relief after ~30min. Taken to the ED for evaluation.      At ED pain was controlled with IV narcotics, adequate results.  CT Scan did not showed any abscess. Patient was started in antibiotics.     * No surgery found *     Hospital Course: Mr. Campbell is a 60 yo M s/p Whipple for IPMN 10/25 with uncomplicated hospital course. Discharged 11/2/19. After drain pulled in clinic on 11/7/29 patient with severe abdominal pain, nausea. CT scan showed small amount of fluid around the surgical bed, no abscess. Patient discharged home on PO cipro and flagyl x 7 days. Patient's pain is now controlled with PO pain medication.  The patient is tolerating a regular diet.  He is voiding and ambulating without difficulty.  Patient with no complaints of nausea, vomiting, chest pain or shortness of breath.  His vital signs stable. He is afebrile. He is positive for flatus and positive for bowel sounds.  Physical Exam   Constitutional: He is oriented to person, place, and time. He appears well-developed.   HENT:   Head: Normocephalic.   Eyes: Pupils are equal, round, and reactive to light. Conjunctivae are normal.   Neck: Normal range of motion. Neck supple.   Cardiovascular: Normal rate, regular rhythm and normal heart sounds.   Pulmonary/Chest: Effort normal. No respiratory distress.   Abdominal: Soft. Bowel sounds are normal. He exhibits no distension. There is no tenderness.   Incisions c/d/i   Musculoskeletal: Normal range of motion.   Neurological: He is alert and oriented to person, place, and time.      Consults:  PT    Significant Diagnostic Studies: Labs:   CMP   Recent Labs   Lab 11/07/19  1145 11/08/19  0351    135*   K 4.9 4.4    105   CO2 24 22*   * 83   BUN 18 14   CREATININE 0.9 0.7   CALCIUM 9.4 8.8   PROT 7.2 6.0   ALBUMIN 3.2* 2.6*   BILITOT 0.3 0.3   ALKPHOS 94 76   AST 22 15   ALT 35 24   ANIONGAP 13 8   ESTGFRAFRICA >60.0 >60.0   EGFRNONAA >60.0 >60.0    and CBC   Recent Labs   Lab 11/07/19  1145 11/08/19  0351   WBC 15.70* 16.06*   HGB 13.1* 11.7*   HCT 40.9 36.3*   * 680*       Pending Diagnostic Studies:      None        Final Active Diagnoses:    Diagnosis Date Noted POA    PRINCIPAL PROBLEM:  Intraabdominal fluid collection [R18.8] 11/07/2019 Yes      Problems Resolved During this Admission:      Discharged Condition: good    Disposition: Home or Self Care    Follow Up:  Follow-up Information     Paul King MD On 11/21/2019.    Specialties:  General Surgery, Surgical Oncology  Why:  For wound re-check: 11/21/19 at 10:00 AM  Contact information:  [address]  [facility] [address]  [phone]                 Patient Instructions:      Diet Adult Regular     No driving until:   Scheduling Instructions: Off pain medications     Other restrictions (specify):   Scheduling Instructions: Do not lift more than 10 pounds for 4 more weeks     Notify your health care provider if you experience any of the following:  increased confusion or weakness     Notify your health care provider if you experience any of the following:  persistent dizziness, light-headedness, or visual disturbances     Notify your health care provider if you experience any of the following:  worsening rash     Notify your health care provider if you experience any of the following:  severe persistent headache     Notify your health care provider if you experience any of the following:  difficulty breathing or increased cough     Notify your health care provider if you experience any of the following:  redness, tenderness, or signs of infection (pain, swelling, redness, odor or green/yellow discharge around incision site)     Notify your health care provider if you experience any of the following:  severe uncontrolled pain     Notify your health care provider if you experience any of the following:  persistent nausea and vomiting or diarrhea     Notify your health care provider if you experience any of the following:  temperature >100.4     Medications:  Reconciled Home Medications:      Medication List      START taking these medications     ciprofloxacin HCl 500 MG tablet  Commonly known as:  CIPRO  Take 1 tablet (500 mg total) by mouth every 12 (twelve) hours. for 7 days     metroNIDAZOLE 500 MG tablet  Commonly known as:  FLAGYL  Take 1 tablet (500 mg total) by mouth every 8 (eight) hours.        CHANGE how you take these medications    oxyCODONE-acetaminophen 5-325 mg per tablet  Commonly known as:  PERCOCET  Take 1 tablet by mouth every 6 (six) hours as needed for Pain.  What changed:  when to take this        CONTINUE taking these medications    acetaminophen 325 MG tablet  Commonly known as:  TYLENOL  Take 650 mg by mouth.     CARAFATE 100 mg/mL suspension  Generic drug:  sucralfate  Take 1 g by mouth.     docusate sodium 50 MG capsule  Commonly known as:  COLACE  Take 1 capsule (50 mg total) by mouth daily as needed for Constipation.     indomethacin 25 MG capsule  Commonly known as:  INDOCIN     omeprazole 20 MG capsule  Commonly known as:  PRILOSEC  Take 20 mg by mouth.     STRESS FORMULA WITH IRON 500 mg-400 mcg- 18 mg iron Tab  Generic drug:  vit B comp-C-FA-iron-vit E  Take 1 tablet by mouth.     vitamin D 1000 units Tab  Commonly known as:  VITAMIN D3  Take 1,000 Units by mouth.     ZOCOR 40 MG tablet  Generic drug:  simvastatin  Take 40 mg by mouth.            Kaylee Pillai NP  General Surgery  [facility]

## 2019-11-21 ENCOUNTER — OFFICE VISIT (OUTPATIENT)
Dept: SURGERY | Facility: CLINIC | Age: 59
End: 2019-11-21
Payer: OTHER GOVERNMENT

## 2019-11-21 VITALS
HEIGHT: 65 IN | TEMPERATURE: 97 F | RESPIRATION RATE: 18 BRPM | HEART RATE: 102 BPM | DIASTOLIC BLOOD PRESSURE: 77 MMHG | BODY MASS INDEX: 21.71 KG/M2 | SYSTOLIC BLOOD PRESSURE: 104 MMHG | WEIGHT: 130.31 LBS

## 2019-11-21 DIAGNOSIS — D49.0 IPMN (INTRADUCTAL PAPILLARY MUCINOUS NEOPLASM): Primary | ICD-10-CM

## 2019-11-21 PROCEDURE — 99024 POSTOP FOLLOW-UP VISIT: CPT | Mod: ,,, | Performed by: SURGERY

## 2019-11-21 PROCEDURE — 99024 PR POST-OP FOLLOW-UP VISIT: ICD-10-PCS | Mod: ,,, | Performed by: SURGERY

## 2019-11-21 PROCEDURE — 99999 PR PBB SHADOW E&M-EST. PATIENT-LVL III: CPT | Mod: PBBFAC,,, | Performed by: SURGERY

## 2019-11-21 PROCEDURE — 99213 OFFICE O/P EST LOW 20 MIN: CPT | Mod: PBBFAC | Performed by: SURGERY

## 2019-11-21 PROCEDURE — 99999 PR PBB SHADOW E&M-EST. PATIENT-LVL III: ICD-10-PCS | Mod: PBBFAC,,, | Performed by: SURGERY

## 2019-11-21 NOTE — Clinical Note
Dilshad Way, can we get Mr. Campbell on the next cyst conference list to talk about how we're doing surveillance on patients with resected IPMN?

## 2019-11-21 NOTE — PROGRESS NOTES
Subjective:      Jose Rafael Campbell is a 59 y.o. year old male who presents to the Surgical Oncology Clinic on 11/21/2019 for follow-up from [place] on 10/25. Pt underwent the procedure for IPMN in the uncinate process. It had initially be evaluate and observed, however on recent imaging it had enlarged to 1.3cm. This, in conjunction with new onset pancreatitis and EUS with findings of mucin at the ampulla prompted surgical excision    Pts post-operative course was uncomplicated and he was discharged home on 11/2. He was admitted after his last clinic visit when he began experiencing excruciating pain after having his drain pulled. He was kept overnight and did well. CT scan with no acute processes or findings. His pain remains well controlled at home. He is tolerating a regular diet. He is having normal bowel function. He denies any fevers or chills. He is walking every day.     Vitals:    11/21/19 0955   BP: 104/77   Pulse: 102   Resp: 18   Temp: 97.2 °F (36.2 °C)        Patient Active Problem List   Diagnosis    Pancreas cyst    Intraabdominal fluid collection       Current Outpatient Medications   Medication Sig Dispense Refill    acetaminophen (TYLENOL) 325 MG tablet Take 650 mg by mouth.      omeprazole (PRILOSEC) 20 MG capsule Take 20 mg by mouth.      oxyCODONE-acetaminophen (PERCOCET) 5-325 mg per tablet Take 1 tablet by mouth every 6 (six) hours as needed for Pain. 12 tablet 0    simvastatin (ZOCOR) 40 MG tablet Take 40 mg by mouth.      sucralfate (CARAFATE) 100 mg/mL suspension Take 1 g by mouth.      vit B comp-C-FA-iron-vit E (STRESS FORMULA WITH IRON) 500 mg-400 mcg- 18 mg iron Tab Take 1 tablet by mouth.      vitamin D (VITAMIN D3) 1000 units Tab Take 1,000 Units by mouth.      indomethacin (INDOCIN) 25 MG capsule       metroNIDAZOLE (FLAGYL) 500 MG tablet Take 1 tablet (500 mg total) by mouth every 8 (eight) hours. (Patient not taking: Reported on 11/21/2019) 7 tablet 0     No current  facility-administered medications for this visit.        Review of Systems:  A 12 point ROS was conducted and was negative with the exceptions noted in the HPI    Objective:     Physical Exam:  Gen: NAD, doing well today  CV: Regular rate and rhythm  Pulm: Comfortable on room air  GI: soft, non-tender to palpation. Incisions healing well  Ext: WWP      Assessment:       Pt is a 60 yo M s/p Whipple for IPMN 10/25 with uncomplicated hospital course. Discharged 11/2. Hospitalized after last follow up visit for pain associated with drain removal that has resolved. He is currently doing well    Plan:     - Ok to follow up in GI clinic for continued surveillance of IPMN  - Follow up with us in first week of January for one last check

## 2020-01-02 ENCOUNTER — TELEPHONE (OUTPATIENT)
Dept: SURGERY | Facility: CLINIC | Age: 60
End: 2020-01-02

## 2020-01-02 ENCOUNTER — OFFICE VISIT (OUTPATIENT)
Dept: SURGERY | Facility: CLINIC | Age: 60
End: 2020-01-02
Payer: OTHER GOVERNMENT

## 2020-01-02 VITALS
WEIGHT: 133.63 LBS | HEIGHT: 65 IN | BODY MASS INDEX: 22.26 KG/M2 | SYSTOLIC BLOOD PRESSURE: 99 MMHG | HEART RATE: 74 BPM | DIASTOLIC BLOOD PRESSURE: 70 MMHG | TEMPERATURE: 97 F

## 2020-01-02 DIAGNOSIS — D49.0 IPMN (INTRADUCTAL PAPILLARY MUCINOUS NEOPLASM): Primary | ICD-10-CM

## 2020-01-02 PROCEDURE — 99213 OFFICE O/P EST LOW 20 MIN: CPT | Mod: PBBFAC | Performed by: SURGERY

## 2020-01-02 PROCEDURE — 99999 PR PBB SHADOW E&M-EST. PATIENT-LVL III: CPT | Mod: PBBFAC,,, | Performed by: SURGERY

## 2020-01-02 PROCEDURE — 99999 PR PBB SHADOW E&M-EST. PATIENT-LVL III: ICD-10-PCS | Mod: PBBFAC,,, | Performed by: SURGERY

## 2020-01-02 PROCEDURE — 99024 PR POST-OP FOLLOW-UP VISIT: ICD-10-PCS | Mod: ,,, | Performed by: SURGERY

## 2020-01-02 PROCEDURE — 99024 POSTOP FOLLOW-UP VISIT: CPT | Mod: ,,, | Performed by: SURGERY

## 2020-01-02 NOTE — TELEPHONE ENCOUNTER
----- Message from Alvarez Montanez sent at 1/2/2020 12:17 PM CST -----  Contact: Jina ([facility])  Staff Message     Caller name: Jina     Reason for call: Calling to speak with vaishali Seth the date changed on the pt back to duty certificate.        Communication Preference: [phone]    Additional Information:

## 2020-01-02 NOTE — PROGRESS NOTES
Subjective:      Jose Rafael Campbell is a 59 y.o. year old male who presents to the Surgical Oncology Clinic on 01/02/2020 for follow-up from [place] on 10/25. Pt underwent the procedure for IPMN in the uncinate process. It had initially be evaluate and observed, however on recent imaging it had enlarged to 1.3cm. This, in conjunction with new onset pancreatitis and EUS with findings of mucin at the ampulla prompted surgical excision    Pts post-operative course was uncomplicated and he was discharged home on 11/2. He had one brief readmission for pain that resolved on its own. He has since done well. He is tolerating a regular diet and having normal bowel function. He denies any weight loss. He is feeling well and wishes to go back to work.     Vitals:    01/02/20 0912   BP: 99/70   Pulse: 74   Temp: 97.3 °F (36.3 °C)        Patient Active Problem List   Diagnosis    Pancreas cyst    Intraabdominal fluid collection       Current Outpatient Medications   Medication Sig Dispense Refill    acetaminophen (TYLENOL) 325 MG tablet Take 650 mg by mouth.      indomethacin (INDOCIN) 25 MG capsule       metroNIDAZOLE (FLAGYL) 500 MG tablet Take 1 tablet (500 mg total) by mouth every 8 (eight) hours. 7 tablet 0    omeprazole (PRILOSEC) 20 MG capsule Take 20 mg by mouth.      oxyCODONE-acetaminophen (PERCOCET) 5-325 mg per tablet Take 1 tablet by mouth every 6 (six) hours as needed for Pain. 12 tablet 0    simvastatin (ZOCOR) 40 MG tablet Take 40 mg by mouth.      sucralfate (CARAFATE) 100 mg/mL suspension Take 1 g by mouth.      vit B comp-C-FA-iron-vit E (STRESS FORMULA WITH IRON) 500 mg-400 mcg- 18 mg iron Tab Take 1 tablet by mouth.      vitamin D (VITAMIN D3) 1000 units Tab Take 1,000 Units by mouth.       No current facility-administered medications for this visit.        Review of Systems:  A 12 point ROS was conducted and was negative with the exceptions noted in the HPI    Objective:     Physical Exam:  Gen: NAD,  doing well today  CV: Regular rate and rhythm  Pulm: Comfortable on room air  GI: soft, non-tender to palpation. Incisions well healed  Ext: WWP      Assessment:       Pt is a 60 yo M s/p Whipple for IPMN 10/25 with uncomplicated hospital course. Discharged 11/2. Hospitalized after last follow up visit for pain associated with drain removal that has resolved. He is currently doing well    Plan:     - Ok to follow up with his gastroenterologist for yearly surveillance MRCP's.  Will send a note to Dr. Seaman's office.  - Follow up with us PRN

## 2020-02-26 ENCOUNTER — APPOINTMENT (RX ONLY)
Dept: URBAN - METROPOLITAN AREA CLINIC 158 | Facility: CLINIC | Age: 60
Setting detail: DERMATOLOGY
End: 2020-02-26

## 2020-02-26 DIAGNOSIS — L81.4 OTHER MELANIN HYPERPIGMENTATION: ICD-10-CM

## 2020-02-26 DIAGNOSIS — L82.1 OTHER SEBORRHEIC KERATOSIS: ICD-10-CM

## 2020-02-26 DIAGNOSIS — L57.0 ACTINIC KERATOSIS: ICD-10-CM

## 2020-02-26 PROCEDURE — ? COUNSELING

## 2020-02-26 PROCEDURE — 17003 DESTRUCT PREMALG LES 2-14: CPT

## 2020-02-26 PROCEDURE — 99213 OFFICE O/P EST LOW 20 MIN: CPT | Mod: 25

## 2020-02-26 PROCEDURE — 17000 DESTRUCT PREMALG LESION: CPT

## 2020-02-26 PROCEDURE — ? LIQUID NITROGEN

## 2020-02-26 ASSESSMENT — LOCATION DETAILED DESCRIPTION DERM
LOCATION DETAILED: RIGHT CENTRAL TEMPLE
LOCATION DETAILED: RIGHT CENTRAL MANDIBULAR CHEEK
LOCATION DETAILED: LEFT PROXIMAL PRETIBIAL REGION
LOCATION DETAILED: LEFT ANTERIOR DISTAL UPPER ARM
LOCATION DETAILED: LEFT INFERIOR CENTRAL MALAR CHEEK
LOCATION DETAILED: RIGHT FOREHEAD
LOCATION DETAILED: LEFT VENTRAL PROXIMAL FOREARM
LOCATION DETAILED: LEFT CENTRAL MALAR CHEEK

## 2020-02-26 ASSESSMENT — LOCATION ZONE DERM
LOCATION ZONE: LEG
LOCATION ZONE: ARM
LOCATION ZONE: FACE

## 2020-02-26 ASSESSMENT — LOCATION SIMPLE DESCRIPTION DERM
LOCATION SIMPLE: RIGHT TEMPLE
LOCATION SIMPLE: LEFT FOREARM
LOCATION SIMPLE: RIGHT FOREHEAD
LOCATION SIMPLE: LEFT UPPER ARM
LOCATION SIMPLE: RIGHT CHEEK
LOCATION SIMPLE: LEFT CHEEK
LOCATION SIMPLE: LEFT PRETIBIAL REGION

## 2021-03-08 ENCOUNTER — TELEPHONE (OUTPATIENT)
Dept: SURGERY | Facility: CLINIC | Age: 61
End: 2021-03-08

## 2021-03-18 ENCOUNTER — OFFICE VISIT (OUTPATIENT)
Dept: SURGERY | Facility: CLINIC | Age: 61
End: 2021-03-18
Payer: OTHER GOVERNMENT

## 2021-03-18 ENCOUNTER — HOSPITAL ENCOUNTER (OUTPATIENT)
Dept: RADIOLOGY | Facility: HOSPITAL | Age: 61
Discharge: HOME | End: 2021-03-18
Attending: SURGERY
Payer: OTHER GOVERNMENT

## 2021-03-18 VITALS
BODY MASS INDEX: 22.13 KG/M2 | DIASTOLIC BLOOD PRESSURE: 69 MMHG | HEIGHT: 65 IN | TEMPERATURE: 97 F | WEIGHT: 132.81 LBS | SYSTOLIC BLOOD PRESSURE: 99 MMHG | HEART RATE: 74 BPM

## 2021-03-18 DIAGNOSIS — D49.0 IPMN (INTRADUCTAL PAPILLARY MUCINOUS NEOPLASM): Primary | ICD-10-CM

## 2021-03-18 DIAGNOSIS — R10.9 ABDOMINAL PAIN, UNSPECIFIED ABDOMINAL LOCATION: ICD-10-CM

## 2021-03-18 PROCEDURE — 99214 OFFICE O/P EST MOD 30 MIN: CPT | Mod: PBBFAC,25 | Performed by: SURGERY

## 2021-03-18 PROCEDURE — 99214 PR OFFICE/OUTPT VISIT, EST, LEVL IV, 30-39 MIN: ICD-10-PCS | Mod: S$PBB,,, | Performed by: SURGERY

## 2021-03-18 PROCEDURE — 74177 CT ABD & PELVIS W/CONTRAST: CPT | Mod: TC

## 2021-03-18 PROCEDURE — 74177 CT ABDOMEN PELVIS WITH CONTRAST: ICD-10-PCS | Mod: 26,,, | Performed by: RADIOLOGY

## 2021-03-18 PROCEDURE — 99999 PR PBB SHADOW E&M-EST. PATIENT-LVL IV: CPT | Mod: PBBFAC,,, | Performed by: SURGERY

## 2021-03-18 PROCEDURE — 99214 OFFICE O/P EST MOD 30 MIN: CPT | Mod: S$PBB,,, | Performed by: SURGERY

## 2021-03-18 PROCEDURE — 99999 PR PBB SHADOW E&M-EST. PATIENT-LVL IV: ICD-10-PCS | Mod: PBBFAC,,, | Performed by: SURGERY

## 2021-03-18 PROCEDURE — 74177 CT ABD & PELVIS W/CONTRAST: CPT | Mod: 26,,, | Performed by: RADIOLOGY

## 2021-03-18 PROCEDURE — 25500020 PHARM REV CODE 255: Performed by: SURGERY

## 2021-03-18 RX ORDER — FAMOTIDINE 40 MG/1
40 TABLET, FILM COATED ORAL DAILY
COMMUNITY
Start: 2021-01-25

## 2021-03-18 RX ORDER — OMEPRAZOLE 40 MG/1
40 CAPSULE, DELAYED RELEASE ORAL 2 TIMES DAILY
COMMUNITY
Start: 2021-01-25

## 2021-03-18 RX ADMIN — IOHEXOL 75 ML: 350 INJECTION, SOLUTION INTRAVENOUS at 11:03

## 2021-05-07 ENCOUNTER — APPOINTMENT (RX ONLY)
Dept: URBAN - METROPOLITAN AREA CLINIC 158 | Facility: CLINIC | Age: 61
Setting detail: DERMATOLOGY
End: 2021-05-07

## 2021-05-07 DIAGNOSIS — L57.8 OTHER SKIN CHANGES DUE TO CHRONIC EXPOSURE TO NONIONIZING RADIATION: ICD-10-CM

## 2021-05-07 DIAGNOSIS — L82.1 OTHER SEBORRHEIC KERATOSIS: ICD-10-CM

## 2021-05-07 DIAGNOSIS — L81.4 OTHER MELANIN HYPERPIGMENTATION: ICD-10-CM

## 2021-05-07 DIAGNOSIS — L57.0 ACTINIC KERATOSIS: ICD-10-CM

## 2021-05-07 DIAGNOSIS — L56.8 OTHER SPECIFIED ACUTE SKIN CHANGES DUE TO ULTRAVIOLET RADIATION: ICD-10-CM

## 2021-05-07 PROCEDURE — ? LIQUID NITROGEN

## 2021-05-07 PROCEDURE — 17003 DESTRUCT PREMALG LES 2-14: CPT

## 2021-05-07 PROCEDURE — 17000 DESTRUCT PREMALG LESION: CPT

## 2021-05-07 PROCEDURE — 99213 OFFICE O/P EST LOW 20 MIN: CPT | Mod: 25

## 2021-05-07 PROCEDURE — ? COUNSELING

## 2021-05-07 ASSESSMENT — LOCATION ZONE DERM
LOCATION ZONE: NOSE
LOCATION ZONE: LIP
LOCATION ZONE: FACE
LOCATION ZONE: ARM

## 2021-05-07 ASSESSMENT — LOCATION DETAILED DESCRIPTION DERM
LOCATION DETAILED: RIGHT INFERIOR VERMILION LIP
LOCATION DETAILED: LEFT FOREHEAD
LOCATION DETAILED: RIGHT DISTAL POSTERIOR UPPER ARM
LOCATION DETAILED: NASAL SUPRATIP

## 2021-05-07 ASSESSMENT — LOCATION SIMPLE DESCRIPTION DERM
LOCATION SIMPLE: NOSE
LOCATION SIMPLE: LEFT FOREHEAD
LOCATION SIMPLE: RIGHT LIP
LOCATION SIMPLE: RIGHT UPPER ARM

## 2021-06-30 ENCOUNTER — HOSPITAL ENCOUNTER (INPATIENT)
Facility: HOSPITAL | Age: 61
LOS: 14 days | Discharge: HOME HEALTH | DRG: 445 | End: 2021-07-14
Attending: SURGERY | Admitting: SURGERY
Payer: OTHER GOVERNMENT

## 2021-06-30 DIAGNOSIS — K83.09 CHOLANGITIS: ICD-10-CM

## 2021-06-30 DIAGNOSIS — R10.9 ABDOMINAL PAIN: ICD-10-CM

## 2021-06-30 DIAGNOSIS — R18.8 INTRAABDOMINAL FLUID COLLECTION: ICD-10-CM

## 2021-06-30 DIAGNOSIS — K92.1 GASTROINTESTINAL HEMORRHAGE WITH MELENA: Primary | ICD-10-CM

## 2021-06-30 LAB
ABO + RH BLD: NORMAL
ALBUMIN SERPL BCP-MCNC: 3 G/DL (ref 3.5–5.2)
ALP SERPL-CCNC: 72 U/L (ref 55–135)
ALT SERPL W/O P-5'-P-CCNC: 54 U/L (ref 10–44)
ANION GAP SERPL CALC-SCNC: 14 MMOL/L (ref 8–16)
ANISOCYTOSIS BLD QL SMEAR: SLIGHT
APTT BLDCRRT: 27.6 SEC (ref 21–32)
AST SERPL-CCNC: 60 U/L (ref 10–40)
BACTERIA #/AREA URNS AUTO: ABNORMAL /HPF
BASOPHILS # BLD AUTO: 0.04 K/UL (ref 0–0.2)
BASOPHILS NFR BLD: 0.2 % (ref 0–1.9)
BILIRUB SERPL-MCNC: 0.6 MG/DL (ref 0.1–1)
BILIRUB UR QL STRIP: NEGATIVE
BLD GP AB SCN CELLS X3 SERPL QL: NORMAL
BUN SERPL-MCNC: 16 MG/DL (ref 8–23)
CALCIUM SERPL-MCNC: 8.8 MG/DL (ref 8.7–10.5)
CHLORIDE SERPL-SCNC: 105 MMOL/L (ref 95–110)
CLARITY UR REFRACT.AUTO: ABNORMAL
CO2 SERPL-SCNC: 18 MMOL/L (ref 23–29)
COLOR UR AUTO: ABNORMAL
CREAT SERPL-MCNC: 0.8 MG/DL (ref 0.5–1.4)
DIFFERENTIAL METHOD: ABNORMAL
EOSINOPHIL # BLD AUTO: 0 K/UL (ref 0–0.5)
EOSINOPHIL NFR BLD: 0 % (ref 0–8)
ERYTHROCYTE [DISTWIDTH] IN BLOOD BY AUTOMATED COUNT: 13.9 % (ref 11.5–14.5)
EST. GFR  (AFRICAN AMERICAN): >60 ML/MIN/1.73 M^2
EST. GFR  (NON AFRICAN AMERICAN): >60 ML/MIN/1.73 M^2
GLUCOSE SERPL-MCNC: 97 MG/DL (ref 70–110)
GLUCOSE UR QL STRIP: NEGATIVE
HCT VFR BLD AUTO: 38.3 % (ref 40–54)
HGB BLD-MCNC: 12.9 G/DL (ref 14–18)
HGB UR QL STRIP: NEGATIVE
HYALINE CASTS UR QL AUTO: 0 /LPF
IMM GRANULOCYTES # BLD AUTO: 0.33 K/UL (ref 0–0.04)
IMM GRANULOCYTES NFR BLD AUTO: 1.4 % (ref 0–0.5)
INR PPP: 1 (ref 0.8–1.2)
KETONES UR QL STRIP: ABNORMAL
LACTATE SERPL-SCNC: 1.1 MMOL/L (ref 0.5–2.2)
LEUKOCYTE ESTERASE UR QL STRIP: NEGATIVE
LIPASE SERPL-CCNC: 3 U/L (ref 4–60)
LYMPHOCYTES # BLD AUTO: 0.3 K/UL (ref 1–4.8)
LYMPHOCYTES NFR BLD: 1.3 % (ref 18–48)
MAGNESIUM SERPL-MCNC: 1.5 MG/DL (ref 1.6–2.6)
MCH RBC QN AUTO: 31.9 PG (ref 27–31)
MCHC RBC AUTO-ENTMCNC: 33.7 G/DL (ref 32–36)
MCV RBC AUTO: 95 FL (ref 82–98)
MICROSCOPIC COMMENT: ABNORMAL
MONOCYTES # BLD AUTO: 1.2 K/UL (ref 0.3–1)
MONOCYTES NFR BLD: 5 % (ref 4–15)
NEUTROPHILS # BLD AUTO: 21.2 K/UL (ref 1.8–7.7)
NEUTROPHILS NFR BLD: 92.1 % (ref 38–73)
NITRITE UR QL STRIP: NEGATIVE
NRBC BLD-RTO: 0 /100 WBC
OVALOCYTES BLD QL SMEAR: ABNORMAL
PH UR STRIP: 5 [PH] (ref 5–8)
PHOSPHATE SERPL-MCNC: 3.4 MG/DL (ref 2.7–4.5)
PLATELET # BLD AUTO: 208 K/UL (ref 150–450)
PLATELET BLD QL SMEAR: ABNORMAL
PMV BLD AUTO: 9.7 FL (ref 9.2–12.9)
POIKILOCYTOSIS BLD QL SMEAR: SLIGHT
POTASSIUM SERPL-SCNC: 3.9 MMOL/L (ref 3.5–5.1)
PROT SERPL-MCNC: 6.3 G/DL (ref 6–8.4)
PROT UR QL STRIP: ABNORMAL
PROTHROMBIN TIME: 11.1 SEC (ref 9–12.5)
RBC # BLD AUTO: 4.04 M/UL (ref 4.6–6.2)
RBC #/AREA URNS AUTO: 3 /HPF (ref 0–4)
SODIUM SERPL-SCNC: 137 MMOL/L (ref 136–145)
SP GR UR STRIP: >=1.03 (ref 1–1.03)
SQUAMOUS #/AREA URNS AUTO: 0 /HPF
URN SPEC COLLECT METH UR: ABNORMAL
WBC # BLD AUTO: 23.01 K/UL (ref 3.9–12.7)
WBC #/AREA URNS AUTO: 3 /HPF (ref 0–5)

## 2021-06-30 PROCEDURE — 83735 ASSAY OF MAGNESIUM: CPT | Performed by: STUDENT IN AN ORGANIZED HEALTH CARE EDUCATION/TRAINING PROGRAM

## 2021-06-30 PROCEDURE — 85025 COMPLETE CBC W/AUTO DIFF WBC: CPT | Performed by: STUDENT IN AN ORGANIZED HEALTH CARE EDUCATION/TRAINING PROGRAM

## 2021-06-30 PROCEDURE — 25000003 PHARM REV CODE 250: Performed by: STUDENT IN AN ORGANIZED HEALTH CARE EDUCATION/TRAINING PROGRAM

## 2021-06-30 PROCEDURE — 88112 CYTOPATH CELL ENHANCE TECH: CPT | Mod: 26,,, | Performed by: PATHOLOGY

## 2021-06-30 PROCEDURE — 85730 THROMBOPLASTIN TIME PARTIAL: CPT | Performed by: STUDENT IN AN ORGANIZED HEALTH CARE EDUCATION/TRAINING PROGRAM

## 2021-06-30 PROCEDURE — 87102 FUNGUS ISOLATION CULTURE: CPT | Performed by: STUDENT IN AN ORGANIZED HEALTH CARE EDUCATION/TRAINING PROGRAM

## 2021-06-30 PROCEDURE — 87077 CULTURE AEROBIC IDENTIFY: CPT | Performed by: STUDENT IN AN ORGANIZED HEALTH CARE EDUCATION/TRAINING PROGRAM

## 2021-06-30 PROCEDURE — 88305 TISSUE EXAM BY PATHOLOGIST: ICD-10-PCS | Mod: 26,,, | Performed by: PATHOLOGY

## 2021-06-30 PROCEDURE — 87205 SMEAR GRAM STAIN: CPT | Performed by: STUDENT IN AN ORGANIZED HEALTH CARE EDUCATION/TRAINING PROGRAM

## 2021-06-30 PROCEDURE — 87075 CULTR BACTERIA EXCEPT BLOOD: CPT | Performed by: STUDENT IN AN ORGANIZED HEALTH CARE EDUCATION/TRAINING PROGRAM

## 2021-06-30 PROCEDURE — 80053 COMPREHEN METABOLIC PANEL: CPT | Performed by: STUDENT IN AN ORGANIZED HEALTH CARE EDUCATION/TRAINING PROGRAM

## 2021-06-30 PROCEDURE — 99900035 HC TECH TIME PER 15 MIN (STAT)

## 2021-06-30 PROCEDURE — 86900 BLOOD TYPING SEROLOGIC ABO: CPT | Performed by: SURGERY

## 2021-06-30 PROCEDURE — 81001 URINALYSIS AUTO W/SCOPE: CPT | Performed by: STUDENT IN AN ORGANIZED HEALTH CARE EDUCATION/TRAINING PROGRAM

## 2021-06-30 PROCEDURE — 87116 MYCOBACTERIA CULTURE: CPT | Performed by: STUDENT IN AN ORGANIZED HEALTH CARE EDUCATION/TRAINING PROGRAM

## 2021-06-30 PROCEDURE — 83605 ASSAY OF LACTIC ACID: CPT | Performed by: SURGERY

## 2021-06-30 PROCEDURE — 94761 N-INVAS EAR/PLS OXIMETRY MLT: CPT

## 2021-06-30 PROCEDURE — 99223 1ST HOSP IP/OBS HIGH 75: CPT | Mod: ,,, | Performed by: SURGERY

## 2021-06-30 PROCEDURE — 87070 CULTURE OTHR SPECIMN AEROBIC: CPT | Performed by: STUDENT IN AN ORGANIZED HEALTH CARE EDUCATION/TRAINING PROGRAM

## 2021-06-30 PROCEDURE — 88305 TISSUE EXAM BY PATHOLOGIST: CPT | Performed by: PATHOLOGY

## 2021-06-30 PROCEDURE — C9113 INJ PANTOPRAZOLE SODIUM, VIA: HCPCS | Performed by: STUDENT IN AN ORGANIZED HEALTH CARE EDUCATION/TRAINING PROGRAM

## 2021-06-30 PROCEDURE — 87206 SMEAR FLUORESCENT/ACID STAI: CPT | Performed by: STUDENT IN AN ORGANIZED HEALTH CARE EDUCATION/TRAINING PROGRAM

## 2021-06-30 PROCEDURE — 85610 PROTHROMBIN TIME: CPT | Performed by: STUDENT IN AN ORGANIZED HEALTH CARE EDUCATION/TRAINING PROGRAM

## 2021-06-30 PROCEDURE — 63600175 PHARM REV CODE 636 W HCPCS: Performed by: STUDENT IN AN ORGANIZED HEALTH CARE EDUCATION/TRAINING PROGRAM

## 2021-06-30 PROCEDURE — 84100 ASSAY OF PHOSPHORUS: CPT | Performed by: STUDENT IN AN ORGANIZED HEALTH CARE EDUCATION/TRAINING PROGRAM

## 2021-06-30 PROCEDURE — 25000003 PHARM REV CODE 250: Performed by: SURGERY

## 2021-06-30 PROCEDURE — 27000221 HC OXYGEN, UP TO 24 HOURS

## 2021-06-30 PROCEDURE — 63600175 PHARM REV CODE 636 W HCPCS: Performed by: RADIOLOGY

## 2021-06-30 PROCEDURE — 87040 BLOOD CULTURE FOR BACTERIA: CPT | Mod: 59 | Performed by: STUDENT IN AN ORGANIZED HEALTH CARE EDUCATION/TRAINING PROGRAM

## 2021-06-30 PROCEDURE — 87186 SC STD MICRODIL/AGAR DIL: CPT | Performed by: STUDENT IN AN ORGANIZED HEALTH CARE EDUCATION/TRAINING PROGRAM

## 2021-06-30 PROCEDURE — 99223 PR INITIAL HOSPITAL CARE,LEVL III: ICD-10-PCS | Mod: ,,, | Performed by: SURGERY

## 2021-06-30 PROCEDURE — 88305 TISSUE EXAM BY PATHOLOGIST: CPT | Mod: 26,,, | Performed by: PATHOLOGY

## 2021-06-30 PROCEDURE — 20000000 HC ICU ROOM

## 2021-06-30 PROCEDURE — 83690 ASSAY OF LIPASE: CPT | Performed by: STUDENT IN AN ORGANIZED HEALTH CARE EDUCATION/TRAINING PROGRAM

## 2021-06-30 PROCEDURE — 88112 PR  CYTOPATH, CELL ENHANCE TECH: ICD-10-PCS | Mod: 26,,, | Performed by: PATHOLOGY

## 2021-06-30 PROCEDURE — 88112 CYTOPATH CELL ENHANCE TECH: CPT | Performed by: PATHOLOGY

## 2021-06-30 RX ORDER — ACETAMINOPHEN 325 MG/1
650 TABLET ORAL EVERY 6 HOURS PRN
Status: DISCONTINUED | OUTPATIENT
Start: 2021-06-30 | End: 2021-07-01

## 2021-06-30 RX ORDER — SODIUM CHLORIDE 0.9 % (FLUSH) 0.9 %
10 SYRINGE (ML) INJECTION
Status: DISCONTINUED | OUTPATIENT
Start: 2021-06-30 | End: 2021-07-14 | Stop reason: HOSPADM

## 2021-06-30 RX ORDER — POTASSIUM CHLORIDE 7.45 MG/ML
40 INJECTION INTRAVENOUS
Status: DISCONTINUED | OUTPATIENT
Start: 2021-06-30 | End: 2021-07-04

## 2021-06-30 RX ORDER — ONDANSETRON 2 MG/ML
4 INJECTION INTRAMUSCULAR; INTRAVENOUS EVERY 8 HOURS PRN
Status: DISCONTINUED | OUTPATIENT
Start: 2021-06-30 | End: 2021-07-14 | Stop reason: HOSPADM

## 2021-06-30 RX ORDER — HYDROMORPHONE HYDROCHLORIDE 1 MG/ML
0.2 INJECTION, SOLUTION INTRAMUSCULAR; INTRAVENOUS; SUBCUTANEOUS EVERY 4 HOURS PRN
Status: DISCONTINUED | OUTPATIENT
Start: 2021-06-30 | End: 2021-07-01

## 2021-06-30 RX ORDER — MAGNESIUM SULFATE HEPTAHYDRATE 40 MG/ML
4 INJECTION, SOLUTION INTRAVENOUS
Status: DISCONTINUED | OUTPATIENT
Start: 2021-06-30 | End: 2021-07-04

## 2021-06-30 RX ORDER — MIDAZOLAM HYDROCHLORIDE 1 MG/ML
INJECTION INTRAMUSCULAR; INTRAVENOUS CODE/TRAUMA/SEDATION MEDICATION
Status: COMPLETED | OUTPATIENT
Start: 2021-06-30 | End: 2021-06-30

## 2021-06-30 RX ORDER — PANTOPRAZOLE SODIUM 40 MG/10ML
40 INJECTION, POWDER, LYOPHILIZED, FOR SOLUTION INTRAVENOUS 2 TIMES DAILY
Status: DISCONTINUED | OUTPATIENT
Start: 2021-06-30 | End: 2021-07-01

## 2021-06-30 RX ORDER — FENTANYL CITRATE 50 UG/ML
INJECTION, SOLUTION INTRAMUSCULAR; INTRAVENOUS CODE/TRAUMA/SEDATION MEDICATION
Status: COMPLETED | OUTPATIENT
Start: 2021-06-30 | End: 2021-06-30

## 2021-06-30 RX ORDER — POTASSIUM CHLORIDE 7.45 MG/ML
60 INJECTION INTRAVENOUS
Status: DISCONTINUED | OUTPATIENT
Start: 2021-06-30 | End: 2021-07-04

## 2021-06-30 RX ORDER — MUPIROCIN 20 MG/G
OINTMENT TOPICAL 2 TIMES DAILY
Status: COMPLETED | OUTPATIENT
Start: 2021-06-30 | End: 2021-07-04

## 2021-06-30 RX ORDER — POTASSIUM CHLORIDE 7.45 MG/ML
80 INJECTION INTRAVENOUS
Status: DISCONTINUED | OUTPATIENT
Start: 2021-06-30 | End: 2021-07-04

## 2021-06-30 RX ORDER — MAGNESIUM SULFATE HEPTAHYDRATE 40 MG/ML
2 INJECTION, SOLUTION INTRAVENOUS
Status: DISCONTINUED | OUTPATIENT
Start: 2021-06-30 | End: 2021-07-04

## 2021-06-30 RX ORDER — SODIUM CHLORIDE, SODIUM LACTATE, POTASSIUM CHLORIDE, CALCIUM CHLORIDE 600; 310; 30; 20 MG/100ML; MG/100ML; MG/100ML; MG/100ML
INJECTION, SOLUTION INTRAVENOUS CONTINUOUS
Status: DISCONTINUED | OUTPATIENT
Start: 2021-06-30 | End: 2021-07-01

## 2021-06-30 RX ORDER — SODIUM CHLORIDE 9 MG/ML
INJECTION, SOLUTION INTRAVENOUS CONTINUOUS
Status: DISCONTINUED | OUTPATIENT
Start: 2021-06-30 | End: 2021-07-01

## 2021-06-30 RX ORDER — HYDROMORPHONE HYDROCHLORIDE 1 MG/ML
0.5 INJECTION, SOLUTION INTRAMUSCULAR; INTRAVENOUS; SUBCUTANEOUS EVERY 4 HOURS PRN
Status: DISCONTINUED | OUTPATIENT
Start: 2021-06-30 | End: 2021-07-01

## 2021-06-30 RX ADMIN — PANTOPRAZOLE SODIUM 40 MG: 40 INJECTION, POWDER, FOR SOLUTION INTRAVENOUS at 02:06

## 2021-06-30 RX ADMIN — HYDROMORPHONE HYDROCHLORIDE 0.5 MG: 1 INJECTION, SOLUTION INTRAMUSCULAR; INTRAVENOUS; SUBCUTANEOUS at 07:06

## 2021-06-30 RX ADMIN — PIPERACILLIN SODIUM AND TAZOBACTAM SODIUM 4.5 G: 4; .5 INJECTION, POWDER, FOR SOLUTION INTRAVENOUS at 01:06

## 2021-06-30 RX ADMIN — HYDROMORPHONE HYDROCHLORIDE 0.2 MG: 1 INJECTION, SOLUTION INTRAMUSCULAR; INTRAVENOUS; SUBCUTANEOUS at 01:06

## 2021-06-30 RX ADMIN — HYDROMORPHONE HYDROCHLORIDE 0.5 MG: 1 INJECTION, SOLUTION INTRAMUSCULAR; INTRAVENOUS; SUBCUTANEOUS at 02:06

## 2021-06-30 RX ADMIN — PIPERACILLIN SODIUM AND TAZOBACTAM SODIUM 4.5 G: 4; .5 INJECTION, POWDER, FOR SOLUTION INTRAVENOUS at 08:06

## 2021-06-30 RX ADMIN — PANTOPRAZOLE SODIUM 40 MG: 40 INJECTION, POWDER, FOR SOLUTION INTRAVENOUS at 08:06

## 2021-06-30 RX ADMIN — MIDAZOLAM HYDROCHLORIDE 0.5 MG: 1 INJECTION INTRAMUSCULAR; INTRAVENOUS at 05:06

## 2021-06-30 RX ADMIN — SODIUM CHLORIDE: 0.9 INJECTION, SOLUTION INTRAVENOUS at 04:06

## 2021-06-30 RX ADMIN — ACETAMINOPHEN 650 MG: 325 TABLET ORAL at 08:06

## 2021-06-30 RX ADMIN — FENTANYL CITRATE 25 MCG: 50 INJECTION, SOLUTION INTRAMUSCULAR; INTRAVENOUS at 05:06

## 2021-06-30 RX ADMIN — SODIUM CHLORIDE, SODIUM LACTATE, POTASSIUM CHLORIDE, AND CALCIUM CHLORIDE: .6; .31; .03; .02 INJECTION, SOLUTION INTRAVENOUS at 03:06

## 2021-06-30 RX ADMIN — HYDROMORPHONE HYDROCHLORIDE 0.2 MG: 1 INJECTION, SOLUTION INTRAMUSCULAR; INTRAVENOUS; SUBCUTANEOUS at 08:06

## 2021-06-30 RX ADMIN — ACETAMINOPHEN 650 MG: 325 TABLET ORAL at 02:06

## 2021-06-30 RX ADMIN — HYDROMORPHONE HYDROCHLORIDE 0.5 MG: 1 INJECTION, SOLUTION INTRAMUSCULAR; INTRAVENOUS; SUBCUTANEOUS at 05:06

## 2021-06-30 RX ADMIN — PIPERACILLIN SODIUM AND TAZOBACTAM SODIUM 4.5 G: 4; .5 INJECTION, POWDER, FOR SOLUTION INTRAVENOUS at 04:06

## 2021-06-30 RX ADMIN — HYDROMORPHONE HYDROCHLORIDE 0.5 MG: 1 INJECTION, SOLUTION INTRAMUSCULAR; INTRAVENOUS; SUBCUTANEOUS at 11:06

## 2021-06-30 RX ADMIN — MUPIROCIN: 20 OINTMENT TOPICAL at 08:06

## 2021-06-30 RX ADMIN — MAGNESIUM SULFATE 2 G: 2 INJECTION INTRAVENOUS at 04:06

## 2021-06-30 RX ADMIN — SODIUM CHLORIDE, SODIUM LACTATE, POTASSIUM CHLORIDE, AND CALCIUM CHLORIDE 1000 ML: .6; .31; .03; .02 INJECTION, SOLUTION INTRAVENOUS at 07:06

## 2021-07-01 LAB
ALBUMIN SERPL BCP-MCNC: 2.3 G/DL (ref 3.5–5.2)
ALBUMIN SERPL BCP-MCNC: 2.7 G/DL (ref 3.5–5.2)
ALP SERPL-CCNC: 65 U/L (ref 55–135)
ALP SERPL-CCNC: 93 U/L (ref 55–135)
ALT SERPL W/O P-5'-P-CCNC: 38 U/L (ref 10–44)
ALT SERPL W/O P-5'-P-CCNC: 39 U/L (ref 10–44)
ANION GAP SERPL CALC-SCNC: 11 MMOL/L (ref 8–16)
ANION GAP SERPL CALC-SCNC: 9 MMOL/L (ref 8–16)
AST SERPL-CCNC: 33 U/L (ref 10–40)
AST SERPL-CCNC: 34 U/L (ref 10–40)
BASOPHILS # BLD AUTO: 0.02 K/UL (ref 0–0.2)
BASOPHILS # BLD AUTO: 0.04 K/UL (ref 0–0.2)
BASOPHILS NFR BLD: 0.2 % (ref 0–1.9)
BASOPHILS NFR BLD: 0.3 % (ref 0–1.9)
BILIRUB SERPL-MCNC: 0.4 MG/DL (ref 0.1–1)
BILIRUB SERPL-MCNC: 0.5 MG/DL (ref 0.1–1)
BUN SERPL-MCNC: 14 MG/DL (ref 8–23)
BUN SERPL-MCNC: 17 MG/DL (ref 8–23)
CALCIUM SERPL-MCNC: 8.4 MG/DL (ref 8.7–10.5)
CALCIUM SERPL-MCNC: 9.1 MG/DL (ref 8.7–10.5)
CHLORIDE SERPL-SCNC: 100 MMOL/L (ref 95–110)
CHLORIDE SERPL-SCNC: 107 MMOL/L (ref 95–110)
CO2 SERPL-SCNC: 22 MMOL/L (ref 23–29)
CO2 SERPL-SCNC: 26 MMOL/L (ref 23–29)
CREAT SERPL-MCNC: 0.7 MG/DL (ref 0.5–1.4)
CREAT SERPL-MCNC: 0.7 MG/DL (ref 0.5–1.4)
DIFFERENTIAL METHOD: ABNORMAL
DIFFERENTIAL METHOD: ABNORMAL
EOSINOPHIL # BLD AUTO: 0.1 K/UL (ref 0–0.5)
EOSINOPHIL # BLD AUTO: 0.1 K/UL (ref 0–0.5)
EOSINOPHIL NFR BLD: 0.3 % (ref 0–8)
EOSINOPHIL NFR BLD: 0.7 % (ref 0–8)
ERYTHROCYTE [DISTWIDTH] IN BLOOD BY AUTOMATED COUNT: 13.6 % (ref 11.5–14.5)
ERYTHROCYTE [DISTWIDTH] IN BLOOD BY AUTOMATED COUNT: 13.9 % (ref 11.5–14.5)
EST. GFR  (AFRICAN AMERICAN): >60 ML/MIN/1.73 M^2
EST. GFR  (AFRICAN AMERICAN): >60 ML/MIN/1.73 M^2
EST. GFR  (NON AFRICAN AMERICAN): >60 ML/MIN/1.73 M^2
EST. GFR  (NON AFRICAN AMERICAN): >60 ML/MIN/1.73 M^2
GLUCOSE SERPL-MCNC: 92 MG/DL (ref 70–110)
GLUCOSE SERPL-MCNC: 94 MG/DL (ref 70–110)
GRAM STN SPEC: NORMAL
GRAM STN SPEC: NORMAL
HCT VFR BLD AUTO: 33.6 % (ref 40–54)
HCT VFR BLD AUTO: 36.8 % (ref 40–54)
HGB BLD-MCNC: 10.9 G/DL (ref 14–18)
HGB BLD-MCNC: 12.1 G/DL (ref 14–18)
IMM GRANULOCYTES # BLD AUTO: 0.07 K/UL (ref 0–0.04)
IMM GRANULOCYTES # BLD AUTO: 0.11 K/UL (ref 0–0.04)
IMM GRANULOCYTES NFR BLD AUTO: 0.6 % (ref 0–0.5)
IMM GRANULOCYTES NFR BLD AUTO: 0.7 % (ref 0–0.5)
LYMPHOCYTES # BLD AUTO: 0.6 K/UL (ref 1–4.8)
LYMPHOCYTES # BLD AUTO: 0.6 K/UL (ref 1–4.8)
LYMPHOCYTES NFR BLD: 4 % (ref 18–48)
LYMPHOCYTES NFR BLD: 5.1 % (ref 18–48)
MAGNESIUM SERPL-MCNC: 2 MG/DL (ref 1.6–2.6)
MCH RBC QN AUTO: 30.7 PG (ref 27–31)
MCH RBC QN AUTO: 31 PG (ref 27–31)
MCHC RBC AUTO-ENTMCNC: 32.4 G/DL (ref 32–36)
MCHC RBC AUTO-ENTMCNC: 32.9 G/DL (ref 32–36)
MCV RBC AUTO: 93 FL (ref 82–98)
MCV RBC AUTO: 96 FL (ref 82–98)
MONOCYTES # BLD AUTO: 1.1 K/UL (ref 0.3–1)
MONOCYTES # BLD AUTO: 1.7 K/UL (ref 0.3–1)
MONOCYTES NFR BLD: 10.9 % (ref 4–15)
MONOCYTES NFR BLD: 8.9 % (ref 4–15)
NEUTROPHILS # BLD AUTO: 10 K/UL (ref 1.8–7.7)
NEUTROPHILS # BLD AUTO: 12.9 K/UL (ref 1.8–7.7)
NEUTROPHILS NFR BLD: 83.8 % (ref 38–73)
NEUTROPHILS NFR BLD: 84.5 % (ref 38–73)
NRBC BLD-RTO: 0 /100 WBC
NRBC BLD-RTO: 0 /100 WBC
PHOSPHATE SERPL-MCNC: 2.7 MG/DL (ref 2.7–4.5)
PLATELET # BLD AUTO: 167 K/UL (ref 150–450)
PLATELET # BLD AUTO: 187 K/UL (ref 150–450)
PMV BLD AUTO: 9.6 FL (ref 9.2–12.9)
PMV BLD AUTO: 9.7 FL (ref 9.2–12.9)
POTASSIUM SERPL-SCNC: 4.3 MMOL/L (ref 3.5–5.1)
POTASSIUM SERPL-SCNC: 4.3 MMOL/L (ref 3.5–5.1)
PROT SERPL-MCNC: 5.5 G/DL (ref 6–8.4)
PROT SERPL-MCNC: 6.4 G/DL (ref 6–8.4)
RBC # BLD AUTO: 3.52 M/UL (ref 4.6–6.2)
RBC # BLD AUTO: 3.94 M/UL (ref 4.6–6.2)
SODIUM SERPL-SCNC: 137 MMOL/L (ref 136–145)
SODIUM SERPL-SCNC: 138 MMOL/L (ref 136–145)
WBC # BLD AUTO: 11.82 K/UL (ref 3.9–12.7)
WBC # BLD AUTO: 15.4 K/UL (ref 3.9–12.7)

## 2021-07-01 PROCEDURE — 25000003 PHARM REV CODE 250: Performed by: STUDENT IN AN ORGANIZED HEALTH CARE EDUCATION/TRAINING PROGRAM

## 2021-07-01 PROCEDURE — 83735 ASSAY OF MAGNESIUM: CPT | Performed by: STUDENT IN AN ORGANIZED HEALTH CARE EDUCATION/TRAINING PROGRAM

## 2021-07-01 PROCEDURE — 63600175 PHARM REV CODE 636 W HCPCS: Performed by: STUDENT IN AN ORGANIZED HEALTH CARE EDUCATION/TRAINING PROGRAM

## 2021-07-01 PROCEDURE — 87040 BLOOD CULTURE FOR BACTERIA: CPT | Performed by: STUDENT IN AN ORGANIZED HEALTH CARE EDUCATION/TRAINING PROGRAM

## 2021-07-01 PROCEDURE — 94761 N-INVAS EAR/PLS OXIMETRY MLT: CPT

## 2021-07-01 PROCEDURE — 85025 COMPLETE CBC W/AUTO DIFF WBC: CPT | Mod: 91 | Performed by: STUDENT IN AN ORGANIZED HEALTH CARE EDUCATION/TRAINING PROGRAM

## 2021-07-01 PROCEDURE — 99233 SBSQ HOSP IP/OBS HIGH 50: CPT | Mod: ,,, | Performed by: SURGERY

## 2021-07-01 PROCEDURE — 80053 COMPREHEN METABOLIC PANEL: CPT | Performed by: STUDENT IN AN ORGANIZED HEALTH CARE EDUCATION/TRAINING PROGRAM

## 2021-07-01 PROCEDURE — 80053 COMPREHEN METABOLIC PANEL: CPT | Mod: 91 | Performed by: STUDENT IN AN ORGANIZED HEALTH CARE EDUCATION/TRAINING PROGRAM

## 2021-07-01 PROCEDURE — 20000000 HC ICU ROOM

## 2021-07-01 PROCEDURE — 27000221 HC OXYGEN, UP TO 24 HOURS

## 2021-07-01 PROCEDURE — 99233 PR SUBSEQUENT HOSPITAL CARE,LEVL III: ICD-10-PCS | Mod: ,,, | Performed by: SURGERY

## 2021-07-01 PROCEDURE — 84100 ASSAY OF PHOSPHORUS: CPT | Performed by: STUDENT IN AN ORGANIZED HEALTH CARE EDUCATION/TRAINING PROGRAM

## 2021-07-01 PROCEDURE — 85025 COMPLETE CBC W/AUTO DIFF WBC: CPT | Performed by: STUDENT IN AN ORGANIZED HEALTH CARE EDUCATION/TRAINING PROGRAM

## 2021-07-01 RX ORDER — ACETAMINOPHEN 325 MG/1
650 TABLET ORAL EVERY 6 HOURS PRN
Status: DISCONTINUED | OUTPATIENT
Start: 2021-07-01 | End: 2021-07-14 | Stop reason: HOSPADM

## 2021-07-01 RX ORDER — OXYCODONE HYDROCHLORIDE 5 MG/1
10 TABLET ORAL EVERY 4 HOURS PRN
Status: DISCONTINUED | OUTPATIENT
Start: 2021-07-01 | End: 2021-07-12

## 2021-07-01 RX ORDER — PANTOPRAZOLE SODIUM 40 MG/1
40 TABLET, DELAYED RELEASE ORAL
Status: DISCONTINUED | OUTPATIENT
Start: 2021-07-01 | End: 2021-07-14 | Stop reason: HOSPADM

## 2021-07-01 RX ORDER — OXYCODONE HYDROCHLORIDE 5 MG/1
5 TABLET ORAL EVERY 4 HOURS PRN
Status: DISCONTINUED | OUTPATIENT
Start: 2021-07-01 | End: 2021-07-14 | Stop reason: HOSPADM

## 2021-07-01 RX ORDER — ENOXAPARIN SODIUM 100 MG/ML
40 INJECTION SUBCUTANEOUS EVERY 24 HOURS
Status: DISCONTINUED | OUTPATIENT
Start: 2021-07-01 | End: 2021-07-14 | Stop reason: HOSPADM

## 2021-07-01 RX ADMIN — PIPERACILLIN SODIUM AND TAZOBACTAM SODIUM 4.5 G: 4; .5 INJECTION, POWDER, FOR SOLUTION INTRAVENOUS at 09:07

## 2021-07-01 RX ADMIN — MUPIROCIN: 20 OINTMENT TOPICAL at 09:07

## 2021-07-01 RX ADMIN — OXYCODONE HYDROCHLORIDE 10 MG: 10 TABLET ORAL at 09:07

## 2021-07-01 RX ADMIN — HYDROMORPHONE HYDROCHLORIDE 0.2 MG: 1 INJECTION, SOLUTION INTRAMUSCULAR; INTRAVENOUS; SUBCUTANEOUS at 05:07

## 2021-07-01 RX ADMIN — PANTOPRAZOLE SODIUM 40 MG: 40 TABLET, DELAYED RELEASE ORAL at 04:07

## 2021-07-01 RX ADMIN — ACETAMINOPHEN 650 MG: 325 TABLET ORAL at 04:07

## 2021-07-01 RX ADMIN — SODIUM CHLORIDE, SODIUM LACTATE, POTASSIUM CHLORIDE, AND CALCIUM CHLORIDE: .6; .31; .03; .02 INJECTION, SOLUTION INTRAVENOUS at 05:07

## 2021-07-01 RX ADMIN — PIPERACILLIN SODIUM AND TAZOBACTAM SODIUM 4.5 G: 4; .5 INJECTION, POWDER, FOR SOLUTION INTRAVENOUS at 01:07

## 2021-07-01 RX ADMIN — PIPERACILLIN SODIUM AND TAZOBACTAM SODIUM 4.5 G: 4; .5 INJECTION, POWDER, FOR SOLUTION INTRAVENOUS at 04:07

## 2021-07-01 RX ADMIN — ACETAMINOPHEN 650 MG: 325 TABLET ORAL at 06:07

## 2021-07-01 RX ADMIN — ENOXAPARIN SODIUM 40 MG: 40 INJECTION SUBCUTANEOUS at 04:07

## 2021-07-01 RX ADMIN — OXYCODONE HYDROCHLORIDE 10 MG: 10 TABLET ORAL at 02:07

## 2021-07-01 RX ADMIN — OXYCODONE HYDROCHLORIDE 10 MG: 10 TABLET ORAL at 06:07

## 2021-07-02 LAB
ALBUMIN SERPL BCP-MCNC: 2.3 G/DL (ref 3.5–5.2)
ALP SERPL-CCNC: 71 U/L (ref 55–135)
ALT SERPL W/O P-5'-P-CCNC: 26 U/L (ref 10–44)
ANION GAP SERPL CALC-SCNC: 11 MMOL/L (ref 8–16)
AST SERPL-CCNC: 22 U/L (ref 10–40)
BASOPHILS # BLD AUTO: 0.03 K/UL (ref 0–0.2)
BASOPHILS NFR BLD: 0.3 % (ref 0–1.9)
BILIRUB SERPL-MCNC: 0.3 MG/DL (ref 0.1–1)
BUN SERPL-MCNC: 12 MG/DL (ref 8–23)
CALCIUM SERPL-MCNC: 8.5 MG/DL (ref 8.7–10.5)
CHLORIDE SERPL-SCNC: 104 MMOL/L (ref 95–110)
CO2 SERPL-SCNC: 23 MMOL/L (ref 23–29)
CREAT SERPL-MCNC: 0.7 MG/DL (ref 0.5–1.4)
DIFFERENTIAL METHOD: ABNORMAL
EOSINOPHIL # BLD AUTO: 0.1 K/UL (ref 0–0.5)
EOSINOPHIL NFR BLD: 1.1 % (ref 0–8)
ERYTHROCYTE [DISTWIDTH] IN BLOOD BY AUTOMATED COUNT: 13.7 % (ref 11.5–14.5)
EST. GFR  (AFRICAN AMERICAN): >60 ML/MIN/1.73 M^2
EST. GFR  (NON AFRICAN AMERICAN): >60 ML/MIN/1.73 M^2
FINAL PATHOLOGIC DIAGNOSIS: NORMAL
GLUCOSE SERPL-MCNC: 97 MG/DL (ref 70–110)
HCT VFR BLD AUTO: 31.8 % (ref 40–54)
HGB BLD-MCNC: 10.4 G/DL (ref 14–18)
IMM GRANULOCYTES # BLD AUTO: 0.07 K/UL (ref 0–0.04)
IMM GRANULOCYTES NFR BLD AUTO: 0.6 % (ref 0–0.5)
LYMPHOCYTES # BLD AUTO: 0.7 K/UL (ref 1–4.8)
LYMPHOCYTES NFR BLD: 6 % (ref 18–48)
Lab: NORMAL
MAGNESIUM SERPL-MCNC: 1.9 MG/DL (ref 1.6–2.6)
MAGNESIUM SERPL-MCNC: 1.9 MG/DL (ref 1.6–2.6)
MCH RBC QN AUTO: 30.1 PG (ref 27–31)
MCHC RBC AUTO-ENTMCNC: 32.7 G/DL (ref 32–36)
MCV RBC AUTO: 92 FL (ref 82–98)
MONOCYTES # BLD AUTO: 1.2 K/UL (ref 0.3–1)
MONOCYTES NFR BLD: 10.6 % (ref 4–15)
NEUTROPHILS # BLD AUTO: 9.3 K/UL (ref 1.8–7.7)
NEUTROPHILS NFR BLD: 81.4 % (ref 38–73)
NRBC BLD-RTO: 0 /100 WBC
PHOSPHATE SERPL-MCNC: 2 MG/DL (ref 2.7–4.5)
PHOSPHATE SERPL-MCNC: 2.6 MG/DL (ref 2.7–4.5)
PLATELET # BLD AUTO: 176 K/UL (ref 150–450)
PMV BLD AUTO: 9.8 FL (ref 9.2–12.9)
POTASSIUM SERPL-SCNC: 3.9 MMOL/L (ref 3.5–5.1)
POTASSIUM SERPL-SCNC: 3.9 MMOL/L (ref 3.5–5.1)
PROT SERPL-MCNC: 5.7 G/DL (ref 6–8.4)
RBC # BLD AUTO: 3.46 M/UL (ref 4.6–6.2)
SODIUM SERPL-SCNC: 138 MMOL/L (ref 136–145)
WBC # BLD AUTO: 11.37 K/UL (ref 3.9–12.7)

## 2021-07-02 PROCEDURE — 84132 ASSAY OF SERUM POTASSIUM: CPT | Performed by: SURGERY

## 2021-07-02 PROCEDURE — 94761 N-INVAS EAR/PLS OXIMETRY MLT: CPT

## 2021-07-02 PROCEDURE — 99233 SBSQ HOSP IP/OBS HIGH 50: CPT | Mod: ,,, | Performed by: SURGERY

## 2021-07-02 PROCEDURE — 83735 ASSAY OF MAGNESIUM: CPT | Performed by: STUDENT IN AN ORGANIZED HEALTH CARE EDUCATION/TRAINING PROGRAM

## 2021-07-02 PROCEDURE — 25000003 PHARM REV CODE 250: Performed by: STUDENT IN AN ORGANIZED HEALTH CARE EDUCATION/TRAINING PROGRAM

## 2021-07-02 PROCEDURE — 63600175 PHARM REV CODE 636 W HCPCS: Performed by: STUDENT IN AN ORGANIZED HEALTH CARE EDUCATION/TRAINING PROGRAM

## 2021-07-02 PROCEDURE — 84100 ASSAY OF PHOSPHORUS: CPT | Mod: 91 | Performed by: SURGERY

## 2021-07-02 PROCEDURE — 84100 ASSAY OF PHOSPHORUS: CPT | Performed by: STUDENT IN AN ORGANIZED HEALTH CARE EDUCATION/TRAINING PROGRAM

## 2021-07-02 PROCEDURE — 99233 PR SUBSEQUENT HOSPITAL CARE,LEVL III: ICD-10-PCS | Mod: ,,, | Performed by: SURGERY

## 2021-07-02 PROCEDURE — 83735 ASSAY OF MAGNESIUM: CPT | Mod: 91 | Performed by: SURGERY

## 2021-07-02 PROCEDURE — 25500020 PHARM REV CODE 255: Performed by: STUDENT IN AN ORGANIZED HEALTH CARE EDUCATION/TRAINING PROGRAM

## 2021-07-02 PROCEDURE — 80053 COMPREHEN METABOLIC PANEL: CPT | Performed by: STUDENT IN AN ORGANIZED HEALTH CARE EDUCATION/TRAINING PROGRAM

## 2021-07-02 PROCEDURE — 20000000 HC ICU ROOM

## 2021-07-02 PROCEDURE — 85025 COMPLETE CBC W/AUTO DIFF WBC: CPT | Performed by: STUDENT IN AN ORGANIZED HEALTH CARE EDUCATION/TRAINING PROGRAM

## 2021-07-02 PROCEDURE — 25500020 PHARM REV CODE 255: Performed by: SURGERY

## 2021-07-02 RX ADMIN — POTASSIUM CHLORIDE 10 MEQ: 7.46 INJECTION, SOLUTION INTRAVENOUS at 12:07

## 2021-07-02 RX ADMIN — IOHEXOL 15 ML: 350 INJECTION, SOLUTION INTRAVENOUS at 01:07

## 2021-07-02 RX ADMIN — IOHEXOL 75 ML: 350 INJECTION, SOLUTION INTRAVENOUS at 02:07

## 2021-07-02 RX ADMIN — SODIUM PHOSPHATE, MONOBASIC, MONOHYDRATE 15 MMOL: 276; 142 INJECTION, SOLUTION INTRAVENOUS at 06:07

## 2021-07-02 RX ADMIN — PANTOPRAZOLE SODIUM 40 MG: 40 TABLET, DELAYED RELEASE ORAL at 06:07

## 2021-07-02 RX ADMIN — PIPERACILLIN SODIUM AND TAZOBACTAM SODIUM 4.5 G: 4; .5 INJECTION, POWDER, FOR SOLUTION INTRAVENOUS at 12:07

## 2021-07-02 RX ADMIN — OXYCODONE HYDROCHLORIDE 10 MG: 10 TABLET ORAL at 10:07

## 2021-07-02 RX ADMIN — OXYCODONE HYDROCHLORIDE 10 MG: 10 TABLET ORAL at 01:07

## 2021-07-02 RX ADMIN — MUPIROCIN: 20 OINTMENT TOPICAL at 09:07

## 2021-07-02 RX ADMIN — POTASSIUM CHLORIDE 10 MEQ: 7.46 INJECTION, SOLUTION INTRAVENOUS at 06:07

## 2021-07-02 RX ADMIN — PIPERACILLIN SODIUM AND TAZOBACTAM SODIUM 4.5 G: 4; .5 INJECTION, POWDER, FOR SOLUTION INTRAVENOUS at 09:07

## 2021-07-02 RX ADMIN — POTASSIUM CHLORIDE 10 MEQ: 7.46 INJECTION, SOLUTION INTRAVENOUS at 02:07

## 2021-07-02 RX ADMIN — OXYCODONE HYDROCHLORIDE 10 MG: 10 TABLET ORAL at 05:07

## 2021-07-02 RX ADMIN — SODIUM PHOSPHATE, MONOBASIC, MONOHYDRATE 20.01 MMOL: 276; 142 INJECTION, SOLUTION INTRAVENOUS at 09:07

## 2021-07-02 RX ADMIN — POTASSIUM CHLORIDE 10 MEQ: 7.46 INJECTION, SOLUTION INTRAVENOUS at 10:07

## 2021-07-02 RX ADMIN — OXYCODONE HYDROCHLORIDE 10 MG: 10 TABLET ORAL at 02:07

## 2021-07-02 RX ADMIN — PANTOPRAZOLE SODIUM 40 MG: 40 TABLET, DELAYED RELEASE ORAL at 04:07

## 2021-07-02 RX ADMIN — PIPERACILLIN SODIUM AND TAZOBACTAM SODIUM 4.5 G: 4; .5 INJECTION, POWDER, FOR SOLUTION INTRAVENOUS at 05:07

## 2021-07-02 RX ADMIN — ENOXAPARIN SODIUM 40 MG: 40 INJECTION SUBCUTANEOUS at 04:07

## 2021-07-02 RX ADMIN — OXYCODONE HYDROCHLORIDE 10 MG: 10 TABLET ORAL at 06:07

## 2021-07-02 RX ADMIN — POTASSIUM CHLORIDE 10 MEQ: 7.46 INJECTION, SOLUTION INTRAVENOUS at 11:07

## 2021-07-03 LAB
ALBUMIN SERPL BCP-MCNC: 2.5 G/DL (ref 3.5–5.2)
ALP SERPL-CCNC: 74 U/L (ref 55–135)
ALT SERPL W/O P-5'-P-CCNC: 23 U/L (ref 10–44)
ANION GAP SERPL CALC-SCNC: 13 MMOL/L (ref 8–16)
AST SERPL-CCNC: 19 U/L (ref 10–40)
BACTERIA SPEC AEROBE CULT: ABNORMAL
BASOPHILS # BLD AUTO: 0.02 K/UL (ref 0–0.2)
BASOPHILS NFR BLD: 0.2 % (ref 0–1.9)
BILIRUB SERPL-MCNC: 0.5 MG/DL (ref 0.1–1)
BUN SERPL-MCNC: 6 MG/DL (ref 8–23)
CALCIUM SERPL-MCNC: 9.4 MG/DL (ref 8.7–10.5)
CHLORIDE SERPL-SCNC: 101 MMOL/L (ref 95–110)
CO2 SERPL-SCNC: 22 MMOL/L (ref 23–29)
CREAT SERPL-MCNC: 0.7 MG/DL (ref 0.5–1.4)
DIFFERENTIAL METHOD: ABNORMAL
EOSINOPHIL # BLD AUTO: 0.1 K/UL (ref 0–0.5)
EOSINOPHIL NFR BLD: 0.9 % (ref 0–8)
ERYTHROCYTE [DISTWIDTH] IN BLOOD BY AUTOMATED COUNT: 13.4 % (ref 11.5–14.5)
EST. GFR  (AFRICAN AMERICAN): >60 ML/MIN/1.73 M^2
EST. GFR  (NON AFRICAN AMERICAN): >60 ML/MIN/1.73 M^2
GLUCOSE SERPL-MCNC: 79 MG/DL (ref 70–110)
HCT VFR BLD AUTO: 35 % (ref 40–54)
HGB BLD-MCNC: 11.7 G/DL (ref 14–18)
IMM GRANULOCYTES # BLD AUTO: 0.1 K/UL (ref 0–0.04)
IMM GRANULOCYTES NFR BLD AUTO: 0.9 % (ref 0–0.5)
LYMPHOCYTES # BLD AUTO: 0.8 K/UL (ref 1–4.8)
LYMPHOCYTES NFR BLD: 7.8 % (ref 18–48)
MAGNESIUM SERPL-MCNC: 2 MG/DL (ref 1.6–2.6)
MCH RBC QN AUTO: 31 PG (ref 27–31)
MCHC RBC AUTO-ENTMCNC: 33.4 G/DL (ref 32–36)
MCV RBC AUTO: 93 FL (ref 82–98)
MONOCYTES # BLD AUTO: 1.3 K/UL (ref 0.3–1)
MONOCYTES NFR BLD: 11.8 % (ref 4–15)
NEUTROPHILS # BLD AUTO: 8.3 K/UL (ref 1.8–7.7)
NEUTROPHILS NFR BLD: 78.4 % (ref 38–73)
NRBC BLD-RTO: 0 /100 WBC
PHOSPHATE SERPL-MCNC: 3.2 MG/DL (ref 2.7–4.5)
PLATELET # BLD AUTO: 212 K/UL (ref 150–450)
PMV BLD AUTO: 9.5 FL (ref 9.2–12.9)
POCT GLUCOSE: 84 MG/DL (ref 70–110)
POTASSIUM SERPL-SCNC: 4.6 MMOL/L (ref 3.5–5.1)
PROT SERPL-MCNC: 6.4 G/DL (ref 6–8.4)
RBC # BLD AUTO: 3.77 M/UL (ref 4.6–6.2)
SODIUM SERPL-SCNC: 136 MMOL/L (ref 136–145)
WBC # BLD AUTO: 10.58 K/UL (ref 3.9–12.7)

## 2021-07-03 PROCEDURE — 85025 COMPLETE CBC W/AUTO DIFF WBC: CPT | Performed by: STUDENT IN AN ORGANIZED HEALTH CARE EDUCATION/TRAINING PROGRAM

## 2021-07-03 PROCEDURE — 63600175 PHARM REV CODE 636 W HCPCS: Performed by: STUDENT IN AN ORGANIZED HEALTH CARE EDUCATION/TRAINING PROGRAM

## 2021-07-03 PROCEDURE — 20600001 HC STEP DOWN PRIVATE ROOM

## 2021-07-03 PROCEDURE — 25000003 PHARM REV CODE 250: Performed by: SURGERY

## 2021-07-03 PROCEDURE — 25000003 PHARM REV CODE 250: Performed by: STUDENT IN AN ORGANIZED HEALTH CARE EDUCATION/TRAINING PROGRAM

## 2021-07-03 PROCEDURE — 99233 PR SUBSEQUENT HOSPITAL CARE,LEVL III: ICD-10-PCS | Mod: ,,, | Performed by: SURGERY

## 2021-07-03 PROCEDURE — 99233 SBSQ HOSP IP/OBS HIGH 50: CPT | Mod: ,,, | Performed by: SURGERY

## 2021-07-03 PROCEDURE — 84100 ASSAY OF PHOSPHORUS: CPT | Performed by: STUDENT IN AN ORGANIZED HEALTH CARE EDUCATION/TRAINING PROGRAM

## 2021-07-03 PROCEDURE — 80053 COMPREHEN METABOLIC PANEL: CPT | Performed by: STUDENT IN AN ORGANIZED HEALTH CARE EDUCATION/TRAINING PROGRAM

## 2021-07-03 PROCEDURE — 83735 ASSAY OF MAGNESIUM: CPT | Performed by: STUDENT IN AN ORGANIZED HEALTH CARE EDUCATION/TRAINING PROGRAM

## 2021-07-03 RX ORDER — HYDROMORPHONE HYDROCHLORIDE 1 MG/ML
0.5 INJECTION, SOLUTION INTRAMUSCULAR; INTRAVENOUS; SUBCUTANEOUS
Status: DISCONTINUED | OUTPATIENT
Start: 2021-07-03 | End: 2021-07-13

## 2021-07-03 RX ADMIN — PIPERACILLIN SODIUM AND TAZOBACTAM SODIUM 4.5 G: 4; .5 INJECTION, POWDER, FOR SOLUTION INTRAVENOUS at 04:07

## 2021-07-03 RX ADMIN — OXYCODONE HYDROCHLORIDE 10 MG: 10 TABLET ORAL at 04:07

## 2021-07-03 RX ADMIN — HYDROMORPHONE HYDROCHLORIDE 0.5 MG: 1 INJECTION, SOLUTION INTRAMUSCULAR; INTRAVENOUS; SUBCUTANEOUS at 07:07

## 2021-07-03 RX ADMIN — ENOXAPARIN SODIUM 40 MG: 40 INJECTION SUBCUTANEOUS at 04:07

## 2021-07-03 RX ADMIN — PANTOPRAZOLE SODIUM 40 MG: 40 TABLET, DELAYED RELEASE ORAL at 04:07

## 2021-07-03 RX ADMIN — MUPIROCIN: 20 OINTMENT TOPICAL at 09:07

## 2021-07-03 RX ADMIN — PIPERACILLIN SODIUM AND TAZOBACTAM SODIUM 4.5 G: 4; .5 INJECTION, POWDER, FOR SOLUTION INTRAVENOUS at 09:07

## 2021-07-03 RX ADMIN — PIPERACILLIN SODIUM AND TAZOBACTAM SODIUM 4.5 G: 4; .5 INJECTION, POWDER, FOR SOLUTION INTRAVENOUS at 01:07

## 2021-07-03 RX ADMIN — OXYCODONE HYDROCHLORIDE 10 MG: 10 TABLET ORAL at 01:07

## 2021-07-03 RX ADMIN — OXYCODONE HYDROCHLORIDE 10 MG: 10 TABLET ORAL at 07:07

## 2021-07-03 RX ADMIN — OXYCODONE HYDROCHLORIDE 10 MG: 10 TABLET ORAL at 12:07

## 2021-07-03 RX ADMIN — HYDROMORPHONE HYDROCHLORIDE 0.5 MG: 1 INJECTION, SOLUTION INTRAMUSCULAR; INTRAVENOUS; SUBCUTANEOUS at 09:07

## 2021-07-04 LAB
ALBUMIN SERPL BCP-MCNC: 2.4 G/DL (ref 3.5–5.2)
ALP SERPL-CCNC: 75 U/L (ref 55–135)
ALT SERPL W/O P-5'-P-CCNC: 21 U/L (ref 10–44)
ANION GAP SERPL CALC-SCNC: 11 MMOL/L (ref 8–16)
AST SERPL-CCNC: 17 U/L (ref 10–40)
BACTERIA BLD CULT: ABNORMAL
BACTERIA SPEC ANAEROBE CULT: NORMAL
BASOPHILS # BLD AUTO: 0.06 K/UL (ref 0–0.2)
BASOPHILS NFR BLD: 0.6 % (ref 0–1.9)
BILIRUB SERPL-MCNC: 0.4 MG/DL (ref 0.1–1)
BUN SERPL-MCNC: 9 MG/DL (ref 8–23)
CALCIUM SERPL-MCNC: 9.3 MG/DL (ref 8.7–10.5)
CHLORIDE SERPL-SCNC: 99 MMOL/L (ref 95–110)
CO2 SERPL-SCNC: 26 MMOL/L (ref 23–29)
CREAT SERPL-MCNC: 0.7 MG/DL (ref 0.5–1.4)
DIFFERENTIAL METHOD: ABNORMAL
EOSINOPHIL # BLD AUTO: 0.2 K/UL (ref 0–0.5)
EOSINOPHIL NFR BLD: 1.8 % (ref 0–8)
ERYTHROCYTE [DISTWIDTH] IN BLOOD BY AUTOMATED COUNT: 13.3 % (ref 11.5–14.5)
EST. GFR  (AFRICAN AMERICAN): >60 ML/MIN/1.73 M^2
EST. GFR  (NON AFRICAN AMERICAN): >60 ML/MIN/1.73 M^2
GLUCOSE SERPL-MCNC: 96 MG/DL (ref 70–110)
HCT VFR BLD AUTO: 34.6 % (ref 40–54)
HGB BLD-MCNC: 11.4 G/DL (ref 14–18)
IMM GRANULOCYTES # BLD AUTO: 0.16 K/UL (ref 0–0.04)
IMM GRANULOCYTES NFR BLD AUTO: 1.6 % (ref 0–0.5)
LYMPHOCYTES # BLD AUTO: 1 K/UL (ref 1–4.8)
LYMPHOCYTES NFR BLD: 9.8 % (ref 18–48)
MAGNESIUM SERPL-MCNC: 2 MG/DL (ref 1.6–2.6)
MCH RBC QN AUTO: 29.9 PG (ref 27–31)
MCHC RBC AUTO-ENTMCNC: 32.9 G/DL (ref 32–36)
MCV RBC AUTO: 91 FL (ref 82–98)
MONOCYTES # BLD AUTO: 1.3 K/UL (ref 0.3–1)
MONOCYTES NFR BLD: 13.6 % (ref 4–15)
NEUTROPHILS # BLD AUTO: 7.1 K/UL (ref 1.8–7.7)
NEUTROPHILS NFR BLD: 72.6 % (ref 38–73)
NRBC BLD-RTO: 0 /100 WBC
PHOSPHATE SERPL-MCNC: 2.9 MG/DL (ref 2.7–4.5)
PLATELET # BLD AUTO: 260 K/UL (ref 150–450)
PMV BLD AUTO: 9.2 FL (ref 9.2–12.9)
POTASSIUM SERPL-SCNC: 4.1 MMOL/L (ref 3.5–5.1)
PROT SERPL-MCNC: 6.6 G/DL (ref 6–8.4)
RBC # BLD AUTO: 3.81 M/UL (ref 4.6–6.2)
SODIUM SERPL-SCNC: 136 MMOL/L (ref 136–145)
WBC # BLD AUTO: 9.84 K/UL (ref 3.9–12.7)

## 2021-07-04 PROCEDURE — 85025 COMPLETE CBC W/AUTO DIFF WBC: CPT | Performed by: STUDENT IN AN ORGANIZED HEALTH CARE EDUCATION/TRAINING PROGRAM

## 2021-07-04 PROCEDURE — 20600001 HC STEP DOWN PRIVATE ROOM

## 2021-07-04 PROCEDURE — 83735 ASSAY OF MAGNESIUM: CPT | Performed by: STUDENT IN AN ORGANIZED HEALTH CARE EDUCATION/TRAINING PROGRAM

## 2021-07-04 PROCEDURE — 80053 COMPREHEN METABOLIC PANEL: CPT | Performed by: STUDENT IN AN ORGANIZED HEALTH CARE EDUCATION/TRAINING PROGRAM

## 2021-07-04 PROCEDURE — 63600175 PHARM REV CODE 636 W HCPCS: Performed by: STUDENT IN AN ORGANIZED HEALTH CARE EDUCATION/TRAINING PROGRAM

## 2021-07-04 PROCEDURE — 84100 ASSAY OF PHOSPHORUS: CPT | Performed by: STUDENT IN AN ORGANIZED HEALTH CARE EDUCATION/TRAINING PROGRAM

## 2021-07-04 PROCEDURE — 25000003 PHARM REV CODE 250: Performed by: STUDENT IN AN ORGANIZED HEALTH CARE EDUCATION/TRAINING PROGRAM

## 2021-07-04 PROCEDURE — 36415 COLL VENOUS BLD VENIPUNCTURE: CPT | Performed by: STUDENT IN AN ORGANIZED HEALTH CARE EDUCATION/TRAINING PROGRAM

## 2021-07-04 RX ADMIN — PIPERACILLIN SODIUM AND TAZOBACTAM SODIUM 4.5 G: 4; .5 INJECTION, POWDER, FOR SOLUTION INTRAVENOUS at 09:07

## 2021-07-04 RX ADMIN — ENOXAPARIN SODIUM 40 MG: 40 INJECTION SUBCUTANEOUS at 05:07

## 2021-07-04 RX ADMIN — OXYCODONE HYDROCHLORIDE 10 MG: 10 TABLET ORAL at 05:07

## 2021-07-04 RX ADMIN — MUPIROCIN: 20 OINTMENT TOPICAL at 08:07

## 2021-07-04 RX ADMIN — PANTOPRAZOLE SODIUM 40 MG: 40 TABLET, DELAYED RELEASE ORAL at 05:07

## 2021-07-04 RX ADMIN — OXYCODONE HYDROCHLORIDE 10 MG: 10 TABLET ORAL at 12:07

## 2021-07-04 RX ADMIN — PIPERACILLIN SODIUM AND TAZOBACTAM SODIUM 4.5 G: 4; .5 INJECTION, POWDER, FOR SOLUTION INTRAVENOUS at 02:07

## 2021-07-04 RX ADMIN — PIPERACILLIN SODIUM AND TAZOBACTAM SODIUM 4.5 G: 4; .5 INJECTION, POWDER, FOR SOLUTION INTRAVENOUS at 05:07

## 2021-07-04 RX ADMIN — OXYCODONE HYDROCHLORIDE 10 MG: 10 TABLET ORAL at 09:07

## 2021-07-05 PROBLEM — A42.9: Status: ACTIVE | Noted: 2021-07-05

## 2021-07-05 LAB
ALBUMIN SERPL BCP-MCNC: 2.5 G/DL (ref 3.5–5.2)
ALP SERPL-CCNC: 73 U/L (ref 55–135)
ALT SERPL W/O P-5'-P-CCNC: 17 U/L (ref 10–44)
ANION GAP SERPL CALC-SCNC: 12 MMOL/L (ref 8–16)
AST SERPL-CCNC: 15 U/L (ref 10–40)
BACTERIA BLD CULT: NORMAL
BASOPHILS # BLD AUTO: 0.02 K/UL (ref 0–0.2)
BASOPHILS NFR BLD: 0.2 % (ref 0–1.9)
BILIRUB SERPL-MCNC: 0.4 MG/DL (ref 0.1–1)
BUN SERPL-MCNC: 11 MG/DL (ref 8–23)
CALCIUM SERPL-MCNC: 9.3 MG/DL (ref 8.7–10.5)
CHLORIDE SERPL-SCNC: 100 MMOL/L (ref 95–110)
CO2 SERPL-SCNC: 23 MMOL/L (ref 23–29)
CREAT SERPL-MCNC: 0.8 MG/DL (ref 0.5–1.4)
DIFFERENTIAL METHOD: ABNORMAL
EOSINOPHIL # BLD AUTO: 0.3 K/UL (ref 0–0.5)
EOSINOPHIL NFR BLD: 2.5 % (ref 0–8)
ERYTHROCYTE [DISTWIDTH] IN BLOOD BY AUTOMATED COUNT: 13.3 % (ref 11.5–14.5)
EST. GFR  (AFRICAN AMERICAN): >60 ML/MIN/1.73 M^2
EST. GFR  (NON AFRICAN AMERICAN): >60 ML/MIN/1.73 M^2
GLUCOSE SERPL-MCNC: 92 MG/DL (ref 70–110)
HCT VFR BLD AUTO: 36.2 % (ref 40–54)
HGB BLD-MCNC: 11.9 G/DL (ref 14–18)
IMM GRANULOCYTES # BLD AUTO: 0.43 K/UL (ref 0–0.04)
IMM GRANULOCYTES NFR BLD AUTO: 4.3 % (ref 0–0.5)
LYMPHOCYTES # BLD AUTO: 1.5 K/UL (ref 1–4.8)
LYMPHOCYTES NFR BLD: 15.2 % (ref 18–48)
MAGNESIUM SERPL-MCNC: 2.1 MG/DL (ref 1.6–2.6)
MCH RBC QN AUTO: 30.8 PG (ref 27–31)
MCHC RBC AUTO-ENTMCNC: 32.9 G/DL (ref 32–36)
MCV RBC AUTO: 94 FL (ref 82–98)
MONOCYTES # BLD AUTO: 1.3 K/UL (ref 0.3–1)
MONOCYTES NFR BLD: 12.7 % (ref 4–15)
NEUTROPHILS # BLD AUTO: 6.4 K/UL (ref 1.8–7.7)
NEUTROPHILS NFR BLD: 65.1 % (ref 38–73)
NRBC BLD-RTO: 0 /100 WBC
PHOSPHATE SERPL-MCNC: 3.7 MG/DL (ref 2.7–4.5)
PLATELET # BLD AUTO: 318 K/UL (ref 150–450)
PMV BLD AUTO: 8.8 FL (ref 9.2–12.9)
POTASSIUM SERPL-SCNC: 4.1 MMOL/L (ref 3.5–5.1)
PROT SERPL-MCNC: 6.9 G/DL (ref 6–8.4)
RBC # BLD AUTO: 3.86 M/UL (ref 4.6–6.2)
SODIUM SERPL-SCNC: 135 MMOL/L (ref 136–145)
WBC # BLD AUTO: 9.91 K/UL (ref 3.9–12.7)

## 2021-07-05 PROCEDURE — 63600175 PHARM REV CODE 636 W HCPCS: Performed by: STUDENT IN AN ORGANIZED HEALTH CARE EDUCATION/TRAINING PROGRAM

## 2021-07-05 PROCEDURE — 25000003 PHARM REV CODE 250: Performed by: STUDENT IN AN ORGANIZED HEALTH CARE EDUCATION/TRAINING PROGRAM

## 2021-07-05 PROCEDURE — 99223 1ST HOSP IP/OBS HIGH 75: CPT | Mod: ,,, | Performed by: INTERNAL MEDICINE

## 2021-07-05 PROCEDURE — 36415 COLL VENOUS BLD VENIPUNCTURE: CPT | Performed by: STUDENT IN AN ORGANIZED HEALTH CARE EDUCATION/TRAINING PROGRAM

## 2021-07-05 PROCEDURE — 83735 ASSAY OF MAGNESIUM: CPT | Performed by: STUDENT IN AN ORGANIZED HEALTH CARE EDUCATION/TRAINING PROGRAM

## 2021-07-05 PROCEDURE — 85025 COMPLETE CBC W/AUTO DIFF WBC: CPT | Performed by: STUDENT IN AN ORGANIZED HEALTH CARE EDUCATION/TRAINING PROGRAM

## 2021-07-05 PROCEDURE — 99223 PR INITIAL HOSPITAL CARE,LEVL III: ICD-10-PCS | Mod: ,,, | Performed by: INTERNAL MEDICINE

## 2021-07-05 PROCEDURE — 94761 N-INVAS EAR/PLS OXIMETRY MLT: CPT

## 2021-07-05 PROCEDURE — 84100 ASSAY OF PHOSPHORUS: CPT | Performed by: STUDENT IN AN ORGANIZED HEALTH CARE EDUCATION/TRAINING PROGRAM

## 2021-07-05 PROCEDURE — 80053 COMPREHEN METABOLIC PANEL: CPT | Performed by: STUDENT IN AN ORGANIZED HEALTH CARE EDUCATION/TRAINING PROGRAM

## 2021-07-05 PROCEDURE — 20600001 HC STEP DOWN PRIVATE ROOM

## 2021-07-05 RX ADMIN — ENOXAPARIN SODIUM 40 MG: 40 INJECTION SUBCUTANEOUS at 05:07

## 2021-07-05 RX ADMIN — PANTOPRAZOLE SODIUM 40 MG: 40 TABLET, DELAYED RELEASE ORAL at 05:07

## 2021-07-05 RX ADMIN — OXYCODONE HYDROCHLORIDE 10 MG: 10 TABLET ORAL at 09:07

## 2021-07-05 RX ADMIN — OXYCODONE HYDROCHLORIDE 10 MG: 10 TABLET ORAL at 05:07

## 2021-07-05 RX ADMIN — OXYCODONE HYDROCHLORIDE 10 MG: 10 TABLET ORAL at 02:07

## 2021-07-05 RX ADMIN — PIPERACILLIN SODIUM AND TAZOBACTAM SODIUM 4.5 G: 4; .5 INJECTION, POWDER, FOR SOLUTION INTRAVENOUS at 02:07

## 2021-07-05 RX ADMIN — PIPERACILLIN SODIUM AND TAZOBACTAM SODIUM 4.5 G: 4; .5 INJECTION, POWDER, FOR SOLUTION INTRAVENOUS at 05:07

## 2021-07-05 RX ADMIN — PIPERACILLIN SODIUM AND TAZOBACTAM SODIUM 4.5 G: 4; .5 INJECTION, POWDER, FOR SOLUTION INTRAVENOUS at 09:07

## 2021-07-06 LAB
ALBUMIN SERPL BCP-MCNC: 2.6 G/DL (ref 3.5–5.2)
ALP SERPL-CCNC: 72 U/L (ref 55–135)
ALT SERPL W/O P-5'-P-CCNC: 16 U/L (ref 10–44)
ANION GAP SERPL CALC-SCNC: 11 MMOL/L (ref 8–16)
APPEARANCE FLD: NORMAL
AST SERPL-CCNC: 17 U/L (ref 10–40)
BACTERIA BLD CULT: NORMAL
BACTERIA BLD CULT: NORMAL
BASOPHILS # BLD AUTO: ABNORMAL K/UL (ref 0–0.2)
BASOPHILS NFR BLD: 1 % (ref 0–1.9)
BILIRUB SERPL-MCNC: 0.4 MG/DL (ref 0.1–1)
BODY FLD TYPE: NORMAL
BUN SERPL-MCNC: 13 MG/DL (ref 8–23)
CALCIUM SERPL-MCNC: 9.9 MG/DL (ref 8.7–10.5)
CHLORIDE SERPL-SCNC: 101 MMOL/L (ref 95–110)
CO2 SERPL-SCNC: 26 MMOL/L (ref 23–29)
COLOR FLD: NORMAL
CREAT SERPL-MCNC: 0.9 MG/DL (ref 0.5–1.4)
DIFFERENTIAL METHOD: ABNORMAL
EOSINOPHIL # BLD AUTO: ABNORMAL K/UL (ref 0–0.5)
EOSINOPHIL NFR BLD: 2 % (ref 0–8)
ERYTHROCYTE [DISTWIDTH] IN BLOOD BY AUTOMATED COUNT: 13.6 % (ref 11.5–14.5)
EST. GFR  (AFRICAN AMERICAN): >60 ML/MIN/1.73 M^2
EST. GFR  (NON AFRICAN AMERICAN): >60 ML/MIN/1.73 M^2
GLUCOSE SERPL-MCNC: 94 MG/DL (ref 70–110)
GRAM STN SPEC: NORMAL
GRAM STN SPEC: NORMAL
HCT VFR BLD AUTO: 36.6 % (ref 40–54)
HGB BLD-MCNC: 12.1 G/DL (ref 14–18)
IMM GRANULOCYTES # BLD AUTO: ABNORMAL K/UL (ref 0–0.04)
IMM GRANULOCYTES NFR BLD AUTO: ABNORMAL % (ref 0–0.5)
LYMPHOCYTES # BLD AUTO: ABNORMAL K/UL (ref 1–4.8)
LYMPHOCYTES NFR BLD: 7 % (ref 18–48)
LYMPHOCYTES NFR FLD MANUAL: 2 %
MAGNESIUM SERPL-MCNC: 2.2 MG/DL (ref 1.6–2.6)
MCH RBC QN AUTO: 30.9 PG (ref 27–31)
MCHC RBC AUTO-ENTMCNC: 33.1 G/DL (ref 32–36)
MCV RBC AUTO: 93 FL (ref 82–98)
METAMYELOCYTES NFR BLD MANUAL: 1 %
MONOCYTES # BLD AUTO: ABNORMAL K/UL (ref 0.3–1)
MONOCYTES NFR BLD: 7 % (ref 4–15)
MONOS+MACROS NFR FLD MANUAL: 6 %
NEUTROPHILS NFR BLD: 81 % (ref 38–73)
NEUTROPHILS NFR FLD MANUAL: 92 %
NEUTS BAND NFR BLD MANUAL: 1 %
NRBC BLD-RTO: 0 /100 WBC
PHOSPHATE SERPL-MCNC: 3.6 MG/DL (ref 2.7–4.5)
PLATELET # BLD AUTO: 406 K/UL (ref 150–450)
PLATELET BLD QL SMEAR: ABNORMAL
PMV BLD AUTO: 8.7 FL (ref 9.2–12.9)
POTASSIUM SERPL-SCNC: 5 MMOL/L (ref 3.5–5.1)
PROT SERPL-MCNC: 7.3 G/DL (ref 6–8.4)
RBC # BLD AUTO: 3.92 M/UL (ref 4.6–6.2)
SODIUM SERPL-SCNC: 138 MMOL/L (ref 136–145)
WBC # BLD AUTO: 13.43 K/UL (ref 3.9–12.7)
WBC # FLD: 6620 /CU MM

## 2021-07-06 PROCEDURE — 25000003 PHARM REV CODE 250: Performed by: STUDENT IN AN ORGANIZED HEALTH CARE EDUCATION/TRAINING PROGRAM

## 2021-07-06 PROCEDURE — 80053 COMPREHEN METABOLIC PANEL: CPT | Performed by: STUDENT IN AN ORGANIZED HEALTH CARE EDUCATION/TRAINING PROGRAM

## 2021-07-06 PROCEDURE — 99232 PR SUBSEQUENT HOSPITAL CARE,LEVL II: ICD-10-PCS | Mod: ,,, | Performed by: INTERNAL MEDICINE

## 2021-07-06 PROCEDURE — 99232 SBSQ HOSP IP/OBS MODERATE 35: CPT | Mod: ,,, | Performed by: INTERNAL MEDICINE

## 2021-07-06 PROCEDURE — 89051 BODY FLUID CELL COUNT: CPT | Performed by: SURGERY

## 2021-07-06 PROCEDURE — 87075 CULTR BACTERIA EXCEPT BLOOD: CPT | Performed by: SURGERY

## 2021-07-06 PROCEDURE — 20600001 HC STEP DOWN PRIVATE ROOM

## 2021-07-06 PROCEDURE — 63600175 PHARM REV CODE 636 W HCPCS: Performed by: STUDENT IN AN ORGANIZED HEALTH CARE EDUCATION/TRAINING PROGRAM

## 2021-07-06 PROCEDURE — 36415 COLL VENOUS BLD VENIPUNCTURE: CPT | Performed by: STUDENT IN AN ORGANIZED HEALTH CARE EDUCATION/TRAINING PROGRAM

## 2021-07-06 PROCEDURE — 87070 CULTURE OTHR SPECIMN AEROBIC: CPT | Performed by: SURGERY

## 2021-07-06 PROCEDURE — 85007 BL SMEAR W/DIFF WBC COUNT: CPT | Performed by: STUDENT IN AN ORGANIZED HEALTH CARE EDUCATION/TRAINING PROGRAM

## 2021-07-06 PROCEDURE — 83735 ASSAY OF MAGNESIUM: CPT | Performed by: STUDENT IN AN ORGANIZED HEALTH CARE EDUCATION/TRAINING PROGRAM

## 2021-07-06 PROCEDURE — 85027 COMPLETE CBC AUTOMATED: CPT | Performed by: STUDENT IN AN ORGANIZED HEALTH CARE EDUCATION/TRAINING PROGRAM

## 2021-07-06 PROCEDURE — 87205 SMEAR GRAM STAIN: CPT | Performed by: SURGERY

## 2021-07-06 PROCEDURE — 63600175 PHARM REV CODE 636 W HCPCS: Performed by: RADIOLOGY

## 2021-07-06 PROCEDURE — 84100 ASSAY OF PHOSPHORUS: CPT | Performed by: STUDENT IN AN ORGANIZED HEALTH CARE EDUCATION/TRAINING PROGRAM

## 2021-07-06 PROCEDURE — 87102 FUNGUS ISOLATION CULTURE: CPT | Performed by: SURGERY

## 2021-07-06 RX ORDER — MIDAZOLAM HYDROCHLORIDE 1 MG/ML
INJECTION INTRAMUSCULAR; INTRAVENOUS CODE/TRAUMA/SEDATION MEDICATION
Status: COMPLETED | OUTPATIENT
Start: 2021-07-06 | End: 2021-07-06

## 2021-07-06 RX ORDER — POLYETHYLENE GLYCOL 3350 17 G/17G
17 POWDER, FOR SOLUTION ORAL DAILY
Status: DISCONTINUED | OUTPATIENT
Start: 2021-07-06 | End: 2021-07-14 | Stop reason: HOSPADM

## 2021-07-06 RX ORDER — FENTANYL CITRATE 50 UG/ML
INJECTION, SOLUTION INTRAMUSCULAR; INTRAVENOUS CODE/TRAUMA/SEDATION MEDICATION
Status: COMPLETED | OUTPATIENT
Start: 2021-07-06 | End: 2021-07-06

## 2021-07-06 RX ADMIN — OXYCODONE HYDROCHLORIDE 10 MG: 10 TABLET ORAL at 04:07

## 2021-07-06 RX ADMIN — PIPERACILLIN SODIUM AND TAZOBACTAM SODIUM 4.5 G: 4; .5 INJECTION, POWDER, FOR SOLUTION INTRAVENOUS at 02:07

## 2021-07-06 RX ADMIN — POLYETHYLENE GLYCOL 3350 17 G: 17 POWDER, FOR SOLUTION ORAL at 04:07

## 2021-07-06 RX ADMIN — PIPERACILLIN SODIUM AND TAZOBACTAM SODIUM 4.5 G: 4; .5 INJECTION, POWDER, FOR SOLUTION INTRAVENOUS at 05:07

## 2021-07-06 RX ADMIN — MIDAZOLAM HYDROCHLORIDE 1 MG: 1 INJECTION INTRAMUSCULAR; INTRAVENOUS at 09:07

## 2021-07-06 RX ADMIN — HYDROMORPHONE HYDROCHLORIDE 0.5 MG: 1 INJECTION, SOLUTION INTRAMUSCULAR; INTRAVENOUS; SUBCUTANEOUS at 12:07

## 2021-07-06 RX ADMIN — FENTANYL CITRATE 50 MCG: 50 INJECTION, SOLUTION INTRAMUSCULAR; INTRAVENOUS at 09:07

## 2021-07-06 RX ADMIN — FENTANYL CITRATE 50 MCG: 50 INJECTION, SOLUTION INTRAMUSCULAR; INTRAVENOUS at 08:07

## 2021-07-06 RX ADMIN — OXYCODONE HYDROCHLORIDE 10 MG: 10 TABLET ORAL at 10:07

## 2021-07-06 RX ADMIN — PIPERACILLIN SODIUM AND TAZOBACTAM SODIUM 4.5 G: 4; .5 INJECTION, POWDER, FOR SOLUTION INTRAVENOUS at 10:07

## 2021-07-06 RX ADMIN — PANTOPRAZOLE SODIUM 40 MG: 40 TABLET, DELAYED RELEASE ORAL at 04:07

## 2021-07-06 RX ADMIN — MIDAZOLAM HYDROCHLORIDE 1 MG: 1 INJECTION INTRAMUSCULAR; INTRAVENOUS at 08:07

## 2021-07-06 RX ADMIN — ENOXAPARIN SODIUM 40 MG: 40 INJECTION SUBCUTANEOUS at 05:07

## 2021-07-07 LAB
ALBUMIN SERPL BCP-MCNC: 2.7 G/DL (ref 3.5–5.2)
ALP SERPL-CCNC: 73 U/L (ref 55–135)
ALT SERPL W/O P-5'-P-CCNC: 17 U/L (ref 10–44)
ANION GAP SERPL CALC-SCNC: 12 MMOL/L (ref 8–16)
AST SERPL-CCNC: 19 U/L (ref 10–40)
BASOPHILS # BLD AUTO: 0.08 K/UL (ref 0–0.2)
BASOPHILS NFR BLD: 0.8 % (ref 0–1.9)
BILIRUB SERPL-MCNC: 0.4 MG/DL (ref 0.1–1)
BUN SERPL-MCNC: 14 MG/DL (ref 8–23)
CALCIUM SERPL-MCNC: 9.9 MG/DL (ref 8.7–10.5)
CHLORIDE SERPL-SCNC: 103 MMOL/L (ref 95–110)
CO2 SERPL-SCNC: 23 MMOL/L (ref 23–29)
CREAT SERPL-MCNC: 0.8 MG/DL (ref 0.5–1.4)
DIFFERENTIAL METHOD: ABNORMAL
EOSINOPHIL # BLD AUTO: 0.2 K/UL (ref 0–0.5)
EOSINOPHIL NFR BLD: 2.4 % (ref 0–8)
ERYTHROCYTE [DISTWIDTH] IN BLOOD BY AUTOMATED COUNT: 13.3 % (ref 11.5–14.5)
EST. GFR  (AFRICAN AMERICAN): >60 ML/MIN/1.73 M^2
EST. GFR  (NON AFRICAN AMERICAN): >60 ML/MIN/1.73 M^2
GLUCOSE SERPL-MCNC: 95 MG/DL (ref 70–110)
HCT VFR BLD AUTO: 36.9 % (ref 40–54)
HGB BLD-MCNC: 12.3 G/DL (ref 14–18)
IMM GRANULOCYTES # BLD AUTO: 0.45 K/UL (ref 0–0.04)
IMM GRANULOCYTES NFR BLD AUTO: 4.5 % (ref 0–0.5)
LYMPHOCYTES # BLD AUTO: 1.3 K/UL (ref 1–4.8)
LYMPHOCYTES NFR BLD: 13.3 % (ref 18–48)
MAGNESIUM SERPL-MCNC: 2.3 MG/DL (ref 1.6–2.6)
MCH RBC QN AUTO: 30.3 PG (ref 27–31)
MCHC RBC AUTO-ENTMCNC: 33.3 G/DL (ref 32–36)
MCV RBC AUTO: 91 FL (ref 82–98)
MONOCYTES # BLD AUTO: 0.9 K/UL (ref 0.3–1)
MONOCYTES NFR BLD: 8.5 % (ref 4–15)
NEUTROPHILS # BLD AUTO: 7.1 K/UL (ref 1.8–7.7)
NEUTROPHILS NFR BLD: 70.5 % (ref 38–73)
NRBC BLD-RTO: 0 /100 WBC
PHOSPHATE SERPL-MCNC: 3 MG/DL (ref 2.7–4.5)
PLATELET # BLD AUTO: 470 K/UL (ref 150–450)
PMV BLD AUTO: 8.5 FL (ref 9.2–12.9)
POTASSIUM SERPL-SCNC: 4.7 MMOL/L (ref 3.5–5.1)
PROT SERPL-MCNC: 7.4 G/DL (ref 6–8.4)
RBC # BLD AUTO: 4.06 M/UL (ref 4.6–6.2)
SODIUM SERPL-SCNC: 138 MMOL/L (ref 136–145)
WBC # BLD AUTO: 10.1 K/UL (ref 3.9–12.7)

## 2021-07-07 PROCEDURE — 25000003 PHARM REV CODE 250: Performed by: NURSE PRACTITIONER

## 2021-07-07 PROCEDURE — 63600175 PHARM REV CODE 636 W HCPCS: Performed by: STUDENT IN AN ORGANIZED HEALTH CARE EDUCATION/TRAINING PROGRAM

## 2021-07-07 PROCEDURE — 20600001 HC STEP DOWN PRIVATE ROOM

## 2021-07-07 PROCEDURE — 85025 COMPLETE CBC W/AUTO DIFF WBC: CPT | Performed by: STUDENT IN AN ORGANIZED HEALTH CARE EDUCATION/TRAINING PROGRAM

## 2021-07-07 PROCEDURE — C1751 CATH, INF, PER/CENT/MIDLINE: HCPCS

## 2021-07-07 PROCEDURE — 25000003 PHARM REV CODE 250: Performed by: STUDENT IN AN ORGANIZED HEALTH CARE EDUCATION/TRAINING PROGRAM

## 2021-07-07 PROCEDURE — 36573 INSJ PICC RS&I 5 YR+: CPT

## 2021-07-07 PROCEDURE — A4216 STERILE WATER/SALINE, 10 ML: HCPCS | Performed by: SURGERY

## 2021-07-07 PROCEDURE — 36415 COLL VENOUS BLD VENIPUNCTURE: CPT | Performed by: STUDENT IN AN ORGANIZED HEALTH CARE EDUCATION/TRAINING PROGRAM

## 2021-07-07 PROCEDURE — 99232 SBSQ HOSP IP/OBS MODERATE 35: CPT | Mod: ,,, | Performed by: INTERNAL MEDICINE

## 2021-07-07 PROCEDURE — 84100 ASSAY OF PHOSPHORUS: CPT | Performed by: STUDENT IN AN ORGANIZED HEALTH CARE EDUCATION/TRAINING PROGRAM

## 2021-07-07 PROCEDURE — 63600175 PHARM REV CODE 636 W HCPCS: Performed by: NURSE PRACTITIONER

## 2021-07-07 PROCEDURE — 99232 PR SUBSEQUENT HOSPITAL CARE,LEVL II: ICD-10-PCS | Mod: ,,, | Performed by: INTERNAL MEDICINE

## 2021-07-07 PROCEDURE — 25000003 PHARM REV CODE 250: Performed by: SURGERY

## 2021-07-07 PROCEDURE — 80053 COMPREHEN METABOLIC PANEL: CPT | Performed by: STUDENT IN AN ORGANIZED HEALTH CARE EDUCATION/TRAINING PROGRAM

## 2021-07-07 PROCEDURE — 83735 ASSAY OF MAGNESIUM: CPT | Performed by: STUDENT IN AN ORGANIZED HEALTH CARE EDUCATION/TRAINING PROGRAM

## 2021-07-07 PROCEDURE — 76937 US GUIDE VASCULAR ACCESS: CPT

## 2021-07-07 RX ORDER — SODIUM CHLORIDE 0.9 % (FLUSH) 0.9 %
10 SYRINGE (ML) INJECTION EVERY 6 HOURS
Status: DISCONTINUED | OUTPATIENT
Start: 2021-07-07 | End: 2021-07-14 | Stop reason: HOSPADM

## 2021-07-07 RX ORDER — SODIUM CHLORIDE 0.9 % (FLUSH) 0.9 %
10 SYRINGE (ML) INJECTION
Status: DISCONTINUED | OUTPATIENT
Start: 2021-07-07 | End: 2021-07-14 | Stop reason: HOSPADM

## 2021-07-07 RX ADMIN — PANTOPRAZOLE SODIUM 40 MG: 40 TABLET, DELAYED RELEASE ORAL at 05:07

## 2021-07-07 RX ADMIN — ENOXAPARIN SODIUM 40 MG: 40 INJECTION SUBCUTANEOUS at 05:07

## 2021-07-07 RX ADMIN — SODIUM CHLORIDE 3 G: 9 INJECTION, SOLUTION INTRAVENOUS at 11:07

## 2021-07-07 RX ADMIN — PANTOPRAZOLE SODIUM 40 MG: 40 TABLET, DELAYED RELEASE ORAL at 06:07

## 2021-07-07 RX ADMIN — OXYCODONE HYDROCHLORIDE 10 MG: 10 TABLET ORAL at 10:07

## 2021-07-07 RX ADMIN — Medication 10 ML: at 06:07

## 2021-07-07 RX ADMIN — POLYETHYLENE GLYCOL 3350 17 G: 17 POWDER, FOR SOLUTION ORAL at 09:07

## 2021-07-07 RX ADMIN — HYDROMORPHONE HYDROCHLORIDE 0.5 MG: 1 INJECTION, SOLUTION INTRAMUSCULAR; INTRAVENOUS; SUBCUTANEOUS at 10:07

## 2021-07-07 RX ADMIN — PIPERACILLIN SODIUM AND TAZOBACTAM SODIUM 4.5 G: 4; .5 INJECTION, POWDER, FOR SOLUTION INTRAVENOUS at 06:07

## 2021-07-07 RX ADMIN — Medication 10 ML: at 11:07

## 2021-07-07 RX ADMIN — ONDANSETRON 4 MG: 2 INJECTION INTRAMUSCULAR; INTRAVENOUS at 10:07

## 2021-07-07 RX ADMIN — PIPERACILLIN SODIUM AND TAZOBACTAM SODIUM 4.5 G: 4; .5 INJECTION, POWDER, FOR SOLUTION INTRAVENOUS at 02:07

## 2021-07-08 LAB
ALBUMIN SERPL BCP-MCNC: 2.9 G/DL (ref 3.5–5.2)
ALP SERPL-CCNC: 75 U/L (ref 55–135)
ALT SERPL W/O P-5'-P-CCNC: 17 U/L (ref 10–44)
ANION GAP SERPL CALC-SCNC: 11 MMOL/L (ref 8–16)
AST SERPL-CCNC: 18 U/L (ref 10–40)
BASOPHILS # BLD AUTO: 0.06 K/UL (ref 0–0.2)
BASOPHILS # BLD AUTO: 0.09 K/UL (ref 0–0.2)
BASOPHILS NFR BLD: 0.6 % (ref 0–1.9)
BASOPHILS NFR BLD: 0.7 % (ref 0–1.9)
BILIRUB SERPL-MCNC: 0.2 MG/DL (ref 0.1–1)
BUN SERPL-MCNC: 26 MG/DL (ref 8–23)
CALCIUM SERPL-MCNC: 10 MG/DL (ref 8.7–10.5)
CHLORIDE SERPL-SCNC: 100 MMOL/L (ref 95–110)
CO2 SERPL-SCNC: 25 MMOL/L (ref 23–29)
CREAT SERPL-MCNC: 0.8 MG/DL (ref 0.5–1.4)
DIFFERENTIAL METHOD: ABNORMAL
DIFFERENTIAL METHOD: ABNORMAL
EOSINOPHIL # BLD AUTO: 0.1 K/UL (ref 0–0.5)
EOSINOPHIL # BLD AUTO: 0.1 K/UL (ref 0–0.5)
EOSINOPHIL NFR BLD: 0.9 % (ref 0–8)
EOSINOPHIL NFR BLD: 1.5 % (ref 0–8)
ERYTHROCYTE [DISTWIDTH] IN BLOOD BY AUTOMATED COUNT: 13.3 % (ref 11.5–14.5)
ERYTHROCYTE [DISTWIDTH] IN BLOOD BY AUTOMATED COUNT: 13.4 % (ref 11.5–14.5)
EST. GFR  (AFRICAN AMERICAN): >60 ML/MIN/1.73 M^2
EST. GFR  (NON AFRICAN AMERICAN): >60 ML/MIN/1.73 M^2
GLUCOSE SERPL-MCNC: 117 MG/DL (ref 70–110)
HCT VFR BLD AUTO: 31.6 % (ref 40–54)
HCT VFR BLD AUTO: 37.4 % (ref 40–54)
HGB BLD-MCNC: 10.5 G/DL (ref 14–18)
HGB BLD-MCNC: 12.2 G/DL (ref 14–18)
IMM GRANULOCYTES # BLD AUTO: 0.34 K/UL (ref 0–0.04)
IMM GRANULOCYTES # BLD AUTO: 0.5 K/UL (ref 0–0.04)
IMM GRANULOCYTES NFR BLD AUTO: 3.6 % (ref 0–0.5)
IMM GRANULOCYTES NFR BLD AUTO: 3.8 % (ref 0–0.5)
LYMPHOCYTES # BLD AUTO: 1.3 K/UL (ref 1–4.8)
LYMPHOCYTES # BLD AUTO: 1.3 K/UL (ref 1–4.8)
LYMPHOCYTES NFR BLD: 13.5 % (ref 18–48)
LYMPHOCYTES NFR BLD: 9.8 % (ref 18–48)
MAGNESIUM SERPL-MCNC: 2.2 MG/DL (ref 1.6–2.6)
MCH RBC QN AUTO: 30.3 PG (ref 27–31)
MCH RBC QN AUTO: 30.6 PG (ref 27–31)
MCHC RBC AUTO-ENTMCNC: 32.6 G/DL (ref 32–36)
MCHC RBC AUTO-ENTMCNC: 33.2 G/DL (ref 32–36)
MCV RBC AUTO: 92 FL (ref 82–98)
MCV RBC AUTO: 93 FL (ref 82–98)
MONOCYTES # BLD AUTO: 0.9 K/UL (ref 0.3–1)
MONOCYTES # BLD AUTO: 1 K/UL (ref 0.3–1)
MONOCYTES NFR BLD: 7.5 % (ref 4–15)
MONOCYTES NFR BLD: 9.1 % (ref 4–15)
NEUTROPHILS # BLD AUTO: 10.2 K/UL (ref 1.8–7.7)
NEUTROPHILS # BLD AUTO: 6.7 K/UL (ref 1.8–7.7)
NEUTROPHILS NFR BLD: 71.7 % (ref 38–73)
NEUTROPHILS NFR BLD: 77.3 % (ref 38–73)
NRBC BLD-RTO: 0 /100 WBC
NRBC BLD-RTO: 0 /100 WBC
PHOSPHATE SERPL-MCNC: 2.8 MG/DL (ref 2.7–4.5)
PLATELET # BLD AUTO: 481 K/UL (ref 150–450)
PLATELET # BLD AUTO: 553 K/UL (ref 150–450)
PMV BLD AUTO: 8 FL (ref 9.2–12.9)
PMV BLD AUTO: 8.1 FL (ref 9.2–12.9)
POTASSIUM SERPL-SCNC: 5.1 MMOL/L (ref 3.5–5.1)
PROT SERPL-MCNC: 7.8 G/DL (ref 6–8.4)
RBC # BLD AUTO: 3.43 M/UL (ref 4.6–6.2)
RBC # BLD AUTO: 4.02 M/UL (ref 4.6–6.2)
SODIUM SERPL-SCNC: 136 MMOL/L (ref 136–145)
WBC # BLD AUTO: 13.21 K/UL (ref 3.9–12.7)
WBC # BLD AUTO: 9.39 K/UL (ref 3.9–12.7)

## 2021-07-08 PROCEDURE — 20600001 HC STEP DOWN PRIVATE ROOM

## 2021-07-08 PROCEDURE — 25000003 PHARM REV CODE 250: Performed by: SURGERY

## 2021-07-08 PROCEDURE — 25000003 PHARM REV CODE 250: Performed by: STUDENT IN AN ORGANIZED HEALTH CARE EDUCATION/TRAINING PROGRAM

## 2021-07-08 PROCEDURE — 84100 ASSAY OF PHOSPHORUS: CPT | Performed by: STUDENT IN AN ORGANIZED HEALTH CARE EDUCATION/TRAINING PROGRAM

## 2021-07-08 PROCEDURE — A4216 STERILE WATER/SALINE, 10 ML: HCPCS | Performed by: SURGERY

## 2021-07-08 PROCEDURE — 99232 PR SUBSEQUENT HOSPITAL CARE,LEVL II: ICD-10-PCS | Mod: ,,, | Performed by: INTERNAL MEDICINE

## 2021-07-08 PROCEDURE — 63600175 PHARM REV CODE 636 W HCPCS: Performed by: STUDENT IN AN ORGANIZED HEALTH CARE EDUCATION/TRAINING PROGRAM

## 2021-07-08 PROCEDURE — 80053 COMPREHEN METABOLIC PANEL: CPT | Performed by: STUDENT IN AN ORGANIZED HEALTH CARE EDUCATION/TRAINING PROGRAM

## 2021-07-08 PROCEDURE — 83735 ASSAY OF MAGNESIUM: CPT | Performed by: STUDENT IN AN ORGANIZED HEALTH CARE EDUCATION/TRAINING PROGRAM

## 2021-07-08 PROCEDURE — 99232 SBSQ HOSP IP/OBS MODERATE 35: CPT | Mod: ,,, | Performed by: INTERNAL MEDICINE

## 2021-07-08 PROCEDURE — 63600175 PHARM REV CODE 636 W HCPCS: Performed by: NURSE PRACTITIONER

## 2021-07-08 PROCEDURE — 36415 COLL VENOUS BLD VENIPUNCTURE: CPT | Performed by: STUDENT IN AN ORGANIZED HEALTH CARE EDUCATION/TRAINING PROGRAM

## 2021-07-08 PROCEDURE — 25000003 PHARM REV CODE 250: Performed by: NURSE PRACTITIONER

## 2021-07-08 PROCEDURE — 85025 COMPLETE CBC W/AUTO DIFF WBC: CPT | Mod: 91 | Performed by: NURSE PRACTITIONER

## 2021-07-08 PROCEDURE — 85025 COMPLETE CBC W/AUTO DIFF WBC: CPT | Performed by: STUDENT IN AN ORGANIZED HEALTH CARE EDUCATION/TRAINING PROGRAM

## 2021-07-08 RX ORDER — OXYCODONE HYDROCHLORIDE 5 MG/1
5 TABLET ORAL EVERY 4 HOURS PRN
Qty: 28 TABLET | Refills: 0 | Status: SHIPPED | OUTPATIENT
Start: 2021-07-08 | End: 2021-08-16 | Stop reason: SDUPTHER

## 2021-07-08 RX ORDER — POLYETHYLENE GLYCOL 3350 17 G/17G
17 POWDER, FOR SOLUTION ORAL DAILY
Qty: 510 G | Refills: 0 | Status: SHIPPED | OUTPATIENT
Start: 2021-07-09 | End: 2021-08-13

## 2021-07-08 RX ORDER — LIDOCAINE 50 MG/G
1 PATCH TOPICAL
Status: DISCONTINUED | OUTPATIENT
Start: 2021-07-08 | End: 2021-07-14 | Stop reason: HOSPADM

## 2021-07-08 RX ADMIN — Medication 10 ML: at 06:07

## 2021-07-08 RX ADMIN — LIDOCAINE 1 PATCH: 50 PATCH CUTANEOUS at 09:07

## 2021-07-08 RX ADMIN — PANTOPRAZOLE SODIUM 40 MG: 40 TABLET, DELAYED RELEASE ORAL at 04:07

## 2021-07-08 RX ADMIN — SODIUM CHLORIDE, SODIUM LACTATE, POTASSIUM CHLORIDE, AND CALCIUM CHLORIDE 500 ML: .6; .31; .03; .02 INJECTION, SOLUTION INTRAVENOUS at 01:07

## 2021-07-08 RX ADMIN — ENOXAPARIN SODIUM 40 MG: 40 INJECTION SUBCUTANEOUS at 04:07

## 2021-07-08 RX ADMIN — AMPICILLIN SODIUM AND SULBACTAM SODIUM 3 G: 2; 1 INJECTION, POWDER, FOR SOLUTION INTRAMUSCULAR; INTRAVENOUS at 11:07

## 2021-07-08 RX ADMIN — Medication 10 ML: at 11:07

## 2021-07-08 RX ADMIN — OXYCODONE HYDROCHLORIDE 10 MG: 10 TABLET ORAL at 02:07

## 2021-07-08 RX ADMIN — Medication 10 ML: at 05:07

## 2021-07-08 RX ADMIN — SODIUM CHLORIDE 3 G: 9 INJECTION, SOLUTION INTRAVENOUS at 05:07

## 2021-07-08 RX ADMIN — OXYCODONE HYDROCHLORIDE 10 MG: 10 TABLET ORAL at 09:07

## 2021-07-08 RX ADMIN — PANTOPRAZOLE SODIUM 40 MG: 40 TABLET, DELAYED RELEASE ORAL at 05:07

## 2021-07-08 RX ADMIN — AMPICILLIN SODIUM AND SULBACTAM SODIUM 3 G: 2; 1 INJECTION, POWDER, FOR SOLUTION INTRAMUSCULAR; INTRAVENOUS at 05:07

## 2021-07-08 RX ADMIN — HYDROMORPHONE HYDROCHLORIDE 0.5 MG: 1 INJECTION, SOLUTION INTRAMUSCULAR; INTRAVENOUS; SUBCUTANEOUS at 03:07

## 2021-07-09 LAB
ALBUMIN SERPL BCP-MCNC: 2.6 G/DL (ref 3.5–5.2)
ALP SERPL-CCNC: 69 U/L (ref 55–135)
ALT SERPL W/O P-5'-P-CCNC: 13 U/L (ref 10–44)
ANION GAP SERPL CALC-SCNC: 10 MMOL/L (ref 8–16)
ANISOCYTOSIS BLD QL SMEAR: SLIGHT
AST SERPL-CCNC: 18 U/L (ref 10–40)
BACTERIA SPEC AEROBE CULT: NO GROWTH
BASOPHILS # BLD AUTO: 0.07 K/UL (ref 0–0.2)
BASOPHILS # BLD AUTO: ABNORMAL K/UL (ref 0–0.2)
BASOPHILS NFR BLD: 0 % (ref 0–1.9)
BASOPHILS NFR BLD: 0.9 % (ref 0–1.9)
BILIRUB SERPL-MCNC: 0.2 MG/DL (ref 0.1–1)
BUN SERPL-MCNC: 24 MG/DL (ref 8–23)
CALCIUM SERPL-MCNC: 9.3 MG/DL (ref 8.7–10.5)
CHLORIDE SERPL-SCNC: 102 MMOL/L (ref 95–110)
CO2 SERPL-SCNC: 25 MMOL/L (ref 23–29)
CREAT SERPL-MCNC: 0.8 MG/DL (ref 0.5–1.4)
DIFFERENTIAL METHOD: ABNORMAL
DIFFERENTIAL METHOD: ABNORMAL
EOSINOPHIL # BLD AUTO: 0.2 K/UL (ref 0–0.5)
EOSINOPHIL # BLD AUTO: ABNORMAL K/UL (ref 0–0.5)
EOSINOPHIL NFR BLD: 2 % (ref 0–8)
EOSINOPHIL NFR BLD: 2.2 % (ref 0–8)
ERYTHROCYTE [DISTWIDTH] IN BLOOD BY AUTOMATED COUNT: 13.3 % (ref 11.5–14.5)
ERYTHROCYTE [DISTWIDTH] IN BLOOD BY AUTOMATED COUNT: 13.4 % (ref 11.5–14.5)
EST. GFR  (AFRICAN AMERICAN): >60 ML/MIN/1.73 M^2
EST. GFR  (NON AFRICAN AMERICAN): >60 ML/MIN/1.73 M^2
GLUCOSE SERPL-MCNC: 92 MG/DL (ref 70–110)
HCT VFR BLD AUTO: 30.3 % (ref 40–54)
HCT VFR BLD AUTO: 30.6 % (ref 40–54)
HGB BLD-MCNC: 10 G/DL (ref 14–18)
HGB BLD-MCNC: 10.1 G/DL (ref 14–18)
HYPOCHROMIA BLD QL SMEAR: ABNORMAL
IMM GRANULOCYTES # BLD AUTO: 0.38 K/UL (ref 0–0.04)
IMM GRANULOCYTES # BLD AUTO: ABNORMAL K/UL (ref 0–0.04)
IMM GRANULOCYTES NFR BLD AUTO: 4.8 % (ref 0–0.5)
IMM GRANULOCYTES NFR BLD AUTO: ABNORMAL % (ref 0–0.5)
LYMPHOCYTES # BLD AUTO: 1.3 K/UL (ref 1–4.8)
LYMPHOCYTES # BLD AUTO: ABNORMAL K/UL (ref 1–4.8)
LYMPHOCYTES NFR BLD: 15.9 % (ref 18–48)
LYMPHOCYTES NFR BLD: 9 % (ref 18–48)
MAGNESIUM SERPL-MCNC: 2.1 MG/DL (ref 1.6–2.6)
MCH RBC QN AUTO: 30.4 PG (ref 27–31)
MCH RBC QN AUTO: 31.1 PG (ref 27–31)
MCHC RBC AUTO-ENTMCNC: 33 G/DL (ref 32–36)
MCHC RBC AUTO-ENTMCNC: 33 G/DL (ref 32–36)
MCV RBC AUTO: 92 FL (ref 82–98)
MCV RBC AUTO: 94 FL (ref 82–98)
MONOCYTES # BLD AUTO: 0.7 K/UL (ref 0.3–1)
MONOCYTES # BLD AUTO: ABNORMAL K/UL (ref 0.3–1)
MONOCYTES NFR BLD: 1 % (ref 4–15)
MONOCYTES NFR BLD: 8.5 % (ref 4–15)
NEUTROPHILS # BLD AUTO: 5.3 K/UL (ref 1.8–7.7)
NEUTROPHILS NFR BLD: 67.7 % (ref 38–73)
NEUTROPHILS NFR BLD: 88 % (ref 38–73)
NRBC BLD-RTO: 0 /100 WBC
NRBC BLD-RTO: 0 /100 WBC
OVALOCYTES BLD QL SMEAR: ABNORMAL
PHOSPHATE SERPL-MCNC: 3.3 MG/DL (ref 2.7–4.5)
PLATELET # BLD AUTO: 473 K/UL (ref 150–450)
PLATELET # BLD AUTO: 478 K/UL (ref 150–450)
PMV BLD AUTO: 7.9 FL (ref 9.2–12.9)
PMV BLD AUTO: 8.2 FL (ref 9.2–12.9)
POIKILOCYTOSIS BLD QL SMEAR: SLIGHT
POLYCHROMASIA BLD QL SMEAR: ABNORMAL
POTASSIUM SERPL-SCNC: 4.2 MMOL/L (ref 3.5–5.1)
PROT SERPL-MCNC: 6.7 G/DL (ref 6–8.4)
RBC # BLD AUTO: 3.25 M/UL (ref 4.6–6.2)
RBC # BLD AUTO: 3.29 M/UL (ref 4.6–6.2)
SODIUM SERPL-SCNC: 137 MMOL/L (ref 136–145)
WBC # BLD AUTO: 7.85 K/UL (ref 3.9–12.7)
WBC # BLD AUTO: 7.93 K/UL (ref 3.9–12.7)

## 2021-07-09 PROCEDURE — 63600175 PHARM REV CODE 636 W HCPCS: Performed by: STUDENT IN AN ORGANIZED HEALTH CARE EDUCATION/TRAINING PROGRAM

## 2021-07-09 PROCEDURE — 80053 COMPREHEN METABOLIC PANEL: CPT | Performed by: STUDENT IN AN ORGANIZED HEALTH CARE EDUCATION/TRAINING PROGRAM

## 2021-07-09 PROCEDURE — 25000003 PHARM REV CODE 250: Performed by: STUDENT IN AN ORGANIZED HEALTH CARE EDUCATION/TRAINING PROGRAM

## 2021-07-09 PROCEDURE — 85027 COMPLETE CBC AUTOMATED: CPT | Performed by: STUDENT IN AN ORGANIZED HEALTH CARE EDUCATION/TRAINING PROGRAM

## 2021-07-09 PROCEDURE — 83735 ASSAY OF MAGNESIUM: CPT | Performed by: STUDENT IN AN ORGANIZED HEALTH CARE EDUCATION/TRAINING PROGRAM

## 2021-07-09 PROCEDURE — 85025 COMPLETE CBC W/AUTO DIFF WBC: CPT | Performed by: STUDENT IN AN ORGANIZED HEALTH CARE EDUCATION/TRAINING PROGRAM

## 2021-07-09 PROCEDURE — 84100 ASSAY OF PHOSPHORUS: CPT | Performed by: STUDENT IN AN ORGANIZED HEALTH CARE EDUCATION/TRAINING PROGRAM

## 2021-07-09 PROCEDURE — 85007 BL SMEAR W/DIFF WBC COUNT: CPT | Performed by: STUDENT IN AN ORGANIZED HEALTH CARE EDUCATION/TRAINING PROGRAM

## 2021-07-09 PROCEDURE — A4216 STERILE WATER/SALINE, 10 ML: HCPCS | Performed by: SURGERY

## 2021-07-09 PROCEDURE — 25000003 PHARM REV CODE 250: Performed by: SURGERY

## 2021-07-09 PROCEDURE — 20600001 HC STEP DOWN PRIVATE ROOM

## 2021-07-09 RX ADMIN — AMPICILLIN SODIUM AND SULBACTAM SODIUM 3 G: 2; 1 INJECTION, POWDER, FOR SOLUTION INTRAMUSCULAR; INTRAVENOUS at 05:07

## 2021-07-09 RX ADMIN — Medication 10 ML: at 05:07

## 2021-07-09 RX ADMIN — OXYCODONE HYDROCHLORIDE 10 MG: 10 TABLET ORAL at 08:07

## 2021-07-09 RX ADMIN — PANTOPRAZOLE SODIUM 40 MG: 40 TABLET, DELAYED RELEASE ORAL at 05:07

## 2021-07-09 RX ADMIN — Medication 10 ML: at 12:07

## 2021-07-09 RX ADMIN — ENOXAPARIN SODIUM 40 MG: 40 INJECTION SUBCUTANEOUS at 05:07

## 2021-07-09 RX ADMIN — LIDOCAINE 1 PATCH: 50 PATCH CUTANEOUS at 10:07

## 2021-07-09 RX ADMIN — AMPICILLIN SODIUM AND SULBACTAM SODIUM 3 G: 2; 1 INJECTION, POWDER, FOR SOLUTION INTRAMUSCULAR; INTRAVENOUS at 11:07

## 2021-07-09 RX ADMIN — Medication 10 ML: at 06:07

## 2021-07-10 LAB
ABO + RH BLD: NORMAL
ALBUMIN SERPL BCP-MCNC: 2.6 G/DL (ref 3.5–5.2)
ALP SERPL-CCNC: 67 U/L (ref 55–135)
ALT SERPL W/O P-5'-P-CCNC: 13 U/L (ref 10–44)
ANION GAP SERPL CALC-SCNC: 8 MMOL/L (ref 8–16)
AST SERPL-CCNC: 14 U/L (ref 10–40)
BASOPHILS # BLD AUTO: 0.06 K/UL (ref 0–0.2)
BASOPHILS # BLD AUTO: 0.08 K/UL (ref 0–0.2)
BASOPHILS NFR BLD: 0.8 % (ref 0–1.9)
BASOPHILS NFR BLD: 1 % (ref 0–1.9)
BILIRUB SERPL-MCNC: 0.2 MG/DL (ref 0.1–1)
BLD GP AB SCN CELLS X3 SERPL QL: NORMAL
BUN SERPL-MCNC: 12 MG/DL (ref 8–23)
CALCIUM SERPL-MCNC: 9 MG/DL (ref 8.7–10.5)
CHLORIDE SERPL-SCNC: 105 MMOL/L (ref 95–110)
CO2 SERPL-SCNC: 24 MMOL/L (ref 23–29)
CREAT SERPL-MCNC: 0.8 MG/DL (ref 0.5–1.4)
DIFFERENTIAL METHOD: ABNORMAL
DIFFERENTIAL METHOD: ABNORMAL
EOSINOPHIL # BLD AUTO: 0.1 K/UL (ref 0–0.5)
EOSINOPHIL # BLD AUTO: 0.2 K/UL (ref 0–0.5)
EOSINOPHIL NFR BLD: 1.6 % (ref 0–8)
EOSINOPHIL NFR BLD: 2.2 % (ref 0–8)
ERYTHROCYTE [DISTWIDTH] IN BLOOD BY AUTOMATED COUNT: 13.4 % (ref 11.5–14.5)
ERYTHROCYTE [DISTWIDTH] IN BLOOD BY AUTOMATED COUNT: 13.5 % (ref 11.5–14.5)
EST. GFR  (AFRICAN AMERICAN): >60 ML/MIN/1.73 M^2
EST. GFR  (NON AFRICAN AMERICAN): >60 ML/MIN/1.73 M^2
GLUCOSE SERPL-MCNC: 93 MG/DL (ref 70–110)
HCT VFR BLD AUTO: 27.7 % (ref 40–54)
HCT VFR BLD AUTO: 28.8 % (ref 40–54)
HGB BLD-MCNC: 9 G/DL (ref 14–18)
HGB BLD-MCNC: 9.5 G/DL (ref 14–18)
IMM GRANULOCYTES # BLD AUTO: 0.16 K/UL (ref 0–0.04)
IMM GRANULOCYTES # BLD AUTO: 0.35 K/UL (ref 0–0.04)
IMM GRANULOCYTES NFR BLD AUTO: 2 % (ref 0–0.5)
IMM GRANULOCYTES NFR BLD AUTO: 4.3 % (ref 0–0.5)
LYMPHOCYTES # BLD AUTO: 1.2 K/UL (ref 1–4.8)
LYMPHOCYTES # BLD AUTO: 1.4 K/UL (ref 1–4.8)
LYMPHOCYTES NFR BLD: 15.7 % (ref 18–48)
LYMPHOCYTES NFR BLD: 16.7 % (ref 18–48)
MAGNESIUM SERPL-MCNC: 2 MG/DL (ref 1.6–2.6)
MCH RBC QN AUTO: 29.9 PG (ref 27–31)
MCH RBC QN AUTO: 30.9 PG (ref 27–31)
MCHC RBC AUTO-ENTMCNC: 32.5 G/DL (ref 32–36)
MCHC RBC AUTO-ENTMCNC: 33 G/DL (ref 32–36)
MCV RBC AUTO: 92 FL (ref 82–98)
MCV RBC AUTO: 94 FL (ref 82–98)
MONOCYTES # BLD AUTO: 0.7 K/UL (ref 0.3–1)
MONOCYTES # BLD AUTO: 0.9 K/UL (ref 0.3–1)
MONOCYTES NFR BLD: 10.5 % (ref 4–15)
MONOCYTES NFR BLD: 8.9 % (ref 4–15)
NEUTROPHILS # BLD AUTO: 5.3 K/UL (ref 1.8–7.7)
NEUTROPHILS # BLD AUTO: 5.6 K/UL (ref 1.8–7.7)
NEUTROPHILS NFR BLD: 65.3 % (ref 38–73)
NEUTROPHILS NFR BLD: 71 % (ref 38–73)
NRBC BLD-RTO: 0 /100 WBC
NRBC BLD-RTO: 0 /100 WBC
PHOSPHATE SERPL-MCNC: 3.3 MG/DL (ref 2.7–4.5)
PLATELET # BLD AUTO: 480 K/UL (ref 150–450)
PLATELET # BLD AUTO: 517 K/UL (ref 150–450)
PMV BLD AUTO: 8.1 FL (ref 9.2–12.9)
PMV BLD AUTO: 8.2 FL (ref 9.2–12.9)
POTASSIUM SERPL-SCNC: 4.2 MMOL/L (ref 3.5–5.1)
PROT SERPL-MCNC: 6.4 G/DL (ref 6–8.4)
RBC # BLD AUTO: 3.01 M/UL (ref 4.6–6.2)
RBC # BLD AUTO: 3.07 M/UL (ref 4.6–6.2)
SODIUM SERPL-SCNC: 137 MMOL/L (ref 136–145)
WBC # BLD AUTO: 7.89 K/UL (ref 3.9–12.7)
WBC # BLD AUTO: 8.07 K/UL (ref 3.9–12.7)

## 2021-07-10 PROCEDURE — 25000003 PHARM REV CODE 250: Performed by: STUDENT IN AN ORGANIZED HEALTH CARE EDUCATION/TRAINING PROGRAM

## 2021-07-10 PROCEDURE — 85025 COMPLETE CBC W/AUTO DIFF WBC: CPT | Performed by: STUDENT IN AN ORGANIZED HEALTH CARE EDUCATION/TRAINING PROGRAM

## 2021-07-10 PROCEDURE — 63600175 PHARM REV CODE 636 W HCPCS: Performed by: STUDENT IN AN ORGANIZED HEALTH CARE EDUCATION/TRAINING PROGRAM

## 2021-07-10 PROCEDURE — 85025 COMPLETE CBC W/AUTO DIFF WBC: CPT | Mod: 91 | Performed by: STUDENT IN AN ORGANIZED HEALTH CARE EDUCATION/TRAINING PROGRAM

## 2021-07-10 PROCEDURE — A4216 STERILE WATER/SALINE, 10 ML: HCPCS | Performed by: SURGERY

## 2021-07-10 PROCEDURE — 83735 ASSAY OF MAGNESIUM: CPT | Performed by: STUDENT IN AN ORGANIZED HEALTH CARE EDUCATION/TRAINING PROGRAM

## 2021-07-10 PROCEDURE — 20600001 HC STEP DOWN PRIVATE ROOM

## 2021-07-10 PROCEDURE — 84100 ASSAY OF PHOSPHORUS: CPT | Performed by: STUDENT IN AN ORGANIZED HEALTH CARE EDUCATION/TRAINING PROGRAM

## 2021-07-10 PROCEDURE — 25000003 PHARM REV CODE 250: Performed by: SURGERY

## 2021-07-10 PROCEDURE — 80053 COMPREHEN METABOLIC PANEL: CPT | Performed by: STUDENT IN AN ORGANIZED HEALTH CARE EDUCATION/TRAINING PROGRAM

## 2021-07-10 PROCEDURE — 25500020 PHARM REV CODE 255: Performed by: SURGERY

## 2021-07-10 PROCEDURE — 86900 BLOOD TYPING SEROLOGIC ABO: CPT | Performed by: SURGERY

## 2021-07-10 RX ADMIN — Medication 10 ML: at 01:07

## 2021-07-10 RX ADMIN — AMPICILLIN SODIUM AND SULBACTAM SODIUM 3 G: 2; 1 INJECTION, POWDER, FOR SOLUTION INTRAMUSCULAR; INTRAVENOUS at 06:07

## 2021-07-10 RX ADMIN — AMPICILLIN SODIUM AND SULBACTAM SODIUM 3 G: 2; 1 INJECTION, POWDER, FOR SOLUTION INTRAMUSCULAR; INTRAVENOUS at 01:07

## 2021-07-10 RX ADMIN — IOHEXOL 100 ML: 350 INJECTION, SOLUTION INTRAVENOUS at 06:07

## 2021-07-10 RX ADMIN — PANTOPRAZOLE SODIUM 40 MG: 40 TABLET, DELAYED RELEASE ORAL at 06:07

## 2021-07-10 RX ADMIN — SODIUM CHLORIDE, SODIUM LACTATE, POTASSIUM CHLORIDE, AND CALCIUM CHLORIDE 1000 ML: .6; .31; .03; .02 INJECTION, SOLUTION INTRAVENOUS at 01:07

## 2021-07-10 RX ADMIN — ENOXAPARIN SODIUM 40 MG: 40 INJECTION SUBCUTANEOUS at 05:07

## 2021-07-10 RX ADMIN — LIDOCAINE 1 PATCH: 50 PATCH CUTANEOUS at 01:07

## 2021-07-10 RX ADMIN — Medication 10 ML: at 05:07

## 2021-07-11 LAB
ALBUMIN SERPL BCP-MCNC: 2.6 G/DL (ref 3.5–5.2)
ALP SERPL-CCNC: 75 U/L (ref 55–135)
ALT SERPL W/O P-5'-P-CCNC: 12 U/L (ref 10–44)
AMYLASE, BODY FLUID: <5 U/L
ANION GAP SERPL CALC-SCNC: 8 MMOL/L (ref 8–16)
AST SERPL-CCNC: 14 U/L (ref 10–40)
BASOPHILS # BLD AUTO: 0.08 K/UL (ref 0–0.2)
BASOPHILS NFR BLD: 1.1 % (ref 0–1.9)
BILIRUB SERPL-MCNC: 0.2 MG/DL (ref 0.1–1)
BODY FLUID SOURCE AMYLASE: NORMAL
BUN SERPL-MCNC: 17 MG/DL (ref 8–23)
CALCIUM SERPL-MCNC: 9.2 MG/DL (ref 8.7–10.5)
CHLORIDE SERPL-SCNC: 106 MMOL/L (ref 95–110)
CO2 SERPL-SCNC: 26 MMOL/L (ref 23–29)
CREAT SERPL-MCNC: 0.7 MG/DL (ref 0.5–1.4)
DIFFERENTIAL METHOD: ABNORMAL
EOSINOPHIL # BLD AUTO: 0.2 K/UL (ref 0–0.5)
EOSINOPHIL NFR BLD: 2.1 % (ref 0–8)
ERYTHROCYTE [DISTWIDTH] IN BLOOD BY AUTOMATED COUNT: 13.4 % (ref 11.5–14.5)
EST. GFR  (AFRICAN AMERICAN): >60 ML/MIN/1.73 M^2
EST. GFR  (NON AFRICAN AMERICAN): >60 ML/MIN/1.73 M^2
GLUCOSE SERPL-MCNC: 93 MG/DL (ref 70–110)
HCT VFR BLD AUTO: 29.1 % (ref 40–54)
HGB BLD-MCNC: 9.6 G/DL (ref 14–18)
IMM GRANULOCYTES # BLD AUTO: 0.2 K/UL (ref 0–0.04)
IMM GRANULOCYTES NFR BLD AUTO: 2.8 % (ref 0–0.5)
LYMPHOCYTES # BLD AUTO: 1.3 K/UL (ref 1–4.8)
LYMPHOCYTES NFR BLD: 18.5 % (ref 18–48)
MAGNESIUM SERPL-MCNC: 2.2 MG/DL (ref 1.6–2.6)
MCH RBC QN AUTO: 30.8 PG (ref 27–31)
MCHC RBC AUTO-ENTMCNC: 33 G/DL (ref 32–36)
MCV RBC AUTO: 93 FL (ref 82–98)
MONOCYTES # BLD AUTO: 0.7 K/UL (ref 0.3–1)
MONOCYTES NFR BLD: 10.4 % (ref 4–15)
NEUTROPHILS # BLD AUTO: 4.6 K/UL (ref 1.8–7.7)
NEUTROPHILS NFR BLD: 65.1 % (ref 38–73)
NRBC BLD-RTO: 0 /100 WBC
PHOSPHATE SERPL-MCNC: 3.7 MG/DL (ref 2.7–4.5)
PLATELET # BLD AUTO: 506 K/UL (ref 150–450)
PLATELET BLD QL SMEAR: ABNORMAL
PMV BLD AUTO: 8 FL (ref 9.2–12.9)
POTASSIUM SERPL-SCNC: 4.2 MMOL/L (ref 3.5–5.1)
PROT SERPL-MCNC: 6.4 G/DL (ref 6–8.4)
RBC # BLD AUTO: 3.12 M/UL (ref 4.6–6.2)
SARS-COV-2 RDRP RESP QL NAA+PROBE: NEGATIVE
SODIUM SERPL-SCNC: 140 MMOL/L (ref 136–145)
WBC # BLD AUTO: 7.02 K/UL (ref 3.9–12.7)

## 2021-07-11 PROCEDURE — A4216 STERILE WATER/SALINE, 10 ML: HCPCS | Performed by: SURGERY

## 2021-07-11 PROCEDURE — 25000003 PHARM REV CODE 250: Performed by: STUDENT IN AN ORGANIZED HEALTH CARE EDUCATION/TRAINING PROGRAM

## 2021-07-11 PROCEDURE — 63600175 PHARM REV CODE 636 W HCPCS: Performed by: STUDENT IN AN ORGANIZED HEALTH CARE EDUCATION/TRAINING PROGRAM

## 2021-07-11 PROCEDURE — 83735 ASSAY OF MAGNESIUM: CPT | Performed by: STUDENT IN AN ORGANIZED HEALTH CARE EDUCATION/TRAINING PROGRAM

## 2021-07-11 PROCEDURE — 80053 COMPREHEN METABOLIC PANEL: CPT | Performed by: STUDENT IN AN ORGANIZED HEALTH CARE EDUCATION/TRAINING PROGRAM

## 2021-07-11 PROCEDURE — 82150 ASSAY OF AMYLASE: CPT | Performed by: STUDENT IN AN ORGANIZED HEALTH CARE EDUCATION/TRAINING PROGRAM

## 2021-07-11 PROCEDURE — U0002 COVID-19 LAB TEST NON-CDC: HCPCS | Performed by: STUDENT IN AN ORGANIZED HEALTH CARE EDUCATION/TRAINING PROGRAM

## 2021-07-11 PROCEDURE — 20600001 HC STEP DOWN PRIVATE ROOM

## 2021-07-11 PROCEDURE — 25000003 PHARM REV CODE 250: Performed by: SURGERY

## 2021-07-11 PROCEDURE — 84100 ASSAY OF PHOSPHORUS: CPT | Performed by: STUDENT IN AN ORGANIZED HEALTH CARE EDUCATION/TRAINING PROGRAM

## 2021-07-11 PROCEDURE — 85025 COMPLETE CBC W/AUTO DIFF WBC: CPT | Performed by: STUDENT IN AN ORGANIZED HEALTH CARE EDUCATION/TRAINING PROGRAM

## 2021-07-11 RX ADMIN — AMPICILLIN SODIUM AND SULBACTAM SODIUM 3 G: 2; 1 INJECTION, POWDER, FOR SOLUTION INTRAMUSCULAR; INTRAVENOUS at 05:07

## 2021-07-11 RX ADMIN — Medication 10 ML: at 12:07

## 2021-07-11 RX ADMIN — Medication 10 ML: at 06:07

## 2021-07-11 RX ADMIN — AMPICILLIN SODIUM AND SULBACTAM SODIUM 3 G: 2; 1 INJECTION, POWDER, FOR SOLUTION INTRAMUSCULAR; INTRAVENOUS at 01:07

## 2021-07-11 RX ADMIN — PANTOPRAZOLE SODIUM 40 MG: 40 TABLET, DELAYED RELEASE ORAL at 05:07

## 2021-07-11 RX ADMIN — ENOXAPARIN SODIUM 40 MG: 40 INJECTION SUBCUTANEOUS at 05:07

## 2021-07-11 RX ADMIN — OXYCODONE HYDROCHLORIDE 10 MG: 10 TABLET ORAL at 01:07

## 2021-07-11 RX ADMIN — OXYCODONE HYDROCHLORIDE 10 MG: 10 TABLET ORAL at 03:07

## 2021-07-11 RX ADMIN — POLYETHYLENE GLYCOL 3350 17 G: 17 POWDER, FOR SOLUTION ORAL at 09:07

## 2021-07-11 RX ADMIN — AMPICILLIN SODIUM AND SULBACTAM SODIUM 3 G: 2; 1 INJECTION, POWDER, FOR SOLUTION INTRAMUSCULAR; INTRAVENOUS at 12:07

## 2021-07-11 RX ADMIN — AMPICILLIN SODIUM AND SULBACTAM SODIUM 3 G: 2; 1 INJECTION, POWDER, FOR SOLUTION INTRAMUSCULAR; INTRAVENOUS at 06:07

## 2021-07-11 RX ADMIN — LIDOCAINE 1 PATCH: 50 PATCH CUTANEOUS at 09:07

## 2021-07-12 ENCOUNTER — ANESTHESIA EVENT (OUTPATIENT)
Dept: ENDOSCOPY | Facility: HOSPITAL | Age: 61
DRG: 445 | End: 2021-07-12
Payer: OTHER GOVERNMENT

## 2021-07-12 ENCOUNTER — ANESTHESIA (OUTPATIENT)
Dept: ENDOSCOPY | Facility: HOSPITAL | Age: 61
DRG: 445 | End: 2021-07-12
Payer: OTHER GOVERNMENT

## 2021-07-12 LAB
ALBUMIN SERPL BCP-MCNC: 2.6 G/DL (ref 3.5–5.2)
ALP SERPL-CCNC: 73 U/L (ref 55–135)
ALT SERPL W/O P-5'-P-CCNC: 12 U/L (ref 10–44)
ANION GAP SERPL CALC-SCNC: 9 MMOL/L (ref 8–16)
ANISOCYTOSIS BLD QL SMEAR: SLIGHT
AST SERPL-CCNC: 15 U/L (ref 10–40)
BASO STIPL BLD QL SMEAR: ABNORMAL
BASOPHILS # BLD AUTO: 0.05 K/UL (ref 0–0.2)
BASOPHILS NFR BLD: 0.8 % (ref 0–1.9)
BILIRUB SERPL-MCNC: 0.2 MG/DL (ref 0.1–1)
BUN SERPL-MCNC: 14 MG/DL (ref 8–23)
CALCIUM SERPL-MCNC: 9.1 MG/DL (ref 8.7–10.5)
CHLORIDE SERPL-SCNC: 106 MMOL/L (ref 95–110)
CO2 SERPL-SCNC: 25 MMOL/L (ref 23–29)
CREAT SERPL-MCNC: 0.8 MG/DL (ref 0.5–1.4)
DIFFERENTIAL METHOD: ABNORMAL
EOSINOPHIL # BLD AUTO: 0.2 K/UL (ref 0–0.5)
EOSINOPHIL NFR BLD: 2.6 % (ref 0–8)
ERYTHROCYTE [DISTWIDTH] IN BLOOD BY AUTOMATED COUNT: 13.4 % (ref 11.5–14.5)
EST. GFR  (AFRICAN AMERICAN): >60 ML/MIN/1.73 M^2
EST. GFR  (NON AFRICAN AMERICAN): >60 ML/MIN/1.73 M^2
GLUCOSE SERPL-MCNC: 95 MG/DL (ref 70–110)
HCT VFR BLD AUTO: 28.4 % (ref 40–54)
HGB BLD-MCNC: 9.2 G/DL (ref 14–18)
IMM GRANULOCYTES # BLD AUTO: 0.1 K/UL (ref 0–0.04)
IMM GRANULOCYTES NFR BLD AUTO: 1.5 % (ref 0–0.5)
LYMPHOCYTES # BLD AUTO: 1.2 K/UL (ref 1–4.8)
LYMPHOCYTES NFR BLD: 18.7 % (ref 18–48)
MAGNESIUM SERPL-MCNC: 2.1 MG/DL (ref 1.6–2.6)
MCH RBC QN AUTO: 30.5 PG (ref 27–31)
MCHC RBC AUTO-ENTMCNC: 32.4 G/DL (ref 32–36)
MCV RBC AUTO: 94 FL (ref 82–98)
MONOCYTES # BLD AUTO: 0.6 K/UL (ref 0.3–1)
MONOCYTES NFR BLD: 8.7 % (ref 4–15)
NEUTROPHILS # BLD AUTO: 4.5 K/UL (ref 1.8–7.7)
NEUTROPHILS NFR BLD: 67.7 % (ref 38–73)
NRBC BLD-RTO: 0 /100 WBC
PHOSPHATE SERPL-MCNC: 3.4 MG/DL (ref 2.7–4.5)
PLATELET # BLD AUTO: 529 K/UL (ref 150–450)
PLATELET BLD QL SMEAR: ABNORMAL
PMV BLD AUTO: 8.2 FL (ref 9.2–12.9)
POLYCHROMASIA BLD QL SMEAR: ABNORMAL
POTASSIUM SERPL-SCNC: 4.3 MMOL/L (ref 3.5–5.1)
PROT SERPL-MCNC: 6.2 G/DL (ref 6–8.4)
RBC # BLD AUTO: 3.02 M/UL (ref 4.6–6.2)
SODIUM SERPL-SCNC: 140 MMOL/L (ref 136–145)
WBC # BLD AUTO: 6.64 K/UL (ref 3.9–12.7)

## 2021-07-12 PROCEDURE — 25000003 PHARM REV CODE 250: Performed by: STUDENT IN AN ORGANIZED HEALTH CARE EDUCATION/TRAINING PROGRAM

## 2021-07-12 PROCEDURE — 63600175 PHARM REV CODE 636 W HCPCS: Performed by: STUDENT IN AN ORGANIZED HEALTH CARE EDUCATION/TRAINING PROGRAM

## 2021-07-12 PROCEDURE — D9220A PRA ANESTHESIA: ICD-10-PCS | Mod: ANES,,, | Performed by: ANESTHESIOLOGY

## 2021-07-12 PROCEDURE — 63600175 PHARM REV CODE 636 W HCPCS: Performed by: REGISTERED NURSE

## 2021-07-12 PROCEDURE — D9220A PRA ANESTHESIA: Mod: CRNA,,, | Performed by: REGISTERED NURSE

## 2021-07-12 PROCEDURE — 83735 ASSAY OF MAGNESIUM: CPT | Performed by: STUDENT IN AN ORGANIZED HEALTH CARE EDUCATION/TRAINING PROGRAM

## 2021-07-12 PROCEDURE — 37000008 HC ANESTHESIA 1ST 15 MINUTES: Performed by: INTERNAL MEDICINE

## 2021-07-12 PROCEDURE — 37000009 HC ANESTHESIA EA ADD 15 MINS: Performed by: INTERNAL MEDICINE

## 2021-07-12 PROCEDURE — 25000003 PHARM REV CODE 250: Performed by: REGISTERED NURSE

## 2021-07-12 PROCEDURE — A4216 STERILE WATER/SALINE, 10 ML: HCPCS | Performed by: SURGERY

## 2021-07-12 PROCEDURE — 43235 EGD DIAGNOSTIC BRUSH WASH: CPT | Performed by: INTERNAL MEDICINE

## 2021-07-12 PROCEDURE — 25000003 PHARM REV CODE 250: Performed by: SURGERY

## 2021-07-12 PROCEDURE — 43235 PR EGD, FLEX, DIAGNOSTIC: ICD-10-PCS | Mod: ,,, | Performed by: INTERNAL MEDICINE

## 2021-07-12 PROCEDURE — 80053 COMPREHEN METABOLIC PANEL: CPT | Performed by: STUDENT IN AN ORGANIZED HEALTH CARE EDUCATION/TRAINING PROGRAM

## 2021-07-12 PROCEDURE — 20600001 HC STEP DOWN PRIVATE ROOM

## 2021-07-12 PROCEDURE — D9220A PRA ANESTHESIA: ICD-10-PCS | Mod: CRNA,,, | Performed by: REGISTERED NURSE

## 2021-07-12 PROCEDURE — 43235 EGD DIAGNOSTIC BRUSH WASH: CPT | Mod: ,,, | Performed by: INTERNAL MEDICINE

## 2021-07-12 PROCEDURE — 84100 ASSAY OF PHOSPHORUS: CPT | Performed by: STUDENT IN AN ORGANIZED HEALTH CARE EDUCATION/TRAINING PROGRAM

## 2021-07-12 PROCEDURE — D9220A PRA ANESTHESIA: Mod: ANES,,, | Performed by: ANESTHESIOLOGY

## 2021-07-12 PROCEDURE — 85025 COMPLETE CBC W/AUTO DIFF WBC: CPT | Performed by: STUDENT IN AN ORGANIZED HEALTH CARE EDUCATION/TRAINING PROGRAM

## 2021-07-12 RX ORDER — PROPOFOL 10 MG/ML
VIAL (ML) INTRAVENOUS
Status: DISCONTINUED | OUTPATIENT
Start: 2021-07-12 | End: 2021-07-12

## 2021-07-12 RX ORDER — ATORVASTATIN CALCIUM 20 MG/1
40 TABLET, FILM COATED ORAL DAILY
Status: DISCONTINUED | OUTPATIENT
Start: 2021-07-12 | End: 2021-07-14 | Stop reason: HOSPADM

## 2021-07-12 RX ORDER — PROPOFOL 10 MG/ML
VIAL (ML) INTRAVENOUS CONTINUOUS PRN
Status: DISCONTINUED | OUTPATIENT
Start: 2021-07-12 | End: 2021-07-12

## 2021-07-12 RX ORDER — FENTANYL CITRATE 50 UG/ML
INJECTION, SOLUTION INTRAMUSCULAR; INTRAVENOUS
Status: DISCONTINUED | OUTPATIENT
Start: 2021-07-12 | End: 2021-07-12

## 2021-07-12 RX ORDER — LIDOCAINE HYDROCHLORIDE 20 MG/ML
INJECTION INTRAVENOUS
Status: DISCONTINUED | OUTPATIENT
Start: 2021-07-12 | End: 2021-07-12

## 2021-07-12 RX ORDER — SODIUM CHLORIDE 0.9 % (FLUSH) 0.9 %
3 SYRINGE (ML) INJECTION
Status: DISCONTINUED | OUTPATIENT
Start: 2021-07-12 | End: 2021-07-12 | Stop reason: HOSPADM

## 2021-07-12 RX ORDER — SODIUM CHLORIDE 9 MG/ML
INJECTION, SOLUTION INTRAVENOUS CONTINUOUS PRN
Status: DISCONTINUED | OUTPATIENT
Start: 2021-07-12 | End: 2021-07-12

## 2021-07-12 RX ADMIN — AMPICILLIN SODIUM AND SULBACTAM SODIUM 3 G: 2; 1 INJECTION, POWDER, FOR SOLUTION INTRAMUSCULAR; INTRAVENOUS at 04:07

## 2021-07-12 RX ADMIN — SODIUM CHLORIDE: 9 INJECTION, SOLUTION INTRAVENOUS at 11:07

## 2021-07-12 RX ADMIN — FENTANYL CITRATE 25 MCG: 50 INJECTION, SOLUTION INTRAMUSCULAR; INTRAVENOUS at 11:07

## 2021-07-12 RX ADMIN — AMPICILLIN SODIUM AND SULBACTAM SODIUM 3 G: 2; 1 INJECTION, POWDER, FOR SOLUTION INTRAMUSCULAR; INTRAVENOUS at 08:07

## 2021-07-12 RX ADMIN — LIDOCAINE 1 PATCH: 50 PATCH CUTANEOUS at 09:07

## 2021-07-12 RX ADMIN — LIDOCAINE HYDROCHLORIDE 140 MG: 20 INJECTION, SOLUTION INTRAVENOUS at 11:07

## 2021-07-12 RX ADMIN — Medication 10 ML: at 12:07

## 2021-07-12 RX ADMIN — PANTOPRAZOLE SODIUM 40 MG: 40 TABLET, DELAYED RELEASE ORAL at 04:07

## 2021-07-12 RX ADMIN — Medication 10 ML: at 06:07

## 2021-07-12 RX ADMIN — PROPOFOL 60 MG: 10 INJECTION, EMULSION INTRAVENOUS at 11:07

## 2021-07-12 RX ADMIN — POLYETHYLENE GLYCOL 3350 17 G: 17 POWDER, FOR SOLUTION ORAL at 08:07

## 2021-07-12 RX ADMIN — OXYCODONE HYDROCHLORIDE 10 MG: 10 TABLET ORAL at 09:07

## 2021-07-12 RX ADMIN — PROPOFOL 250 MCG/KG/MIN: 10 INJECTION, EMULSION INTRAVENOUS at 11:07

## 2021-07-12 RX ADMIN — ATORVASTATIN CALCIUM 40 MG: 20 TABLET, FILM COATED ORAL at 01:07

## 2021-07-12 RX ADMIN — ENOXAPARIN SODIUM 40 MG: 40 INJECTION SUBCUTANEOUS at 04:07

## 2021-07-12 RX ADMIN — GLYCOPYRROLATE 0.2 MG: 0.2 INJECTION, SOLUTION INTRAMUSCULAR; INTRAVITREAL at 11:07

## 2021-07-12 RX ADMIN — AMPICILLIN SODIUM AND SULBACTAM SODIUM 3 G: 2; 1 INJECTION, POWDER, FOR SOLUTION INTRAMUSCULAR; INTRAVENOUS at 03:07

## 2021-07-13 LAB
ALBUMIN SERPL BCP-MCNC: 2.6 G/DL (ref 3.5–5.2)
ALP SERPL-CCNC: 75 U/L (ref 55–135)
ALT SERPL W/O P-5'-P-CCNC: 10 U/L (ref 10–44)
ANION GAP SERPL CALC-SCNC: 7 MMOL/L (ref 8–16)
AST SERPL-CCNC: 14 U/L (ref 10–40)
BASOPHILS # BLD AUTO: 0.06 K/UL (ref 0–0.2)
BASOPHILS NFR BLD: 1 % (ref 0–1.9)
BILIRUB SERPL-MCNC: 0.2 MG/DL (ref 0.1–1)
BUN SERPL-MCNC: 17 MG/DL (ref 8–23)
CALCIUM SERPL-MCNC: 8.8 MG/DL (ref 8.7–10.5)
CHLORIDE SERPL-SCNC: 105 MMOL/L (ref 95–110)
CO2 SERPL-SCNC: 26 MMOL/L (ref 23–29)
CREAT SERPL-MCNC: 0.8 MG/DL (ref 0.5–1.4)
DIFFERENTIAL METHOD: ABNORMAL
EOSINOPHIL # BLD AUTO: 0.1 K/UL (ref 0–0.5)
EOSINOPHIL NFR BLD: 2.1 % (ref 0–8)
ERYTHROCYTE [DISTWIDTH] IN BLOOD BY AUTOMATED COUNT: 13.3 % (ref 11.5–14.5)
EST. GFR  (AFRICAN AMERICAN): >60 ML/MIN/1.73 M^2
EST. GFR  (NON AFRICAN AMERICAN): >60 ML/MIN/1.73 M^2
GLUCOSE SERPL-MCNC: 102 MG/DL (ref 70–110)
HCT VFR BLD AUTO: 28.2 % (ref 40–54)
HGB BLD-MCNC: 9.2 G/DL (ref 14–18)
IMM GRANULOCYTES # BLD AUTO: 0.08 K/UL (ref 0–0.04)
IMM GRANULOCYTES NFR BLD AUTO: 1.3 % (ref 0–0.5)
LYMPHOCYTES # BLD AUTO: 1.1 K/UL (ref 1–4.8)
LYMPHOCYTES NFR BLD: 17 % (ref 18–48)
MAGNESIUM SERPL-MCNC: 2.2 MG/DL (ref 1.6–2.6)
MCH RBC QN AUTO: 30.8 PG (ref 27–31)
MCHC RBC AUTO-ENTMCNC: 32.6 G/DL (ref 32–36)
MCV RBC AUTO: 94 FL (ref 82–98)
MONOCYTES # BLD AUTO: 0.7 K/UL (ref 0.3–1)
MONOCYTES NFR BLD: 10.9 % (ref 4–15)
NEUTROPHILS # BLD AUTO: 4.3 K/UL (ref 1.8–7.7)
NEUTROPHILS NFR BLD: 67.7 % (ref 38–73)
NRBC BLD-RTO: 0 /100 WBC
PHOSPHATE SERPL-MCNC: 3.2 MG/DL (ref 2.7–4.5)
PLATELET # BLD AUTO: 546 K/UL (ref 150–450)
PMV BLD AUTO: 8.5 FL (ref 9.2–12.9)
POTASSIUM SERPL-SCNC: 4.3 MMOL/L (ref 3.5–5.1)
PROT SERPL-MCNC: 6.3 G/DL (ref 6–8.4)
RBC # BLD AUTO: 2.99 M/UL (ref 4.6–6.2)
SODIUM SERPL-SCNC: 138 MMOL/L (ref 136–145)
WBC # BLD AUTO: 6.31 K/UL (ref 3.9–12.7)

## 2021-07-13 PROCEDURE — A4216 STERILE WATER/SALINE, 10 ML: HCPCS | Performed by: SURGERY

## 2021-07-13 PROCEDURE — 63600175 PHARM REV CODE 636 W HCPCS: Performed by: STUDENT IN AN ORGANIZED HEALTH CARE EDUCATION/TRAINING PROGRAM

## 2021-07-13 PROCEDURE — 83735 ASSAY OF MAGNESIUM: CPT | Performed by: STUDENT IN AN ORGANIZED HEALTH CARE EDUCATION/TRAINING PROGRAM

## 2021-07-13 PROCEDURE — 80053 COMPREHEN METABOLIC PANEL: CPT | Performed by: STUDENT IN AN ORGANIZED HEALTH CARE EDUCATION/TRAINING PROGRAM

## 2021-07-13 PROCEDURE — 85025 COMPLETE CBC W/AUTO DIFF WBC: CPT | Performed by: STUDENT IN AN ORGANIZED HEALTH CARE EDUCATION/TRAINING PROGRAM

## 2021-07-13 PROCEDURE — 20600001 HC STEP DOWN PRIVATE ROOM

## 2021-07-13 PROCEDURE — 25000003 PHARM REV CODE 250: Performed by: STUDENT IN AN ORGANIZED HEALTH CARE EDUCATION/TRAINING PROGRAM

## 2021-07-13 PROCEDURE — 84100 ASSAY OF PHOSPHORUS: CPT | Performed by: STUDENT IN AN ORGANIZED HEALTH CARE EDUCATION/TRAINING PROGRAM

## 2021-07-13 PROCEDURE — 25000003 PHARM REV CODE 250

## 2021-07-13 PROCEDURE — 25000003 PHARM REV CODE 250: Performed by: SURGERY

## 2021-07-13 RX ORDER — GLYCERIN 1 G/1
1 SUPPOSITORY RECTAL ONCE
Status: COMPLETED | OUTPATIENT
Start: 2021-07-13 | End: 2021-07-13

## 2021-07-13 RX ORDER — LIDOCAINE 50 MG/G
1 PATCH TOPICAL DAILY
Qty: 15 PATCH | Refills: 0 | Status: SHIPPED | OUTPATIENT
Start: 2021-07-13 | End: 2021-07-29 | Stop reason: SDUPTHER

## 2021-07-13 RX ADMIN — Medication 10 ML: at 06:07

## 2021-07-13 RX ADMIN — PANTOPRAZOLE SODIUM 40 MG: 40 TABLET, DELAYED RELEASE ORAL at 06:07

## 2021-07-13 RX ADMIN — AMPICILLIN SODIUM AND SULBACTAM SODIUM 3 G: 2; 1 INJECTION, POWDER, FOR SOLUTION INTRAMUSCULAR; INTRAVENOUS at 10:07

## 2021-07-13 RX ADMIN — Medication 10 ML: at 12:07

## 2021-07-13 RX ADMIN — ATORVASTATIN CALCIUM 40 MG: 20 TABLET, FILM COATED ORAL at 08:07

## 2021-07-13 RX ADMIN — AMPICILLIN SODIUM AND SULBACTAM SODIUM 3 G: 2; 1 INJECTION, POWDER, FOR SOLUTION INTRAMUSCULAR; INTRAVENOUS at 03:07

## 2021-07-13 RX ADMIN — ENOXAPARIN SODIUM 40 MG: 40 INJECTION SUBCUTANEOUS at 05:07

## 2021-07-13 RX ADMIN — AMPICILLIN SODIUM AND SULBACTAM SODIUM 3 G: 2; 1 INJECTION, POWDER, FOR SOLUTION INTRAMUSCULAR; INTRAVENOUS at 08:07

## 2021-07-13 RX ADMIN — POLYETHYLENE GLYCOL 3350 17 G: 17 POWDER, FOR SOLUTION ORAL at 08:07

## 2021-07-13 RX ADMIN — LIDOCAINE 1 PATCH: 50 PATCH CUTANEOUS at 10:07

## 2021-07-13 RX ADMIN — GLYCERIN 1 SUPPOSITORY: 2 SUPPOSITORY RECTAL at 08:07

## 2021-07-13 RX ADMIN — AMPICILLIN SODIUM AND SULBACTAM SODIUM 3 G: 2; 1 INJECTION, POWDER, FOR SOLUTION INTRAMUSCULAR; INTRAVENOUS at 02:07

## 2021-07-13 RX ADMIN — PANTOPRAZOLE SODIUM 40 MG: 40 TABLET, DELAYED RELEASE ORAL at 04:07

## 2021-07-13 RX ADMIN — OXYCODONE 5 MG: 5 TABLET ORAL at 01:07

## 2021-07-14 VITALS
RESPIRATION RATE: 18 BRPM | OXYGEN SATURATION: 96 % | DIASTOLIC BLOOD PRESSURE: 57 MMHG | WEIGHT: 124.31 LBS | TEMPERATURE: 98 F | BODY MASS INDEX: 20.71 KG/M2 | HEIGHT: 65 IN | SYSTOLIC BLOOD PRESSURE: 98 MMHG | HEART RATE: 81 BPM

## 2021-07-14 LAB
ALBUMIN SERPL BCP-MCNC: 2.7 G/DL (ref 3.5–5.2)
ALP SERPL-CCNC: 75 U/L (ref 55–135)
ALT SERPL W/O P-5'-P-CCNC: 14 U/L (ref 10–44)
ANION GAP SERPL CALC-SCNC: 9 MMOL/L (ref 8–16)
ANISOCYTOSIS BLD QL SMEAR: SLIGHT
AST SERPL-CCNC: 16 U/L (ref 10–40)
BASO STIPL BLD QL SMEAR: ABNORMAL
BASOPHILS # BLD AUTO: 0.04 K/UL (ref 0–0.2)
BASOPHILS # BLD AUTO: 0.05 K/UL (ref 0–0.2)
BASOPHILS NFR BLD: 0.7 % (ref 0–1.9)
BASOPHILS NFR BLD: 0.8 % (ref 0–1.9)
BILIRUB SERPL-MCNC: 0.2 MG/DL (ref 0.1–1)
BUN SERPL-MCNC: 18 MG/DL (ref 8–23)
CALCIUM SERPL-MCNC: 9.2 MG/DL (ref 8.7–10.5)
CHLORIDE SERPL-SCNC: 108 MMOL/L (ref 95–110)
CO2 SERPL-SCNC: 25 MMOL/L (ref 23–29)
CREAT SERPL-MCNC: 0.8 MG/DL (ref 0.5–1.4)
DIFFERENTIAL METHOD: ABNORMAL
DIFFERENTIAL METHOD: ABNORMAL
EOSINOPHIL # BLD AUTO: 0.1 K/UL (ref 0–0.5)
EOSINOPHIL # BLD AUTO: 0.2 K/UL (ref 0–0.5)
EOSINOPHIL NFR BLD: 2.2 % (ref 0–8)
EOSINOPHIL NFR BLD: 2.7 % (ref 0–8)
ERYTHROCYTE [DISTWIDTH] IN BLOOD BY AUTOMATED COUNT: 13.2 % (ref 11.5–14.5)
ERYTHROCYTE [DISTWIDTH] IN BLOOD BY AUTOMATED COUNT: 13.4 % (ref 11.5–14.5)
EST. GFR  (AFRICAN AMERICAN): >60 ML/MIN/1.73 M^2
EST. GFR  (NON AFRICAN AMERICAN): >60 ML/MIN/1.73 M^2
GLUCOSE SERPL-MCNC: 99 MG/DL (ref 70–110)
HCT VFR BLD AUTO: 29.3 % (ref 40–54)
HCT VFR BLD AUTO: 29.6 % (ref 40–54)
HGB BLD-MCNC: 9.4 G/DL (ref 14–18)
HGB BLD-MCNC: 9.6 G/DL (ref 14–18)
HYPOCHROMIA BLD QL SMEAR: ABNORMAL
IMM GRANULOCYTES # BLD AUTO: 0.06 K/UL (ref 0–0.04)
IMM GRANULOCYTES # BLD AUTO: 0.07 K/UL (ref 0–0.04)
IMM GRANULOCYTES NFR BLD AUTO: 1 % (ref 0–0.5)
IMM GRANULOCYTES NFR BLD AUTO: 1.2 % (ref 0–0.5)
LYMPHOCYTES # BLD AUTO: 1.2 K/UL (ref 1–4.8)
LYMPHOCYTES # BLD AUTO: 1.4 K/UL (ref 1–4.8)
LYMPHOCYTES NFR BLD: 20.3 % (ref 18–48)
LYMPHOCYTES NFR BLD: 23.4 % (ref 18–48)
MAGNESIUM SERPL-MCNC: 2.2 MG/DL (ref 1.6–2.6)
MCH RBC QN AUTO: 29.8 PG (ref 27–31)
MCH RBC QN AUTO: 30 PG (ref 27–31)
MCHC RBC AUTO-ENTMCNC: 32.1 G/DL (ref 32–36)
MCHC RBC AUTO-ENTMCNC: 32.4 G/DL (ref 32–36)
MCV RBC AUTO: 92 FL (ref 82–98)
MCV RBC AUTO: 94 FL (ref 82–98)
MONOCYTES # BLD AUTO: 0.6 K/UL (ref 0.3–1)
MONOCYTES # BLD AUTO: 0.7 K/UL (ref 0.3–1)
MONOCYTES NFR BLD: 10.7 % (ref 4–15)
MONOCYTES NFR BLD: 11.5 % (ref 4–15)
NEUTROPHILS # BLD AUTO: 3.5 K/UL (ref 1.8–7.7)
NEUTROPHILS # BLD AUTO: 3.8 K/UL (ref 1.8–7.7)
NEUTROPHILS NFR BLD: 60.5 % (ref 38–73)
NEUTROPHILS NFR BLD: 65 % (ref 38–73)
NRBC BLD-RTO: 0 /100 WBC
NRBC BLD-RTO: 0 /100 WBC
PHOSPHATE SERPL-MCNC: 3.4 MG/DL (ref 2.7–4.5)
PLATELET # BLD AUTO: 528 K/UL (ref 150–450)
PLATELET # BLD AUTO: 564 K/UL (ref 150–450)
PLATELET BLD QL SMEAR: ABNORMAL
PMV BLD AUTO: 8.2 FL (ref 9.2–12.9)
PMV BLD AUTO: 8.6 FL (ref 9.2–12.9)
POLYCHROMASIA BLD QL SMEAR: ABNORMAL
POTASSIUM SERPL-SCNC: 4.3 MMOL/L (ref 3.5–5.1)
PROT SERPL-MCNC: 6.5 G/DL (ref 6–8.4)
RBC # BLD AUTO: 3.13 M/UL (ref 4.6–6.2)
RBC # BLD AUTO: 3.22 M/UL (ref 4.6–6.2)
SODIUM SERPL-SCNC: 142 MMOL/L (ref 136–145)
WBC # BLD AUTO: 5.82 K/UL (ref 3.9–12.7)
WBC # BLD AUTO: 5.9 K/UL (ref 3.9–12.7)

## 2021-07-14 PROCEDURE — 83735 ASSAY OF MAGNESIUM: CPT | Performed by: STUDENT IN AN ORGANIZED HEALTH CARE EDUCATION/TRAINING PROGRAM

## 2021-07-14 PROCEDURE — 85025 COMPLETE CBC W/AUTO DIFF WBC: CPT | Mod: 91 | Performed by: STUDENT IN AN ORGANIZED HEALTH CARE EDUCATION/TRAINING PROGRAM

## 2021-07-14 PROCEDURE — 85025 COMPLETE CBC W/AUTO DIFF WBC: CPT

## 2021-07-14 PROCEDURE — 25000003 PHARM REV CODE 250: Performed by: STUDENT IN AN ORGANIZED HEALTH CARE EDUCATION/TRAINING PROGRAM

## 2021-07-14 PROCEDURE — 80053 COMPREHEN METABOLIC PANEL: CPT | Performed by: STUDENT IN AN ORGANIZED HEALTH CARE EDUCATION/TRAINING PROGRAM

## 2021-07-14 PROCEDURE — 63600175 PHARM REV CODE 636 W HCPCS: Performed by: STUDENT IN AN ORGANIZED HEALTH CARE EDUCATION/TRAINING PROGRAM

## 2021-07-14 PROCEDURE — 84100 ASSAY OF PHOSPHORUS: CPT | Performed by: STUDENT IN AN ORGANIZED HEALTH CARE EDUCATION/TRAINING PROGRAM

## 2021-07-14 PROCEDURE — A4216 STERILE WATER/SALINE, 10 ML: HCPCS | Performed by: SURGERY

## 2021-07-14 PROCEDURE — 25000003 PHARM REV CODE 250: Performed by: SURGERY

## 2021-07-14 RX ADMIN — ATORVASTATIN CALCIUM 40 MG: 20 TABLET, FILM COATED ORAL at 08:07

## 2021-07-14 RX ADMIN — LIDOCAINE 1 PATCH: 50 PATCH CUTANEOUS at 10:07

## 2021-07-14 RX ADMIN — OXYCODONE 5 MG: 5 TABLET ORAL at 10:07

## 2021-07-14 RX ADMIN — PANTOPRAZOLE SODIUM 40 MG: 40 TABLET, DELAYED RELEASE ORAL at 05:07

## 2021-07-14 RX ADMIN — AMPICILLIN SODIUM AND SULBACTAM SODIUM 3 G: 2; 1 INJECTION, POWDER, FOR SOLUTION INTRAMUSCULAR; INTRAVENOUS at 04:07

## 2021-07-14 RX ADMIN — Medication 10 ML: at 05:07

## 2021-07-14 RX ADMIN — AMPICILLIN SODIUM AND SULBACTAM SODIUM 3 G: 2; 1 INJECTION, POWDER, FOR SOLUTION INTRAMUSCULAR; INTRAVENOUS at 08:07

## 2021-07-14 RX ADMIN — POLYETHYLENE GLYCOL 3350 17 G: 17 POWDER, FOR SOLUTION ORAL at 08:07

## 2021-07-15 LAB — BACTERIA SPEC ANAEROBE CULT: NORMAL

## 2021-07-27 ENCOUNTER — TELEPHONE (OUTPATIENT)
Dept: SURGERY | Facility: CLINIC | Age: 61
End: 2021-07-27

## 2021-07-29 ENCOUNTER — OFFICE VISIT (OUTPATIENT)
Dept: SURGERY | Facility: CLINIC | Age: 61
End: 2021-07-29
Payer: OTHER GOVERNMENT

## 2021-07-29 VITALS
WEIGHT: 127.5 LBS | HEIGHT: 65 IN | RESPIRATION RATE: 18 BRPM | OXYGEN SATURATION: 97 % | SYSTOLIC BLOOD PRESSURE: 109 MMHG | HEART RATE: 76 BPM | TEMPERATURE: 98 F | BODY MASS INDEX: 21.24 KG/M2 | DIASTOLIC BLOOD PRESSURE: 66 MMHG

## 2021-07-29 DIAGNOSIS — K75.0 ABSCESS OF LIVER: ICD-10-CM

## 2021-07-29 PROCEDURE — 99999 PR PBB SHADOW E&M-EST. PATIENT-LVL IV: CPT | Mod: PBBFAC,,, | Performed by: SURGERY

## 2021-07-29 PROCEDURE — 99214 OFFICE O/P EST MOD 30 MIN: CPT | Mod: PBBFAC | Performed by: SURGERY

## 2021-07-29 PROCEDURE — 99213 OFFICE O/P EST LOW 20 MIN: CPT | Mod: S$PBB,,, | Performed by: SURGERY

## 2021-07-29 PROCEDURE — 99999 PR PBB SHADOW E&M-EST. PATIENT-LVL IV: ICD-10-PCS | Mod: PBBFAC,,, | Performed by: SURGERY

## 2021-07-29 PROCEDURE — 99213 PR OFFICE/OUTPT VISIT, EST, LEVL III, 20-29 MIN: ICD-10-PCS | Mod: S$PBB,,, | Performed by: SURGERY

## 2021-07-29 RX ORDER — LIDOCAINE 50 MG/G
1 PATCH TOPICAL DAILY
Qty: 30 PATCH | Refills: 0 | Status: SHIPPED | OUTPATIENT
Start: 2021-07-29 | End: 2022-01-06

## 2021-07-29 RX ORDER — INFLUENZA VIRUS VACCINE 15; 15; 15; 15 UG/.5ML; UG/.5ML; UG/.5ML; UG/.5ML
SUSPENSION INTRAMUSCULAR
COMMUNITY
Start: 2020-11-15

## 2021-07-29 RX ORDER — AMOXICILLIN 500 MG/1
CAPSULE ORAL
COMMUNITY
Start: 2021-07-26

## 2021-08-02 LAB — FUNGUS SPEC CULT: NORMAL

## 2021-08-06 ENCOUNTER — NURSE TRIAGE (OUTPATIENT)
Dept: ADMINISTRATIVE | Facility: CLINIC | Age: 61
End: 2021-08-06

## 2021-08-10 ENCOUNTER — TELEPHONE (OUTPATIENT)
Dept: SURGERY | Facility: CLINIC | Age: 61
End: 2021-08-10

## 2021-08-10 LAB — FUNGUS SPEC CULT: NORMAL

## 2021-08-12 ENCOUNTER — OFFICE VISIT (OUTPATIENT)
Dept: SURGERY | Facility: CLINIC | Age: 61
End: 2021-08-12
Payer: OTHER GOVERNMENT

## 2021-08-12 ENCOUNTER — HOSPITAL ENCOUNTER (OUTPATIENT)
Dept: RADIOLOGY | Facility: HOSPITAL | Age: 61
Discharge: HOME | End: 2021-08-12
Attending: SURGERY
Payer: OTHER GOVERNMENT

## 2021-08-12 VITALS
OXYGEN SATURATION: 97 % | BODY MASS INDEX: 21.58 KG/M2 | RESPIRATION RATE: 18 BRPM | SYSTOLIC BLOOD PRESSURE: 99 MMHG | TEMPERATURE: 99 F | WEIGHT: 129.5 LBS | HEIGHT: 65 IN | DIASTOLIC BLOOD PRESSURE: 61 MMHG | HEART RATE: 85 BPM

## 2021-08-12 DIAGNOSIS — K75.0 ABSCESS OF LIVER: ICD-10-CM

## 2021-08-12 DIAGNOSIS — K75.0 ABSCESS OF LIVER: Primary | ICD-10-CM

## 2021-08-12 PROCEDURE — 99999 PR PBB SHADOW E&M-EST. PATIENT-LVL IV: CPT | Mod: PBBFAC,,, | Performed by: SURGERY

## 2021-08-12 PROCEDURE — 74177 CT ABD & PELVIS W/CONTRAST: CPT | Mod: TC

## 2021-08-12 PROCEDURE — 99499 NO LOS: ICD-10-PCS | Mod: S$PBB,,, | Performed by: SURGERY

## 2021-08-12 PROCEDURE — 99214 OFFICE O/P EST MOD 30 MIN: CPT | Mod: PBBFAC | Performed by: SURGERY

## 2021-08-12 PROCEDURE — 99999 PR PBB SHADOW E&M-EST. PATIENT-LVL IV: ICD-10-PCS | Mod: PBBFAC,,, | Performed by: SURGERY

## 2021-08-12 PROCEDURE — 99499 UNLISTED E&M SERVICE: CPT | Mod: S$PBB,,, | Performed by: SURGERY

## 2021-08-12 PROCEDURE — 25500020 PHARM REV CODE 255: Performed by: SURGERY

## 2021-08-12 RX ADMIN — IOHEXOL 100 ML: 350 INJECTION, SOLUTION INTRAVENOUS at 10:08

## 2021-08-16 ENCOUNTER — TELEPHONE (OUTPATIENT)
Dept: SURGERY | Facility: CLINIC | Age: 61
End: 2021-08-16

## 2021-08-16 RX ORDER — OXYCODONE HYDROCHLORIDE 5 MG/1
5 TABLET ORAL EVERY 4 HOURS PRN
Qty: 28 TABLET | Refills: 0 | Status: SHIPPED | OUTPATIENT
Start: 2021-08-16 | End: 2021-08-23

## 2021-08-18 LAB
ACID FAST MOD KINY STN SPEC: NORMAL
MYCOBACTERIUM SPEC QL CULT: NORMAL

## 2021-08-19 ENCOUNTER — OFFICE VISIT (OUTPATIENT)
Dept: SURGERY | Facility: CLINIC | Age: 61
End: 2021-08-19
Payer: OTHER GOVERNMENT

## 2021-08-19 VITALS
SYSTOLIC BLOOD PRESSURE: 97 MMHG | HEIGHT: 65 IN | HEART RATE: 98 BPM | WEIGHT: 131 LBS | TEMPERATURE: 97 F | BODY MASS INDEX: 21.83 KG/M2 | DIASTOLIC BLOOD PRESSURE: 65 MMHG

## 2021-08-19 DIAGNOSIS — K75.0 ABSCESS OF LIVER: ICD-10-CM

## 2021-08-19 DIAGNOSIS — K75.0 ABSCESS OF LIVER: Primary | ICD-10-CM

## 2021-08-19 DIAGNOSIS — K86.2 PANCREAS CYST: ICD-10-CM

## 2021-08-19 PROCEDURE — 99499 UNLISTED E&M SERVICE: CPT | Mod: S$PBB,,, | Performed by: SURGERY

## 2021-08-19 PROCEDURE — 99999 PR PBB SHADOW E&M-EST. PATIENT-LVL IV: ICD-10-PCS | Mod: PBBFAC,,, | Performed by: SURGERY

## 2021-08-19 PROCEDURE — 99999 PR PBB SHADOW E&M-EST. PATIENT-LVL IV: CPT | Mod: PBBFAC,,, | Performed by: SURGERY

## 2021-08-19 PROCEDURE — 99499 NO LOS: ICD-10-PCS | Mod: S$PBB,,, | Performed by: SURGERY

## 2021-08-19 PROCEDURE — 99214 OFFICE O/P EST MOD 30 MIN: CPT | Mod: PBBFAC | Performed by: SURGERY

## 2021-09-24 ENCOUNTER — TELEPHONE (OUTPATIENT)
Dept: SURGERY | Facility: CLINIC | Age: 61
End: 2021-09-24

## 2021-10-06 ENCOUNTER — HOSPITAL ENCOUNTER (OUTPATIENT)
Dept: RADIOLOGY | Facility: HOSPITAL | Age: 61
Discharge: HOME | End: 2021-10-06
Attending: SURGERY
Payer: OTHER GOVERNMENT

## 2021-10-06 DIAGNOSIS — K75.0 ABSCESS OF LIVER: ICD-10-CM

## 2021-10-06 PROCEDURE — 71260 CT THORAX DX C+: CPT | Mod: TC

## 2021-10-06 PROCEDURE — A9698 NON-RAD CONTRAST MATERIALNOC: HCPCS | Performed by: SURGERY

## 2021-10-06 PROCEDURE — 71260 CT CHEST ABDOMEN PELVIS WITH CONTRAST (XPD): ICD-10-PCS | Mod: 26,,, | Performed by: RADIOLOGY

## 2021-10-06 PROCEDURE — 25500020 PHARM REV CODE 255: Performed by: SURGERY

## 2021-10-06 PROCEDURE — 74177 CT CHEST ABDOMEN PELVIS WITH CONTRAST (XPD): ICD-10-PCS | Mod: 26,,, | Performed by: RADIOLOGY

## 2021-10-06 PROCEDURE — 74177 CT ABD & PELVIS W/CONTRAST: CPT | Mod: TC

## 2021-10-06 PROCEDURE — 71260 CT THORAX DX C+: CPT | Mod: 26,,, | Performed by: RADIOLOGY

## 2021-10-06 PROCEDURE — 74177 CT ABD & PELVIS W/CONTRAST: CPT | Mod: 26,,, | Performed by: RADIOLOGY

## 2021-10-06 RX ADMIN — IOHEXOL 75 ML: 350 INJECTION, SOLUTION INTRAVENOUS at 12:10

## 2021-10-06 RX ADMIN — IOHEXOL 1000 ML: 9 SOLUTION ORAL at 11:10

## 2021-10-07 ENCOUNTER — TELEPHONE (OUTPATIENT)
Dept: SURGERY | Facility: CLINIC | Age: 61
End: 2021-10-07

## 2021-10-07 ENCOUNTER — OFFICE VISIT (OUTPATIENT)
Dept: SURGERY | Facility: CLINIC | Age: 61
End: 2021-10-07
Payer: OTHER GOVERNMENT

## 2021-10-07 VITALS
DIASTOLIC BLOOD PRESSURE: 73 MMHG | SYSTOLIC BLOOD PRESSURE: 127 MMHG | HEIGHT: 65 IN | WEIGHT: 139.88 LBS | TEMPERATURE: 96 F | HEART RATE: 75 BPM | BODY MASS INDEX: 23.31 KG/M2

## 2021-10-07 DIAGNOSIS — K86.2 PANCREAS CYST: Primary | ICD-10-CM

## 2021-10-07 PROCEDURE — 99999 PR PBB SHADOW E&M-EST. PATIENT-LVL IV: ICD-10-PCS | Mod: PBBFAC,,, | Performed by: SURGERY

## 2021-10-07 PROCEDURE — 99214 OFFICE O/P EST MOD 30 MIN: CPT | Mod: PBBFAC | Performed by: SURGERY

## 2021-10-07 PROCEDURE — 99999 PR PBB SHADOW E&M-EST. PATIENT-LVL IV: CPT | Mod: PBBFAC,,, | Performed by: SURGERY

## 2021-10-07 PROCEDURE — 99213 OFFICE O/P EST LOW 20 MIN: CPT | Mod: S$PBB,,, | Performed by: SURGERY

## 2021-10-07 PROCEDURE — 99213 PR OFFICE/OUTPT VISIT, EST, LEVL III, 20-29 MIN: ICD-10-PCS | Mod: S$PBB,,, | Performed by: SURGERY

## 2021-10-07 RX ORDER — AMOXICILLIN 500 MG/1
500 CAPSULE ORAL
COMMUNITY
Start: 2021-10-01 | End: 2022-09-30

## 2022-01-05 ENCOUNTER — HOSPITAL ENCOUNTER (OUTPATIENT)
Dept: RADIOLOGY | Facility: HOSPITAL | Age: 62
Discharge: HOME | End: 2022-01-05
Attending: SURGERY
Payer: OTHER GOVERNMENT

## 2022-01-05 DIAGNOSIS — K86.2 PANCREAS CYST: ICD-10-CM

## 2022-01-05 PROCEDURE — 74160 CT ABDOMEN W/CONTRAST: CPT | Mod: 26,,, | Performed by: RADIOLOGY

## 2022-01-05 PROCEDURE — A9698 NON-RAD CONTRAST MATERIALNOC: HCPCS | Performed by: SURGERY

## 2022-01-05 PROCEDURE — 25500020 PHARM REV CODE 255: Performed by: SURGERY

## 2022-01-05 PROCEDURE — 74160 CT ABDOMEN WITH CONTRAST: ICD-10-PCS | Mod: 26,,, | Performed by: RADIOLOGY

## 2022-01-05 PROCEDURE — 74160 CT ABDOMEN W/CONTRAST: CPT | Mod: TC

## 2022-01-05 RX ADMIN — IOHEXOL 500 ML: 9 SOLUTION ORAL at 11:01

## 2022-01-05 RX ADMIN — IOHEXOL 75 ML: 350 INJECTION, SOLUTION INTRAVENOUS at 12:01

## 2022-01-06 ENCOUNTER — OFFICE VISIT (OUTPATIENT)
Dept: SURGERY | Facility: CLINIC | Age: 62
End: 2022-01-06
Payer: OTHER GOVERNMENT

## 2022-01-06 VITALS
HEIGHT: 65 IN | DIASTOLIC BLOOD PRESSURE: 66 MMHG | HEART RATE: 82 BPM | WEIGHT: 141.81 LBS | TEMPERATURE: 97 F | BODY MASS INDEX: 23.63 KG/M2 | SYSTOLIC BLOOD PRESSURE: 98 MMHG

## 2022-01-06 DIAGNOSIS — D49.0 IPMN (INTRADUCTAL PAPILLARY MUCINOUS NEOPLASM): Primary | ICD-10-CM

## 2022-01-06 PROCEDURE — 99999 PR PBB SHADOW E&M-EST. PATIENT-LVL IV: ICD-10-PCS | Mod: PBBFAC,,, | Performed by: SURGERY

## 2022-01-06 PROCEDURE — 99214 OFFICE O/P EST MOD 30 MIN: CPT | Mod: PBBFAC | Performed by: SURGERY

## 2022-01-06 PROCEDURE — 99213 PR OFFICE/OUTPT VISIT, EST, LEVL III, 20-29 MIN: ICD-10-PCS | Mod: S$PBB,,, | Performed by: SURGERY

## 2022-01-06 PROCEDURE — 99213 OFFICE O/P EST LOW 20 MIN: CPT | Mod: S$PBB,,, | Performed by: SURGERY

## 2022-01-06 PROCEDURE — 99999 PR PBB SHADOW E&M-EST. PATIENT-LVL IV: CPT | Mod: PBBFAC,,, | Performed by: SURGERY

## 2022-01-06 NOTE — PROGRESS NOTES
Surgery Clinic Note - H and P    Subjective:     Jose Rafael Campbell is a 61 y.o. male with h/o of cholangitis, IPMN of uncinate process s/p Whipple performed by Dr. Paul King on 10/25/2019, panreatic cyst s/p drainage 7/2021 and liver abscess/bacteremia 7/2021 treated with IR drain and antibiotics who presents to clinic for f/u with CT scan. Scan today looks good, inflammation improved, liver cyst and hemangioma unchanged, stable nodular density near the surgical bed, 4mm lung nodule vs focal atelectasis. He is feeling well, gaining weight, and has a good appetite. Denies fevers/chills, nausea/vomting and is having regular bowel movements. He notes some occasional pressure/discomfort in the epigastric region, this is not painful. Additionally, he has some fatigue since getting back to working on his houseboat.       PMH:   Past Medical History:   Diagnosis Date    Abdominal pain     Cholangitis 6/30/2021    Diarrhea     IPMN (intraductal papillary mucinous neoplasm)     Nausea     Pancreas cyst     Pancreatic duct dilated     Pancreatitis        Past Surgical History:   Past Surgical History:   Procedure Laterality Date    APPENDECTOMY      ENDOSCOPIC ULTRASOUND OF UPPER GASTROINTESTINAL TRACT N/A 10/3/2019    Procedure: ULTRASOUND, UPPER GI TRACT, ENDOSCOPIC;  Surgeon: Harsh Clark MD;  Location: [address]);  Service: Endoscopy;  Laterality: N/A;    ESOPHAGOGASTRODUODENOSCOPY N/A 7/12/2021    Procedure: EGD (ESOPHAGOGASTRODUODENOSCOPY);  Surgeon: Mohsen Sosa MD;  Location: [facility] ([address] [place] [address]);  Service: Endoscopy;  Laterality: N/A;    INGUINAL HERNIA REPAIR      NISSEN FUNDOPLICATION      WHIPPLE PROCEDURE N/A 10/25/2019    Procedure: WHIPPLE PROCEDURE;  Surgeon: Paul King MD;  Location: [facility] OR [address] [place] [address];  Service: General;  Laterality: N/A;       Social History:  Social History     Socioeconomic History    Marital status:    Tobacco Use    Smoking status: Former Smoker      Packs/day: 1.00     Years: 10.00     Pack years: 10.00     Types: Cigarettes     Quit date: 1999     Years since quittin.0    Smokeless tobacco: Never Used   Substance and Sexual Activity    Alcohol use: Not Currently    Drug use: Never       Allergies:   Review of patient's allergies indicates:   Allergen Reactions    Codeine Other (See Comments)     headache    Aspirin Rash       Medications:  Current Outpatient Medications:     acetaminophen (TYLENOL) 325 MG tablet, Take 650 mg by mouth. Pt taking PRN, Disp: , Rfl:     amoxicillin (AMOXIL) 500 MG capsule, Take by mouth., Disp: , Rfl:     famotidine (PEPCID) 40 MG tablet, Take 40 mg by mouth once daily., Disp: , Rfl:     indomethacin (INDOCIN) 25 MG capsule, , Disp: , Rfl:     omeprazole (PRILOSEC) 40 MG capsule, Take 40 mg by mouth 2 (two) times daily., Disp: , Rfl:     simvastatin (ZOCOR) 40 MG tablet, Take 40 mg by mouth., Disp: , Rfl:     vit B comp-C-FA-iron-vit E 500 mg-400 mcg- 18 mg iron Tab, Take 1 tablet by mouth., Disp: , Rfl:     vitamin D (VITAMIN D3) 1000 units Tab, Take 1,000 Units by mouth., Disp: , Rfl:     amoxicillin (AMOXIL) 500 MG capsule, Take 500 mg by mouth., Disp: , Rfl:     COVID-19 vacc, mRNA,Pfizer,/PF (PFIZER COVID-19 VACCINE, EUA, IM), Inject 1 application into the muscle., Disp: , Rfl:     flu vacc [id number] 6mos up,PF, (FLUARIX QUAD 0415-8806, PF,) 60 mcg (15 mcg x 4)/0.5 mL Syrg, , Disp: , Rfl:     sucralfate (CARAFATE) 100 mg/mL suspension, Take 1 g by mouth., Disp: , Rfl:   No current facility-administered medications for this visit.    Current Outpatient Medications on File Prior to Visit   Medication Sig Dispense Refill    acetaminophen (TYLENOL) 325 MG tablet Take 650 mg by mouth. Pt taking PRN      amoxicillin (AMOXIL) 500 MG capsule Take by mouth.      famotidine (PEPCID) 40 MG tablet Take 40 mg by mouth once daily.      indomethacin (INDOCIN) 25 MG capsule       omeprazole (PRILOSEC) 40  MG capsule Take 40 mg by mouth 2 (two) times daily.      simvastatin (ZOCOR) 40 MG tablet Take 40 mg by mouth.      vit B comp-C-FA-iron-vit E 500 mg-400 mcg- 18 mg iron Tab Take 1 tablet by mouth.      vitamin D (VITAMIN D3) 1000 units Tab Take 1,000 Units by mouth.      amoxicillin (AMOXIL) 500 MG capsule Take 500 mg by mouth.      COVID-19 vacc, mRNA,Pfizer,/PF (PFIZER COVID-19 VACCINE, EUA, IM) Inject 1 application into the muscle.      flu vacc [id number] 6mos up,PF, (FLUARIX QUAD 4030-5431, PF,) 60 mcg (15 mcg x 4)/0.5 mL Syrg       sucralfate (CARAFATE) 100 mg/mL suspension Take 1 g by mouth.      [DISCONTINUED] LIDOcaine (LIDODERM) 5 % Place 1 patch onto the skin once daily. Remove & Discard patch within 12 hours or as directed by MD (Patient not taking: Reported on 10/7/2021) 30 patch 0     Current Facility-Administered Medications on File Prior to Visit   Medication Dose Route Frequency Provider Last Rate Last Admin    [COMPLETED] iohexoL (OMNIPAQUE 350) injection 75 mL  75 mL Intravenous ONCE PRN Paul King MD   75 mL at 01/05/22 1230    [COMPLETED] iohexoL (OMNIPAQUE 9) oral solution 500 mL  500 mL Oral ONCE PRN Paul King MD   500 mL at 01/05/22 1145         Objective:     PHYSICAL EXAM:  Vital Signs (Most Recent)  Temp: 97 °F (36.1 °C) (01/06/22 0858)  Pulse: 82 (01/06/22 0858)  BP: 98/66 (01/06/22 0858)    Review of Systems   Constitutional: Positive for malaise/fatigue. Negative for chills and fever.   Respiratory: Negative for shortness of breath and wheezing.    Cardiovascular: Negative for chest pain and palpitations.   Gastrointestinal: Negative for abdominal pain, blood in stool, constipation, diarrhea, heartburn, melena, nausea and vomiting.   Genitourinary: Negative for dysuria, frequency and urgency.   Musculoskeletal: Negative for back pain.   Neurological: Negative for dizziness and headaches.    A 10+ review of systems was performed with pertinent positives and  negatives noted above in the history of present illness.  Other systems were negative unless otherwise specified.    Physical Exam  Constitutional:       General: He is not in acute distress.     Appearance: Normal appearance. He is not ill-appearing.   HENT:      Head: Normocephalic and atraumatic.   Cardiovascular:      Rate and Rhythm: Normal rate and regular rhythm.      Pulses: Normal pulses.   Pulmonary:      Effort: Pulmonary effort is normal. No respiratory distress.   Abdominal:      General: There is no distension.      Palpations: Abdomen is soft.      Tenderness: There is no abdominal tenderness. There is no guarding.      Hernia: No hernia is present.      Comments: Midline incision healing well, soft, nontender abdomen   Neurological:      General: No focal deficit present.      Mental Status: He is alert and oriented to person, place, and time.   Psychiatric:         Mood and Affect: Mood normal.         Behavior: Behavior normal.         Thought Content: Thought content normal.         Judgment: Judgment normal.                   Pertinent imaging/labs:   reviewed      Assessment:     61 y.o. male with history of IPMN of uncinate process s/p Whipple performed by Dr. Paul King on 10/25/2019, panreatic cyst s/p drainage 7/2021 and liver abscess/bacteremia 7/2021treated with IR drain and antibiotics presenting for f/u with labs and CT    Plan:     - CT stable, inflammation resolved  - CA 19-9 9.1 from 20.0 a year ago  - yearly surveillance for IPMN    Suleiman Hamilton MD   [facility]

## 2022-02-07 ENCOUNTER — TELEPHONE (OUTPATIENT)
Dept: SURGERY | Facility: CLINIC | Age: 62
End: 2022-02-07
Payer: OTHER GOVERNMENT

## 2022-02-07 NOTE — TELEPHONE ENCOUNTER
----- Message from Ricci Escobar sent at 2/7/2022  8:34 AM CST -----  Contact: Patient  Patient requesting call back in regards to pay for last week visit.    [phone] (home)

## 2022-02-14 ENCOUNTER — TELEPHONE (OUTPATIENT)
Dept: SURGERY | Facility: CLINIC | Age: 62
End: 2022-02-14
Payer: OTHER GOVERNMENT

## 2022-02-14 NOTE — TELEPHONE ENCOUNTER
Returned pt spouse call. Pt received call from his PCP from [facility]. Per pt spouse, pt's PCP said he saw a spot on pt's liver. Pt PCP is wanting to order a CT scan in April. Pt spouse wanting to know what imaging Dr. King wants in 2023. Instructed pt spouse Dr. King would like MRI/MRCP. Pt has appt with ID on Wednesday to evaluate antibiotics. Pt is bringing a copy of most recent CT on a disc to the ID and will have them review the disc. If ID wants the pt to have another CT scan then pt or pt spouse will contact us. Instructed pt spouse to call the office with any additional questions or concerns. Pt spouse verbalized understanding.

## 2022-02-14 NOTE — TELEPHONE ENCOUNTER
----- Message from Elizabeth Coulter sent at 2/14/2022  9:50 AM CST -----  Regarding: Call back  Contact: [phone]  Type:  Patient Call Back    Who Called:pt  What this is regarding?:what kind of ultrasound did  want pt to get in 2023  Would the patient rather a call back or a response via [facility]? Call back  Best Call Back Number:[phone]  Additional Information:     Advised to call back directly if there are further questions, or if these symptoms fail to improve as anticipated or worsen.

## 2022-02-14 NOTE — TELEPHONE ENCOUNTER
Pt spouse called back. Pt spouse stated PCP thought they saw a spot on the lung not liver as she previously stated in prior phone call. Pt spouse will keep the office informed if pt has another CT scan.

## 2022-02-14 NOTE — TELEPHONE ENCOUNTER
----- Message from Elizabeth Coulter sent at 2/14/2022 11:05 AM CST -----  Regarding: call back  Contact: [phone]  Type:  Patient Call Back    Who Called:pt  What this is regarding?:for brenda : pt has spot on lung not liver // does not have to call back   Would the patient rather a call back or a response via [facility]? Call back  Best Call Back Number:[phone]  Additional Information:     Advised to call back directly if there are further questions, or if these symptoms fail to improve as anticipated or worsen.

## 2022-03-21 ENCOUNTER — APPOINTMENT (RX ONLY)
Dept: URBAN - METROPOLITAN AREA CLINIC 158 | Facility: CLINIC | Age: 62
Setting detail: DERMATOLOGY
End: 2022-03-21

## 2022-03-21 DIAGNOSIS — L91.8 OTHER HYPERTROPHIC DISORDERS OF THE SKIN: ICD-10-CM

## 2022-03-21 DIAGNOSIS — L57.0 ACTINIC KERATOSIS: ICD-10-CM

## 2022-03-21 DIAGNOSIS — L82.1 OTHER SEBORRHEIC KERATOSIS: ICD-10-CM

## 2022-03-21 DIAGNOSIS — L56.8 OTHER SPECIFIED ACUTE SKIN CHANGES DUE TO ULTRAVIOLET RADIATION: ICD-10-CM

## 2022-03-21 DIAGNOSIS — L57.8 OTHER SKIN CHANGES DUE TO CHRONIC EXPOSURE TO NONIONIZING RADIATION: ICD-10-CM

## 2022-03-21 DIAGNOSIS — L81.4 OTHER MELANIN HYPERPIGMENTATION: ICD-10-CM

## 2022-03-21 PROCEDURE — 17003 DESTRUCT PREMALG LES 2-14: CPT

## 2022-03-21 PROCEDURE — 99213 OFFICE O/P EST LOW 20 MIN: CPT | Mod: 25

## 2022-03-21 PROCEDURE — ? LIQUID NITROGEN

## 2022-03-21 PROCEDURE — 17000 DESTRUCT PREMALG LESION: CPT

## 2022-03-21 PROCEDURE — ? COUNSELING

## 2022-03-21 ASSESSMENT — LOCATION SIMPLE DESCRIPTION DERM
LOCATION SIMPLE: LEFT EAR
LOCATION SIMPLE: LEFT UPPER BACK
LOCATION SIMPLE: LEFT THIGH
LOCATION SIMPLE: LEFT CHEEK
LOCATION SIMPLE: LEFT FOREHEAD
LOCATION SIMPLE: NOSE
LOCATION SIMPLE: RIGHT FOREHEAD
LOCATION SIMPLE: RIGHT LIP
LOCATION SIMPLE: RIGHT CHEEK
LOCATION SIMPLE: LEFT TEMPLE
LOCATION SIMPLE: CHEST

## 2022-03-21 ASSESSMENT — LOCATION DETAILED DESCRIPTION DERM
LOCATION DETAILED: RIGHT SUPERIOR CENTRAL MALAR CHEEK
LOCATION DETAILED: LEFT SUPERIOR UPPER BACK
LOCATION DETAILED: RIGHT FOREHEAD
LOCATION DETAILED: RIGHT INFERIOR VERMILION LIP
LOCATION DETAILED: RIGHT INFERIOR CENTRAL MALAR CHEEK
LOCATION DETAILED: LEFT ANTERIOR DISTAL THIGH
LOCATION DETAILED: LEFT SUPERIOR CENTRAL MALAR CHEEK
LOCATION DETAILED: LEFT MEDIAL SUPERIOR CHEST
LOCATION DETAILED: NASAL TIP
LOCATION DETAILED: NASAL SUPRATIP
LOCATION DETAILED: LEFT FOREHEAD
LOCATION DETAILED: LEFT ANTIHELIX
LOCATION DETAILED: LEFT CENTRAL TEMPLE

## 2022-03-21 ASSESSMENT — LOCATION ZONE DERM
LOCATION ZONE: LEG
LOCATION ZONE: FACE
LOCATION ZONE: EAR
LOCATION ZONE: TRUNK
LOCATION ZONE: LIP
LOCATION ZONE: NOSE

## 2022-11-17 ENCOUNTER — TELEPHONE (OUTPATIENT)
Dept: SURGERY | Facility: CLINIC | Age: 62
End: 2022-11-17
Payer: OTHER GOVERNMENT

## 2022-11-17 DIAGNOSIS — K86.2 PANCREAS CYST: Primary | ICD-10-CM

## 2022-11-17 NOTE — TELEPHONE ENCOUNTER
----- Message from Crystal Amor CMA sent at 11/17/2022 11:04 AM CST -----  Regarding: Appt  Contact: Spouse @ [phone]  Caller: Spouse  Reason for call: Pt would like to know where to get Ultrasound done for visit in Jan 2023.  Please call.

## 2022-11-17 NOTE — TELEPHONE ENCOUNTER
Returned call. Spoke to pt spouse. Requesting MRCP on a Tuesday and clinic follow up with Dr. King the following day. Pt spouse declined appt with NP. Appts made per request. Pt spouse verbally acknowledged all appt details.

## 2023-01-09 DIAGNOSIS — K86.2 PANCREAS CYST: Primary | ICD-10-CM

## 2023-01-10 ENCOUNTER — HOSPITAL ENCOUNTER (OUTPATIENT)
Dept: RADIOLOGY | Facility: HOSPITAL | Age: 63
Discharge: HOME | End: 2023-01-10
Attending: SURGERY
Payer: OTHER GOVERNMENT

## 2023-01-10 DIAGNOSIS — K86.2 PANCREAS CYST: ICD-10-CM

## 2023-01-10 PROCEDURE — 25500020 PHARM REV CODE 255: Performed by: SURGERY

## 2023-01-10 PROCEDURE — 76376 3D RENDER W/INTRP POSTPROCES: CPT | Mod: TC

## 2023-01-10 PROCEDURE — 74183 MRI ABD W/O CNTR FLWD CNTR: CPT | Mod: TC

## 2023-01-10 PROCEDURE — A9585 GADOBUTROL INJECTION: HCPCS | Performed by: SURGERY

## 2023-01-10 PROCEDURE — 74183 MRI ABD W/O CNTR FLWD CNTR: CPT | Mod: 26,,, | Performed by: RADIOLOGY

## 2023-01-10 PROCEDURE — 76376 MRI ABDOMEN WITH AND WO_INC MRCP (XPD): ICD-10-PCS | Mod: 26,,, | Performed by: RADIOLOGY

## 2023-01-10 PROCEDURE — 76376 3D RENDER W/INTRP POSTPROCES: CPT | Mod: 26,,, | Performed by: RADIOLOGY

## 2023-01-10 PROCEDURE — 74183 MRI ABDOMEN WITH AND WO_INC MRCP (XPD): ICD-10-PCS | Mod: 26,,, | Performed by: RADIOLOGY

## 2023-01-10 RX ORDER — GADOBUTROL 604.72 MG/ML
6 INJECTION INTRAVENOUS
Status: COMPLETED | OUTPATIENT
Start: 2023-01-10 | End: 2023-01-10

## 2023-01-10 RX ADMIN — GADOBUTROL 6 ML: 604.72 INJECTION INTRAVENOUS at 11:01

## 2023-01-11 ENCOUNTER — OFFICE VISIT (OUTPATIENT)
Dept: SURGERY | Facility: CLINIC | Age: 63
End: 2023-01-11
Payer: OTHER GOVERNMENT

## 2023-01-11 ENCOUNTER — TELEPHONE (OUTPATIENT)
Dept: SURGERY | Facility: CLINIC | Age: 63
End: 2023-01-11
Payer: OTHER GOVERNMENT

## 2023-01-11 VITALS
BODY MASS INDEX: 23.82 KG/M2 | HEART RATE: 84 BPM | WEIGHT: 143 LBS | TEMPERATURE: 98 F | OXYGEN SATURATION: 95 % | HEIGHT: 65 IN | SYSTOLIC BLOOD PRESSURE: 108 MMHG | DIASTOLIC BLOOD PRESSURE: 74 MMHG

## 2023-01-11 DIAGNOSIS — D49.0 IPMN (INTRADUCTAL PAPILLARY MUCINOUS NEOPLASM): Primary | ICD-10-CM

## 2023-01-11 PROCEDURE — 99213 OFFICE O/P EST LOW 20 MIN: CPT | Mod: PBBFAC | Performed by: SURGERY

## 2023-01-11 PROCEDURE — 99999 PR PBB SHADOW E&M-EST. PATIENT-LVL III: ICD-10-PCS | Mod: PBBFAC,,, | Performed by: SURGERY

## 2023-01-11 PROCEDURE — 99213 OFFICE O/P EST LOW 20 MIN: CPT | Mod: S$PBB,,, | Performed by: SURGERY

## 2023-01-11 PROCEDURE — 99213 PR OFFICE/OUTPT VISIT, EST, LEVL III, 20-29 MIN: ICD-10-PCS | Mod: S$PBB,,, | Performed by: SURGERY

## 2023-01-11 PROCEDURE — 99999 PR PBB SHADOW E&M-EST. PATIENT-LVL III: CPT | Mod: PBBFAC,,, | Performed by: SURGERY

## 2023-01-11 RX ORDER — LACTOBACILLUS ACIDOPHILUS 500MM CELL
CAPSULE ORAL
COMMUNITY
Start: 2022-10-03

## 2023-01-11 NOTE — TELEPHONE ENCOUNTER
----- Message from Twyla Felipe sent at 1/11/2023  8:22 AM CST -----  Regarding: appt today, 1/11/23 @ 11am; ask for early time  Contact: Tiffany (wife) @ [phone]  Caller, Tiffany (wife) is calling to speak to someone in the office to ask if anyone has canceled this morning, for Dr. King. Caller states that they have arrived to campus, but wanted to know if pt can come earlier than 11am, to see Dr. King. If no availability, then caller says that they will still keep appt at 11am today. Please call to advise. Thanks.

## 2023-01-11 NOTE — TELEPHONE ENCOUNTER
Returned call. Spoke to spouse. Pt and spouse have arrived and requesting to see Dr King earlier. Offered appt with NP. Pt spouse declined. Aware Dr. King may be in surgery this morning. Pt spouse prefers to see only Dr. King. Will call pt and spouse later with an update.

## 2023-01-17 NOTE — PROGRESS NOTES
Jose Rafael is doing well, had an illness recently but is over it and looks good. MRCP is stable, no PD dilation. Continue yearly surveillance. Offered them imaging at home and me reviewing it to save the drive, but they [place] and make a trip out of it.    I spent 20 minutes with Jose Rafael and his wife, with >50% of time spent face to face and additional time preparing to see the patient (eg, review of tests), obtaining and/or reviewing separately obtained history, documenting clinical information in the electronic or other health record, independently interpreting results (not separately reported) and communicating results to the patient/family/caregiver, or Care coordination (not separately reported).         Paul King MD  Upper GI / Hepatobiliary Surgical Oncology  [facility] [place], [place]  Office: [phone]  Fax: [phone]

## 2023-02-07 ENCOUNTER — TELEPHONE (OUTPATIENT)
Dept: SURGERY | Facility: CLINIC | Age: 63
End: 2023-02-07
Payer: OTHER GOVERNMENT

## 2023-02-07 NOTE — TELEPHONE ENCOUNTER
----- Message from Aleyda Solorzano MA sent at 2/7/2023 10:48 AM CST -----  Type:  Patient Returning Call    Who Called: pt wife  Does the patient know what this is regarding?: yes  Would the patient rather a call back or a response via [facility]? Call back   Best Call Back Number: [phone]  Additional Information:  pt requesting a call back in regards to appt on 1/11/23

## 2023-02-07 NOTE — TELEPHONE ENCOUNTER
Returned call. Pt spouse. Spouse indicating pt's ID doctor is having difficulty retrieving Dr. King's note electronically.  Informed her to contact the office or medical records if notes need to be sent electronically and signed release will be needed. Pt spouse verbalized understanding.

## 2023-08-09 ENCOUNTER — APPOINTMENT (RX ONLY)
Dept: URBAN - METROPOLITAN AREA CLINIC 158 | Facility: CLINIC | Age: 63
Setting detail: DERMATOLOGY
End: 2023-08-09

## 2023-08-09 DIAGNOSIS — L57.0 ACTINIC KERATOSIS: ICD-10-CM

## 2023-08-09 DIAGNOSIS — D485 NEOPLASM OF UNCERTAIN BEHAVIOR OF SKIN: ICD-10-CM

## 2023-08-09 DIAGNOSIS — L85.3 XEROSIS CUTIS: ICD-10-CM

## 2023-08-09 DIAGNOSIS — L82.1 OTHER SEBORRHEIC KERATOSIS: ICD-10-CM

## 2023-08-09 DIAGNOSIS — L56.8 OTHER SPECIFIED ACUTE SKIN CHANGES DUE TO ULTRAVIOLET RADIATION: ICD-10-CM

## 2023-08-09 DIAGNOSIS — D22 MELANOCYTIC NEVI: ICD-10-CM

## 2023-08-09 DIAGNOSIS — L57.8 OTHER SKIN CHANGES DUE TO CHRONIC EXPOSURE TO NONIONIZING RADIATION: ICD-10-CM

## 2023-08-09 PROBLEM — D22.39 MELANOCYTIC NEVI OF OTHER PARTS OF FACE: Status: ACTIVE | Noted: 2023-08-09

## 2023-08-09 PROBLEM — D37.01 NEOPLASM OF UNCERTAIN BEHAVIOR OF LIP: Status: ACTIVE | Noted: 2023-08-09

## 2023-08-09 PROCEDURE — ? BIOPSY BY SHAVE METHOD

## 2023-08-09 PROCEDURE — 17000 DESTRUCT PREMALG LESION: CPT

## 2023-08-09 PROCEDURE — 40490 BIOPSY OF LIP: CPT | Mod: 59

## 2023-08-09 PROCEDURE — ? COUNSELING

## 2023-08-09 PROCEDURE — ? LIQUID NITROGEN

## 2023-08-09 PROCEDURE — 17003 DESTRUCT PREMALG LES 2-14: CPT

## 2023-08-09 PROCEDURE — 99213 OFFICE O/P EST LOW 20 MIN: CPT | Mod: 25

## 2023-08-09 ASSESSMENT — LOCATION ZONE DERM
LOCATION ZONE: FACE
LOCATION ZONE: LIP
LOCATION ZONE: LEG
LOCATION ZONE: TRUNK

## 2023-08-09 ASSESSMENT — LOCATION DETAILED DESCRIPTION DERM
LOCATION DETAILED: RIGHT MEDIAL FOREHEAD
LOCATION DETAILED: LEFT LATERAL MALAR CHEEK
LOCATION DETAILED: LEFT INFERIOR CENTRAL MALAR CHEEK
LOCATION DETAILED: LEFT INFERIOR VERMILION LIP
LOCATION DETAILED: STERNAL NOTCH
LOCATION DETAILED: RIGHT FOREHEAD
LOCATION DETAILED: RIGHT INFERIOR TEMPLE
LOCATION DETAILED: SUPERIOR MID FOREHEAD
LOCATION DETAILED: LEFT DISTAL PRETIBIAL REGION
LOCATION DETAILED: RIGHT SUPERIOR LATERAL BUCCAL CHEEK
LOCATION DETAILED: RIGHT MID TEMPLE

## 2023-08-09 ASSESSMENT — LOCATION SIMPLE DESCRIPTION DERM
LOCATION SIMPLE: RIGHT TEMPLE
LOCATION SIMPLE: RIGHT CHEEK
LOCATION SIMPLE: RIGHT FOREHEAD
LOCATION SIMPLE: SUPERIOR FOREHEAD
LOCATION SIMPLE: CHEST
LOCATION SIMPLE: LEFT PRETIBIAL REGION
LOCATION SIMPLE: LEFT CHEEK
LOCATION SIMPLE: LEFT LIP

## 2023-08-17 ENCOUNTER — RX ONLY (OUTPATIENT)
Age: 63
Setting detail: RX ONLY
End: 2023-08-17

## 2023-08-17 RX ORDER — FLUOROURACIL 5 MG/G
APPLY CREAM TOPICAL
Qty: 40 | Refills: 0 | Status: ERX | COMMUNITY
Start: 2023-08-17

## 2023-11-21 ENCOUNTER — TELEPHONE (OUTPATIENT)
Dept: SURGERY | Facility: CLINIC | Age: 63
End: 2023-11-21
Payer: MEDICARE

## 2023-11-21 DIAGNOSIS — D49.0 IPMN (INTRADUCTAL PAPILLARY MUCINOUS NEOPLASM): Primary | ICD-10-CM

## 2023-11-21 NOTE — TELEPHONE ENCOUNTER
Call placed to pt to schedule yearly surveillance appts. Pt requested this RN to speak to his wife and transferred phone call. Surveillance appts scheduled per request. Time date and location provided. Pt spouse verbalized understanding.

## 2024-01-09 ENCOUNTER — HOSPITAL ENCOUNTER (OUTPATIENT)
Dept: RADIOLOGY | Facility: HOSPITAL | Age: 64
Discharge: HOME | End: 2024-01-09
Attending: SURGERY
Payer: MEDICARE

## 2024-01-09 DIAGNOSIS — D49.0 IPMN (INTRADUCTAL PAPILLARY MUCINOUS NEOPLASM): ICD-10-CM

## 2024-01-09 LAB
ALLENS TEST: NORMAL
CREAT SERPL-MCNC: 1 MG/DL (ref 0.5–1.4)
SAMPLE: NORMAL

## 2024-01-09 PROCEDURE — 76376 3D RENDER W/INTRP POSTPROCES: CPT | Mod: TC

## 2024-01-09 PROCEDURE — A9585 GADOBUTROL INJECTION: HCPCS | Performed by: SURGERY

## 2024-01-09 PROCEDURE — 76376 3D RENDER W/INTRP POSTPROCES: CPT | Mod: 26,,, | Performed by: RADIOLOGY

## 2024-01-09 PROCEDURE — 82565 ASSAY OF CREATININE: CPT

## 2024-01-09 PROCEDURE — 74183 MRI ABD W/O CNTR FLWD CNTR: CPT | Mod: 26,,, | Performed by: RADIOLOGY

## 2024-01-09 PROCEDURE — 25500020 PHARM REV CODE 255: Performed by: SURGERY

## 2024-01-09 PROCEDURE — 99900035 HC TECH TIME PER 15 MIN (STAT)

## 2024-01-09 RX ORDER — GADOBUTROL 604.72 MG/ML
6 INJECTION INTRAVENOUS
Status: COMPLETED | OUTPATIENT
Start: 2024-01-09 | End: 2024-01-09

## 2024-01-09 RX ADMIN — GADOBUTROL 6 ML: 604.72 INJECTION INTRAVENOUS at 02:01

## 2024-01-10 ENCOUNTER — OFFICE VISIT (OUTPATIENT)
Dept: SURGERY | Facility: CLINIC | Age: 64
End: 2024-01-10
Payer: MEDICARE

## 2024-01-10 VITALS
OXYGEN SATURATION: 94 % | HEIGHT: 65 IN | SYSTOLIC BLOOD PRESSURE: 124 MMHG | WEIGHT: 143.75 LBS | DIASTOLIC BLOOD PRESSURE: 80 MMHG | BODY MASS INDEX: 23.95 KG/M2 | HEART RATE: 79 BPM

## 2024-01-10 DIAGNOSIS — K86.2 PANCREAS CYST: Primary | ICD-10-CM

## 2024-01-10 PROCEDURE — 99213 OFFICE O/P EST LOW 20 MIN: CPT | Mod: S$PBB,,, | Performed by: SURGERY

## 2024-01-10 PROCEDURE — 99213 OFFICE O/P EST LOW 20 MIN: CPT | Mod: PBBFAC | Performed by: SURGERY

## 2024-01-10 PROCEDURE — 99999 PR PBB SHADOW E&M-EST. PATIENT-LVL III: CPT | Mod: PBBFAC,,, | Performed by: SURGERY

## 2024-01-10 RX ORDER — ESOMEPRAZOLE MAGNESIUM 40 MG/1
CAPSULE, DELAYED RELEASE ORAL
COMMUNITY
Start: 2023-07-24 | End: 2024-05-16

## 2024-01-10 NOTE — PROGRESS NOTES
Jose Rafael is s/p whipple for main duct IPMN 10/2019 with HGD but no invasive cancer.    FINAL PATHOLOGIC DIAGNOSIS 1. LYMPH NODE, HEPATIC ARTERY, EXCISION: - One reactive lymph node (0/1) 2. GALLBLADDER, CHOLECYSTECTOMY: - Chronic cholecystitis 3. PANCREAS, DUODENUM, AND STOMACH, PANCREATICODUODENECTOMY, NON-PYLORUS PRESERVING ([id number]): - Intraductal papillary mucinous neoplasm (IPMN), intestinal type, with low and focal high-grade cytoarchitectural dysplasia, measuring 3.3 x 2.2 x 1.7 cm - No invasive component identified - IPMN < 1mm from nearest (retroperitoneal) surgical margin - Six reactive lymph nodes (0/6) - Background pancreas with chronic inflammation and atrophic changes - Incidental hyperplastic polyp (1.0 cm) in duodenum - Antral-type gastric mucosa with no significant histopathologic abnormality    Jose Rafael is doing well overall. He has no abdominal pain, is tolerating his diet, and has gained some healthy weight. He is having some reflux symptoms which is being attributed to failure of his prior Nissen. 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 MRCP is reviewed by me, no PD dilation and if anything I think his PD looks more normal than last year. Continue yearly surveillance. If wnl next year, we discussed transitioning to every other year or VV.         I spent 20 minutes with Jose Rafael and his wife, with >50% of time spent face to face and additional time preparing to see the patient (eg, review of tests), obtaining and/or reviewing separately obtained history, documenting clinical information in the electronic or other health record, independently interpreting results (not separately reported) and communicating results to the patient/family/caregiver, or Care coordination (not separately reported).           Paul King MD  Upper GI / Hepatobiliary Surgical Oncology  [facility] [place], [place]  Office: [phone]  Fax:     [phone]

## 2024-01-11 ENCOUNTER — DOCUMENTATION ONLY (OUTPATIENT)
Dept: SURGERY | Facility: CLINIC | Age: 64
End: 2024-01-11
Payer: MEDICARE

## 2024-07-24 PROBLEM — K75.0 LIVER ABSCESS: Status: ACTIVE | Noted: 2024-07-24

## 2024-07-25 ENCOUNTER — HOSPITAL ENCOUNTER (INPATIENT)
Facility: HOSPITAL | Age: 64
LOS: 3 days | Discharge: HOME | DRG: 392 | End: 2024-07-28
Attending: STUDENT IN AN ORGANIZED HEALTH CARE EDUCATION/TRAINING PROGRAM | Admitting: STUDENT IN AN ORGANIZED HEALTH CARE EDUCATION/TRAINING PROGRAM
Payer: MEDICARE

## 2024-07-25 DIAGNOSIS — R07.9 CHEST PAIN: ICD-10-CM

## 2024-07-25 DIAGNOSIS — K86.2 PANCREAS CYST: Primary | ICD-10-CM

## 2024-07-25 DIAGNOSIS — K75.0 LIVER ABSCESS: ICD-10-CM

## 2024-07-25 LAB
ALBUMIN SERPL BCP-MCNC: 3.7 G/DL (ref 3.5–5.2)
ALP SERPL-CCNC: 59 U/L (ref 55–135)
ALT SERPL W/O P-5'-P-CCNC: 22 U/L (ref 10–44)
ANION GAP SERPL CALC-SCNC: 12 MMOL/L (ref 8–16)
AST SERPL-CCNC: 28 U/L (ref 10–40)
BASOPHILS # BLD AUTO: 0.03 K/UL (ref 0–0.2)
BASOPHILS NFR BLD: 0.3 % (ref 0–1.9)
BILIRUB SERPL-MCNC: 0.4 MG/DL (ref 0.1–1)
BUN SERPL-MCNC: 17 MG/DL (ref 8–23)
CALCIUM SERPL-MCNC: 8.6 MG/DL (ref 8.7–10.5)
CHLORIDE SERPL-SCNC: 104 MMOL/L (ref 95–110)
CO2 SERPL-SCNC: 19 MMOL/L (ref 23–29)
CREAT SERPL-MCNC: 0.7 MG/DL (ref 0.5–1.4)
DIFFERENTIAL METHOD BLD: ABNORMAL
EOSINOPHIL # BLD AUTO: 0 K/UL (ref 0–0.5)
EOSINOPHIL NFR BLD: 0.4 % (ref 0–8)
ERYTHROCYTE [DISTWIDTH] IN BLOOD BY AUTOMATED COUNT: 14 % (ref 11.5–14.5)
EST. GFR  (NO RACE VARIABLE): >60 ML/MIN/1.73 M^2
GLUCOSE SERPL-MCNC: 63 MG/DL (ref 70–110)
HCT VFR BLD AUTO: 38.4 % (ref 40–54)
HGB BLD-MCNC: 12.2 G/DL (ref 14–18)
IMM GRANULOCYTES # BLD AUTO: 0.06 K/UL (ref 0–0.04)
IMM GRANULOCYTES NFR BLD AUTO: 0.6 % (ref 0–0.5)
LACTATE SERPL-SCNC: 0.5 MMOL/L (ref 0.5–1.9)
LYMPHOCYTES # BLD AUTO: 0.6 K/UL (ref 1–4.8)
LYMPHOCYTES NFR BLD: 5.8 % (ref 18–48)
MCH RBC QN AUTO: 30.4 PG (ref 27–31)
MCHC RBC AUTO-ENTMCNC: 31.8 G/DL (ref 32–36)
MCV RBC AUTO: 96 FL (ref 82–98)
MONOCYTES # BLD AUTO: 0.7 K/UL (ref 0.3–1)
MONOCYTES NFR BLD: 7.3 % (ref 4–15)
NEUTROPHILS # BLD AUTO: 8.4 K/UL (ref 1.8–7.7)
NEUTROPHILS NFR BLD: 85.6 % (ref 38–73)
NRBC BLD-RTO: 0 /100 WBC
PLATELET # BLD AUTO: 185 K/UL (ref 150–450)
PMV BLD AUTO: 8.8 FL (ref 9.2–12.9)
POTASSIUM SERPL-SCNC: 4.3 MMOL/L (ref 3.5–5.1)
PROT SERPL-MCNC: 6.4 G/DL (ref 6–8.4)
RBC # BLD AUTO: 4.01 M/UL (ref 4.6–6.2)
SODIUM SERPL-SCNC: 135 MMOL/L (ref 136–145)
WBC # BLD AUTO: 9.78 K/UL (ref 3.9–12.7)

## 2024-07-25 PROCEDURE — 25000003 PHARM REV CODE 250: Performed by: STUDENT IN AN ORGANIZED HEALTH CARE EDUCATION/TRAINING PROGRAM

## 2024-07-25 PROCEDURE — 25500020 PHARM REV CODE 255: Performed by: STUDENT IN AN ORGANIZED HEALTH CARE EDUCATION/TRAINING PROGRAM

## 2024-07-25 PROCEDURE — 80053 COMPREHEN METABOLIC PANEL: CPT | Performed by: STUDENT IN AN ORGANIZED HEALTH CARE EDUCATION/TRAINING PROGRAM

## 2024-07-25 PROCEDURE — 85025 COMPLETE CBC W/AUTO DIFF WBC: CPT | Performed by: STUDENT IN AN ORGANIZED HEALTH CARE EDUCATION/TRAINING PROGRAM

## 2024-07-25 PROCEDURE — 83605 ASSAY OF LACTIC ACID: CPT | Performed by: STUDENT IN AN ORGANIZED HEALTH CARE EDUCATION/TRAINING PROGRAM

## 2024-07-25 PROCEDURE — 12000002 HC ACUTE/MED SURGE SEMI-PRIVATE ROOM

## 2024-07-25 PROCEDURE — 36415 COLL VENOUS BLD VENIPUNCTURE: CPT | Performed by: STUDENT IN AN ORGANIZED HEALTH CARE EDUCATION/TRAINING PROGRAM

## 2024-07-25 PROCEDURE — A9585 GADOBUTROL INJECTION: HCPCS | Performed by: STUDENT IN AN ORGANIZED HEALTH CARE EDUCATION/TRAINING PROGRAM

## 2024-07-25 PROCEDURE — 87040 BLOOD CULTURE FOR BACTERIA: CPT | Performed by: STUDENT IN AN ORGANIZED HEALTH CARE EDUCATION/TRAINING PROGRAM

## 2024-07-25 PROCEDURE — 63600175 PHARM REV CODE 636 W HCPCS: Performed by: STUDENT IN AN ORGANIZED HEALTH CARE EDUCATION/TRAINING PROGRAM

## 2024-07-25 RX ORDER — SODIUM CHLORIDE 0.9 % (FLUSH) 0.9 %
10 SYRINGE (ML) INJECTION EVERY 12 HOURS PRN
Status: DISCONTINUED | OUTPATIENT
Start: 2024-07-25 | End: 2024-07-28 | Stop reason: HOSPADM

## 2024-07-25 RX ORDER — HYDROCODONE BITARTRATE AND ACETAMINOPHEN 5; 325 MG/1; MG/1
1 TABLET ORAL EVERY 6 HOURS PRN
Status: DISCONTINUED | OUTPATIENT
Start: 2024-07-25 | End: 2024-07-28 | Stop reason: HOSPADM

## 2024-07-25 RX ORDER — IBUPROFEN 200 MG
24 TABLET ORAL
Status: DISCONTINUED | OUTPATIENT
Start: 2024-07-25 | End: 2024-07-28 | Stop reason: HOSPADM

## 2024-07-25 RX ORDER — ONDANSETRON HYDROCHLORIDE 2 MG/ML
4 INJECTION, SOLUTION INTRAVENOUS EVERY 8 HOURS PRN
Status: DISCONTINUED | OUTPATIENT
Start: 2024-07-25 | End: 2024-07-28 | Stop reason: HOSPADM

## 2024-07-25 RX ORDER — IBUPROFEN 200 MG
16 TABLET ORAL
Status: DISCONTINUED | OUTPATIENT
Start: 2024-07-25 | End: 2024-07-28 | Stop reason: HOSPADM

## 2024-07-25 RX ORDER — ACETAMINOPHEN 325 MG/1
650 TABLET ORAL EVERY 4 HOURS PRN
Status: DISCONTINUED | OUTPATIENT
Start: 2024-07-25 | End: 2024-07-28 | Stop reason: HOSPADM

## 2024-07-25 RX ORDER — HYDROCODONE BITARTRATE AND ACETAMINOPHEN 10; 325 MG/1; MG/1
1 TABLET ORAL EVERY 6 HOURS PRN
Status: DISCONTINUED | OUTPATIENT
Start: 2024-07-25 | End: 2024-07-28 | Stop reason: HOSPADM

## 2024-07-25 RX ORDER — SODIUM,POTASSIUM PHOSPHATES 280-250MG
2 POWDER IN PACKET (EA) ORAL
Status: DISCONTINUED | OUTPATIENT
Start: 2024-07-25 | End: 2024-07-28 | Stop reason: HOSPADM

## 2024-07-25 RX ORDER — LANOLIN ALCOHOL/MO/W.PET/CERES
800 CREAM (GRAM) TOPICAL
Status: DISCONTINUED | OUTPATIENT
Start: 2024-07-25 | End: 2024-07-28 | Stop reason: HOSPADM

## 2024-07-25 RX ORDER — MUPIROCIN 20 MG/G
OINTMENT TOPICAL 2 TIMES DAILY
Status: DISCONTINUED | OUTPATIENT
Start: 2024-07-25 | End: 2024-07-28 | Stop reason: HOSPADM

## 2024-07-25 RX ORDER — SODIUM CHLORIDE, SODIUM LACTATE, POTASSIUM CHLORIDE, CALCIUM CHLORIDE 600; 310; 30; 20 MG/100ML; MG/100ML; MG/100ML; MG/100ML
INJECTION, SOLUTION INTRAVENOUS CONTINUOUS
Status: DISCONTINUED | OUTPATIENT
Start: 2024-07-25 | End: 2024-07-28 | Stop reason: HOSPADM

## 2024-07-25 RX ORDER — GADOBUTROL 604.72 MG/ML
6 INJECTION INTRAVENOUS
Status: COMPLETED | OUTPATIENT
Start: 2024-07-25 | End: 2024-07-25

## 2024-07-25 RX ORDER — GLUCAGON 1 MG
1 KIT INJECTION
Status: DISCONTINUED | OUTPATIENT
Start: 2024-07-25 | End: 2024-07-28 | Stop reason: HOSPADM

## 2024-07-25 RX ORDER — PANTOPRAZOLE SODIUM 40 MG/1
40 TABLET, DELAYED RELEASE ORAL
Status: DISCONTINUED | OUTPATIENT
Start: 2024-07-26 | End: 2024-07-28 | Stop reason: HOSPADM

## 2024-07-25 RX ORDER — NALOXONE HCL 0.4 MG/ML
0.02 VIAL (ML) INJECTION
Status: DISCONTINUED | OUTPATIENT
Start: 2024-07-25 | End: 2024-07-28 | Stop reason: HOSPADM

## 2024-07-25 RX ADMIN — GADOBUTROL 6 ML: 604.72 INJECTION INTRAVENOUS at 09:07

## 2024-07-25 RX ADMIN — SODIUM CHLORIDE, POTASSIUM CHLORIDE, SODIUM LACTATE AND CALCIUM CHLORIDE: 600; 310; 30; 20 INJECTION, SOLUTION INTRAVENOUS at 08:07

## 2024-07-25 RX ADMIN — MUPIROCIN 1 G: 20 OINTMENT TOPICAL at 08:07

## 2024-07-25 RX ADMIN — PIPERACILLIN SODIUM AND TAZOBACTAM SODIUM 3.38 G: 3; .375 INJECTION, POWDER, LYOPHILIZED, FOR SOLUTION INTRAVENOUS at 08:07

## 2024-07-25 RX ADMIN — HYDROCODONE BITARTRATE AND ACETAMINOPHEN 1 TABLET: 10; 325 TABLET ORAL at 08:07

## 2024-07-25 NOTE — SUBJECTIVE & OBJECTIVE
Past Medical History:   Diagnosis Date    Abdominal pain     Cholangitis 6/30/2021    Diarrhea     IPMN (intraductal papillary mucinous neoplasm)     Nausea     Pancreas cyst     Pancreatic duct dilated     Pancreatitis        Past Surgical History:   Procedure Laterality Date    APPENDECTOMY      ENDOSCOPIC ULTRASOUND OF UPPER GASTROINTESTINAL TRACT N/A 10/3/2019    Procedure: ULTRASOUND, UPPER GI TRACT, ENDOSCOPIC;  Surgeon: Harsh Clark MD;  [facility]  Service: Endoscopy;  Laterality: N/A;    ESOPHAGOGASTRODUODENOSCOPY N/A 7/12/2021    Procedure: EGD (ESOPHAGOGASTRODUODENOSCOPY);  Surgeon: Mohsen Sosa MD;  [facility]  Service: Endoscopy;  Laterality: N/A;    INGUINAL HERNIA REPAIR      NISSEN FUNDOPLICATION      WHIPPLE PROCEDURE N/A 10/25/2019    Procedure: WHIPPLE PROCEDURE;  Surgeon: Paul King MD;  [facility];  Service: General;  Laterality: N/A;       Review of patient's allergies indicates:   Allergen Reactions    Codeine Other (See Comments)     headache    Aspirin Rash       No current facility-administered medications on file prior to encounter.     Current Outpatient Medications on File Prior to Encounter   Medication Sig    acetaminophen (TYLENOL) 325 MG tablet Take 650 mg by mouth. Pt taking PRN    amoxicillin (AMOXIL) 500 MG capsule Take by mouth.    COVID-19 vacc, mRNA,Pfizer,/PF (PFIZER COVID-19 VACCINE, EUA, IM) Inject 1 application into the muscle.    famotidine (PEPCID) 40 MG tablet Take 40 mg by mouth once daily.    flu vacc [id number] 6mos up,PF, (FLUARIX QUAD 1626-9329, PF,) 60 mcg (15 mcg x 4)/0.5 mL Syrg     indomethacin (INDOCIN) 25 MG capsule     Lactobacillus acidophilus 500 million cell Cap Take by mouth.    NEXIUM 40 mg capsule Take on empty stomach.Take or use exactly as directed.Obtain advice for OTCs.Swallow whole.    omeprazole (PRILOSEC) 40 MG capsule Take 40 mg by mouth 2 (two) times daily.    simvastatin (ZOCOR)  40 MG tablet Take 40 mg by mouth.    sucralfate (CARAFATE) 100 mg/mL suspension Take 1 g by mouth.    vit B comp-C-FA-iron-vit E 500 mg-400 mcg- 18 mg iron Tab Take 1 tablet by mouth.    vitamin D (VITAMIN D3) 1000 units Tab Take 1,000 Units by mouth.     Family History    None       Tobacco Use    Smoking status: Former     Current packs/day: 0.00     Average packs/day: 1 pack/day for 10.0 years (10.0 ttl pk-yrs)     Types: Cigarettes     Start date: 1989     Quit date: 1999     Years since quittin.5    Smokeless tobacco: Never   Substance and Sexual Activity    Alcohol use: Not Currently    Drug use: Never    Sexual activity: Not on file     Review of Systems   Constitutional:  Positive for appetite change and chills.   Respiratory:  Negative for cough and shortness of breath.    Cardiovascular:  Negative for chest pain and palpitations.   Gastrointestinal:  Positive for abdominal pain and nausea. Negative for abdominal distention.   Genitourinary:  Negative for difficulty urinating.   Musculoskeletal:  Negative for arthralgias and back pain.   Neurological:  Negative for dizziness.   Psychiatric/Behavioral:  Negative for agitation.      Objective:     Vital Signs (Most Recent):  Temp: 97.5 °F (36.4 °C) (24)  Pulse: 89 (24)  Resp: 18 (24)  BP: 127/82 (24)  SpO2: 96 % (24) Vital Signs (24h Range):  Temp:  [96.9 °F (36.1 °C)-99.5 °F (37.5 °C)] 97.5 °F (36.4 °C)  Pulse:  [] 89  Resp:  [18] 18  SpO2:  [94 %-96 %] 96 %  BP: ()/(56-82) 127/82        There is no height or weight on file to calculate BMI.     Physical Exam  Vitals reviewed.   Constitutional:       General: He is not in acute distress.  HENT:      Head: Atraumatic.   Cardiovascular:      Rate and Rhythm: Normal rate and regular rhythm.   Pulmonary:      Effort: No respiratory distress.      Breath sounds: No wheezing.   Abdominal:      General: There is no distension.       "Tenderness: There is abdominal tenderness.   Musculoskeletal:         General: No swelling.   Skin:     General: Skin is dry.   Neurological:      General: No focal deficit present.      Mental Status: He is alert.                Significant Labs: All pertinent labs within the past 24 hours have been reviewed.  CBC: No results for input(s): "WBC", "HGB", "HCT", "PLT" in the last 48 hours.  CMP: No results for input(s): "NA", "K", "CL", "CO2", "GLU", "BUN", "CREATININE", "CALCIUM", "PROT", "ALBUMIN", "BILITOT", "ALKPHOS", "AST", "ALT", "ANIONGAP", "EGFRNONAA" in the last 48 hours.    Invalid input(s): "ESTGFAFRICA"    Significant Imaging: I have reviewed all pertinent imaging results/findings within the past 24 hours.  "

## 2024-07-25 NOTE — H&P
[facility]  History & Physical    Patient Name: Jose Rafael Campbell  MRN: [id number]  Patient Class: IP- Inpatient  Admission Date: 7/25/2024  Attending Physician: Marquise Sampson MD   [facility]         Patient information was obtained from patient and ER records.     Subjective:     Principal Problem:Liver abscess    Chief Complaint: No chief complaint on file.       HPI: Patient is a 64-year-old male with a reported known past medical history of previous liver abscess and cysts (circa 2020) and s/p Whipple with Nissen fundoplication that presents the [facility] ER with 4 day history of nausea, diarrhea and abdominal pain. 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 Pt reports chills.   Per transfer note: Patient found to be tachycardic () and with low-grade fever (37.9C). Labs remarkable for leukocytosis with left shift (WBC 20.5K, 96.5% PMN). CTA of the abdomen showed colonic diverticulosis without evidence of acute diverticulitis and indeterminate 2.9 cm peripherally enhancing right hepatic lesion concerning for abscess. Sepsis protocol initiated and patient received 2L LR bolus and started on empiric IV antibiotics with cefepime and Flagyl. Transfer requested for higher level of care and access to general surgery.    Unable to review CT or labs. Surgery consulted. MRI abdomen ordered.     Past Medical History:   Diagnosis Date    Abdominal pain     Cholangitis 6/30/2021    Diarrhea     IPMN (intraductal papillary mucinous neoplasm)     Nausea     Pancreas cyst     Pancreatic duct dilated     Pancreatitis        Past Surgical History:   Procedure Laterality Date    APPENDECTOMY      ENDOSCOPIC ULTRASOUND OF UPPER GASTROINTESTINAL TRACT N/A 10/3/2019    Procedure: ULTRASOUND, UPPER GI TRACT, ENDOSCOPIC;  Surgeon: Harsh Clark MD;   [facility]  Service: Endoscopy;  Laterality: N/A;    ESOPHAGOGASTRODUODENOSCOPY N/A 7/12/2021    Procedure: EGD (ESOPHAGOGASTRODUODENOSCOPY);  Surgeon: Mohsen Sosa MD;  [facility]  Service: Endoscopy;  Laterality: N/A;    INGUINAL HERNIA REPAIR      NISSEN FUNDOPLICATION      WHIPPLE PROCEDURE N/A 10/25/2019    Procedure: WHIPPLE PROCEDURE;  Surgeon: Paul King MD;  Location: [address];  Service: General;  Laterality: N/A;       Review of patient's allergies indicates:   Allergen Reactions    Codeine Other (See Comments)     headache    Aspirin Rash       No current facility-administered medications on file prior to encounter.     Current Outpatient Medications on File Prior to Encounter   Medication Sig    acetaminophen (TYLENOL) 325 MG tablet Take 650 mg by mouth. Pt taking PRN    amoxicillin (AMOXIL) 500 MG capsule Take by mouth.    COVID-19 vacc, mRNA,Pfizer,/PF (PFIZER COVID-19 VACCINE, EUA, IM) Inject 1 application into the muscle.    famotidine (PEPCID) 40 MG tablet Take 40 mg by mouth once daily.    flu vacc [id number] 6mos up,PF, (FLUARIX QUAD [id number], PF,) 60 mcg (15 mcg x 4)/0.5 mL Syrg     indomethacin (INDOCIN) 25 MG capsule     Lactobacillus acidophilus 500 million cell Cap Take by mouth.    NEXIUM 40 mg capsule Take on empty stomach.Take or use exactly as directed.Obtain advice for OTCs.Swallow whole.    omeprazole (PRILOSEC) 40 MG capsule Take 40 mg by mouth 2 (two) times daily.    simvastatin (ZOCOR) 40 MG tablet Take 40 mg by mouth.    sucralfate (CARAFATE) 100 mg/mL suspension Take 1 g by mouth.    vit B comp-C-FA-iron-vit E 500 mg-400 mcg- 18 mg iron Tab Take 1 tablet by mouth.    vitamin D (VITAMIN D3) 1000 units Tab Take 1,000 Units by mouth.     Family History    None       Tobacco Use    Smoking status: Former     Current packs/day: 0.00     Average packs/day: 1 pack/day for 10.0 years (10.0 ttl pk-yrs)     Types: Cigarettes     Start date:  "1989     Quit date: 1999     Years since quittin.5    Smokeless tobacco: Never   Substance and Sexual Activity    Alcohol use: Not Currently    Drug use: Never    Sexual activity: Not on file     Review of Systems   Constitutional:  Positive for appetite change and chills.   Respiratory:  Negative for cough and shortness of breath.    Cardiovascular:  Negative for chest pain and palpitations.   Gastrointestinal:  Positive for abdominal pain and nausea. Negative for abdominal distention.   Genitourinary:  Negative for difficulty urinating.   Musculoskeletal:  Negative for arthralgias and back pain.   Neurological:  Negative for dizziness.   Psychiatric/Behavioral:  Negative for agitation.      Objective:     Vital Signs (Most Recent):  Temp: 97.5 °F (36.4 °C) (24)  Pulse: 89 (24)  Resp: 18 (24)  BP: 127/82 (24)  SpO2: 96 % (24) Vital Signs (24h Range):  Temp:  [96.9 °F (36.1 °C)-99.5 °F (37.5 °C)] 97.5 °F (36.4 °C)  Pulse:  [] 89  Resp:  [18] 18  SpO2:  [94 %-96 %] 96 %  BP: ()/(56-82) 127/82        There is no height or weight on file to calculate BMI.     Physical Exam  Vitals reviewed.   Constitutional:       General: He is not in acute distress.  HENT:      Head: Atraumatic.   Cardiovascular:      Rate and Rhythm: Normal rate and regular rhythm.   Pulmonary:      Effort: No respiratory distress.      Breath sounds: No wheezing.   Abdominal:      General: There is no distension.      Tenderness: There is abdominal tenderness.   Musculoskeletal:         General: No swelling.   Skin:     General: Skin is dry.   Neurological:      General: No focal deficit present.      Mental Status: He is alert.                Significant Labs: All pertinent labs within the past 24 hours have been reviewed.  CBC: No results for input(s): "WBC", "HGB", "HCT", "PLT" in the last 48 hours.  CMP: No results for input(s): "NA", "K", "CL", "CO2", "GLU", " ""BUN", "CREATININE", "CALCIUM", "PROT", "ALBUMIN", "BILITOT", "ALKPHOS", "AST", "ALT", "ANIONGAP", "EGFRNONAA" in the last 48 hours.    Invalid input(s): "ESTGFAFRICA"    Significant Imaging: I have reviewed all pertinent imaging results/findings within the past 24 hours.  Assessment/Plan:     * Liver abscess  F/u CBC and CMP  MRI abdomen w/wo contrast   Surgery consulted   F/u lactic  Started on zosyn   F/u blood cx, ua  Npo at midnight till surgery eval   IVFs         VTE Risk Mitigation (From admission, onward)           Ordered     IP VTE LOW RISK PATIENT  Once         07/25/24 1836     Place sequential compression device  Until discontinued         07/25/24 1836                                    Stephen Rand MD  Department of Hospital Medicine  [facility]          "

## 2024-07-25 NOTE — PROVIDER TRANSFER
Outside Transfer Acceptance Note / Regional Referral Center    Referring facility: [facility]  Referring provider: NANCY HARDEN, PROVIDER  Accepting facility: [facility]  Accepting provider: KENISHA TUCKER  Admitting provider: SHONDA MURRELL  Reason for transfer: Higher Level of Care   Transfer diagnosis: Liver Abscess (K75.0)  Transfer specialty requested: General Surgery  Transfer specialty notified: No  Transfer level: NUMBER 1-5: 2  Bed type requested: TELEMETRY  Isolation status: No active isolations   Admission class or status: IP- Inpatient  IP- Inpatient      Narrative   Patient is a 64-year-old male with a known past medical history of previous liver abscess (circa 2020) and s/p Whipple with Nissen fundoplication that presents the ER with 2 day history of nausea, diarrhea and abdominal pain.  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  When symptoms did not improve following rest, he decided to come to the ER for further evaluation.  Patient denies any measured fevers but does report some subjective chills.  On arrival to ER, patient in no acute distress.  Patient found to be tachycardic () and with low-grade fever (37.9C).  Labs remarkable for leukocytosis with left shift (WBC 20.5K, 96.5% PMN).  CTA of the abdomen showed colonic diverticulosis without evidence of acute diverticulitis and indeterminate 2.9 cm peripherally enhancing right hepatic lesion concerning for abscess.  Sepsis protocol initiated and patient received 2L LR bolus and started on empiric IV antibiotics with cefepime and Flagyl.  Transfer requested for higher level of care and access to general surgery.  Patient to be transferred to [facility] for higher level care via EMS.    Objective     Vitals:    Temp: 37.9C, HR: 112, BP: 136/73, RR: 20, O2 Sats: 96% on RA  Recent Labs:   WBC  20.5, 96.5% PMN, Hgb 12.0, Plt 305K  Na 136, K 3.8, Cl 105.0, HCO3 24, BUN 26.0, sCr 0.9, Glucose 104, T. bili 0.7, Lipase 21, Alk Phos 79, ALT 27, AST 25    Recent imaging:   CT Abd:  Colonic diverticulosis without evidence of acute diverticulitis.  Indeterminate 2.9 cm peripherally enhancing right hepatic lesion.     Infusions: None  Allergies:   Review of patient's allergies indicates:   Allergen Reactions    Codeine Other (See Comments)     headache    Aspirin Rash      NPO: No    Anticoagulation:   Anticoagulants       None             Instructions      [facility]  Admit to Hospital Medicine  Upon patient arrival to floor, please contact Hospital Medicine on call.

## 2024-07-25 NOTE — ASSESSMENT & PLAN NOTE
F/u CBC and CMP  MRI abdomen w/wo contrast   Surgery consulted   F/u lactic  Started on zosyn   F/u blood cx, ua  Npo at midnight till surgery eval   IVFs

## 2024-07-25 NOTE — HPI
Patient is a 64-year-old male with a reported known past medical history of previous liver abscess and cysts (circa 2020) and s/p Whipple with Nissen fundoplication that presents the [facility] ER with 4 day history of nausea, diarrhea and abdominal pain. 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 Pt reports chills.   Per transfer note: Patient found to be tachycardic () and with low-grade fever (37.9C). Labs remarkable for leukocytosis with left shift (WBC 20.5K, 96.5% PMN). CTA of the abdomen showed colonic diverticulosis without evidence of acute diverticulitis and indeterminate 2.9 cm peripherally enhancing right hepatic lesion concerning for abscess. Sepsis protocol initiated and patient received 2L LR bolus and started on empiric IV antibiotics with cefepime and Flagyl. Transfer requested for higher level of care and access to general surgery.    Unable to review CT or labs. Surgery consulted. MRI abdomen ordered.

## 2024-07-26 ENCOUNTER — TELEPHONE (OUTPATIENT)
Dept: SURGERY | Facility: CLINIC | Age: 64
End: 2024-07-26
Payer: MEDICARE

## 2024-07-26 PROBLEM — Z90.410 H/O WHIPPLE PROCEDURE: Status: ACTIVE | Noted: 2024-07-26

## 2024-07-26 PROBLEM — Z86.19 HISTORY OF CLOSTRIDIOIDES DIFFICILE COLITIS: Status: ACTIVE | Noted: 2024-07-26

## 2024-07-26 PROBLEM — Z90.49 H/O WHIPPLE PROCEDURE: Status: ACTIVE | Noted: 2024-07-26

## 2024-07-26 LAB
ALBUMIN SERPL BCP-MCNC: 3.4 G/DL (ref 3.5–5.2)
ALP SERPL-CCNC: 58 U/L (ref 55–135)
ALT SERPL W/O P-5'-P-CCNC: 22 U/L (ref 10–44)
ANION GAP SERPL CALC-SCNC: 11 MMOL/L (ref 8–16)
AST SERPL-CCNC: 26 U/L (ref 10–40)
BASOPHILS # BLD AUTO: 0.03 K/UL (ref 0–0.2)
BASOPHILS NFR BLD: 0.4 % (ref 0–1.9)
BILIRUB SERPL-MCNC: 0.4 MG/DL (ref 0.1–1)
BILIRUB UR QL STRIP: NEGATIVE
BUN SERPL-MCNC: 15 MG/DL (ref 8–23)
CALCIUM SERPL-MCNC: 8.3 MG/DL (ref 8.7–10.5)
CHLORIDE SERPL-SCNC: 103 MMOL/L (ref 95–110)
CLARITY UR: CLEAR
CO2 SERPL-SCNC: 23 MMOL/L (ref 23–29)
COLOR UR: YELLOW
CREAT SERPL-MCNC: 0.7 MG/DL (ref 0.5–1.4)
DIFFERENTIAL METHOD BLD: ABNORMAL
EOSINOPHIL # BLD AUTO: 0.1 K/UL (ref 0–0.5)
EOSINOPHIL NFR BLD: 1.5 % (ref 0–8)
ERYTHROCYTE [DISTWIDTH] IN BLOOD BY AUTOMATED COUNT: 13.9 % (ref 11.5–14.5)
EST. GFR  (NO RACE VARIABLE): >60 ML/MIN/1.73 M^2
GLUCOSE SERPL-MCNC: 84 MG/DL (ref 70–110)
GLUCOSE UR QL STRIP: NEGATIVE
HCT VFR BLD AUTO: 36.1 % (ref 40–54)
HGB BLD-MCNC: 11.9 G/DL (ref 14–18)
HGB UR QL STRIP: NEGATIVE
IMM GRANULOCYTES # BLD AUTO: 0.07 K/UL (ref 0–0.04)
IMM GRANULOCYTES NFR BLD AUTO: 0.9 % (ref 0–0.5)
KETONES UR QL STRIP: ABNORMAL
LEUKOCYTE ESTERASE UR QL STRIP: NEGATIVE
LYMPHOCYTES # BLD AUTO: 0.6 K/UL (ref 1–4.8)
LYMPHOCYTES NFR BLD: 7.9 % (ref 18–48)
MAGNESIUM SERPL-MCNC: 1.9 MG/DL (ref 1.6–2.6)
MCH RBC QN AUTO: 30.5 PG (ref 27–31)
MCHC RBC AUTO-ENTMCNC: 33 G/DL (ref 32–36)
MCV RBC AUTO: 93 FL (ref 82–98)
MONOCYTES # BLD AUTO: 0.9 K/UL (ref 0.3–1)
MONOCYTES NFR BLD: 12.1 % (ref 4–15)
NEUTROPHILS # BLD AUTO: 6 K/UL (ref 1.8–7.7)
NEUTROPHILS NFR BLD: 77.2 % (ref 38–73)
NITRITE UR QL STRIP: NEGATIVE
NRBC BLD-RTO: 0 /100 WBC
PH UR STRIP: 6 [PH] (ref 5–8)
PLATELET # BLD AUTO: 204 K/UL (ref 150–450)
PMV BLD AUTO: 9.4 FL (ref 9.2–12.9)
POTASSIUM SERPL-SCNC: 4 MMOL/L (ref 3.5–5.1)
PROT SERPL-MCNC: 6.2 G/DL (ref 6–8.4)
PROT UR QL STRIP: NEGATIVE
RBC # BLD AUTO: 3.9 M/UL (ref 4.6–6.2)
SODIUM SERPL-SCNC: 137 MMOL/L (ref 136–145)
SP GR UR STRIP: 1.02 (ref 1–1.03)
URN SPEC COLLECT METH UR: ABNORMAL
UROBILINOGEN UR STRIP-ACNC: NEGATIVE EU/DL
WBC # BLD AUTO: 7.76 K/UL (ref 3.9–12.7)

## 2024-07-26 PROCEDURE — 83735 ASSAY OF MAGNESIUM: CPT | Performed by: STUDENT IN AN ORGANIZED HEALTH CARE EDUCATION/TRAINING PROGRAM

## 2024-07-26 PROCEDURE — 80053 COMPREHEN METABOLIC PANEL: CPT | Performed by: STUDENT IN AN ORGANIZED HEALTH CARE EDUCATION/TRAINING PROGRAM

## 2024-07-26 PROCEDURE — 36415 COLL VENOUS BLD VENIPUNCTURE: CPT | Performed by: STUDENT IN AN ORGANIZED HEALTH CARE EDUCATION/TRAINING PROGRAM

## 2024-07-26 PROCEDURE — 81003 URINALYSIS AUTO W/O SCOPE: CPT | Performed by: STUDENT IN AN ORGANIZED HEALTH CARE EDUCATION/TRAINING PROGRAM

## 2024-07-26 PROCEDURE — 25000003 PHARM REV CODE 250: Performed by: HOSPITALIST

## 2024-07-26 PROCEDURE — 63600175 PHARM REV CODE 636 W HCPCS: Performed by: STUDENT IN AN ORGANIZED HEALTH CARE EDUCATION/TRAINING PROGRAM

## 2024-07-26 PROCEDURE — 85025 COMPLETE CBC W/AUTO DIFF WBC: CPT | Performed by: STUDENT IN AN ORGANIZED HEALTH CARE EDUCATION/TRAINING PROGRAM

## 2024-07-26 PROCEDURE — 12000002 HC ACUTE/MED SURGE SEMI-PRIVATE ROOM

## 2024-07-26 PROCEDURE — 99223 1ST HOSP IP/OBS HIGH 75: CPT | Mod: ,,, | Performed by: SURGERY

## 2024-07-26 PROCEDURE — 25000003 PHARM REV CODE 250: Performed by: STUDENT IN AN ORGANIZED HEALTH CARE EDUCATION/TRAINING PROGRAM

## 2024-07-26 RX ORDER — L. ACIDOPHILUS/L.BULGARICUS 100MM CELL
1 GRANULES IN PACKET (EA) ORAL 3 TIMES DAILY
Status: DISCONTINUED | OUTPATIENT
Start: 2024-07-26 | End: 2024-07-26

## 2024-07-26 RX ORDER — ATORVASTATIN CALCIUM 40 MG/1
40 TABLET, FILM COATED ORAL NIGHTLY
Status: DISCONTINUED | OUTPATIENT
Start: 2024-07-26 | End: 2024-07-28 | Stop reason: HOSPADM

## 2024-07-26 RX ORDER — CHOLECALCIFEROL (VITAMIN D3) 25 MCG
1000 TABLET ORAL DAILY
Status: DISCONTINUED | OUTPATIENT
Start: 2024-07-26 | End: 2024-07-28 | Stop reason: HOSPADM

## 2024-07-26 RX ORDER — SUCRALFATE 1 G/10ML
1 SUSPENSION ORAL
Status: DISCONTINUED | OUTPATIENT
Start: 2024-07-26 | End: 2024-07-28 | Stop reason: HOSPADM

## 2024-07-26 RX ADMIN — PIPERACILLIN SODIUM AND TAZOBACTAM SODIUM 3.38 G: 3; .375 INJECTION, POWDER, LYOPHILIZED, FOR SOLUTION INTRAVENOUS at 11:07

## 2024-07-26 RX ADMIN — HYDROCODONE BITARTRATE AND ACETAMINOPHEN 1 TABLET: 10; 325 TABLET ORAL at 08:07

## 2024-07-26 RX ADMIN — VANCOMYCIN HYDROCHLORIDE 125 MG: KIT at 12:07

## 2024-07-26 RX ADMIN — Medication 4 TABLET: at 11:07

## 2024-07-26 RX ADMIN — Medication 1000 UNITS: at 04:07

## 2024-07-26 RX ADMIN — SUCRALFATE 1 G: 1 SUSPENSION ORAL at 04:07

## 2024-07-26 RX ADMIN — MUPIROCIN 1 G: 20 OINTMENT TOPICAL at 11:07

## 2024-07-26 RX ADMIN — SUCRALFATE 1 G: 1 SUSPENSION ORAL at 08:07

## 2024-07-26 RX ADMIN — PIPERACILLIN SODIUM AND TAZOBACTAM SODIUM 3.38 G: 3; .375 INJECTION, POWDER, LYOPHILIZED, FOR SOLUTION INTRAVENOUS at 08:07

## 2024-07-26 RX ADMIN — SODIUM CHLORIDE, POTASSIUM CHLORIDE, SODIUM LACTATE AND CALCIUM CHLORIDE: 600; 310; 30; 20 INJECTION, SOLUTION INTRAVENOUS at 08:07

## 2024-07-26 RX ADMIN — MUPIROCIN 1 G: 20 OINTMENT TOPICAL at 08:07

## 2024-07-26 RX ADMIN — ATORVASTATIN CALCIUM 40 MG: 40 TABLET, FILM COATED ORAL at 08:07

## 2024-07-26 RX ADMIN — PANTOPRAZOLE SODIUM 40 MG: 40 TABLET, DELAYED RELEASE ORAL at 05:07

## 2024-07-26 RX ADMIN — HYDROCODONE BITARTRATE AND ACETAMINOPHEN 1 TABLET: 10; 325 TABLET ORAL at 05:07

## 2024-07-26 RX ADMIN — PIPERACILLIN SODIUM AND TAZOBACTAM SODIUM 3.38 G: 3; .375 INJECTION, POWDER, LYOPHILIZED, FOR SOLUTION INTRAVENOUS at 03:07

## 2024-07-26 RX ADMIN — HYDROCODONE BITARTRATE AND ACETAMINOPHEN 1 TABLET: 10; 325 TABLET ORAL at 01:07

## 2024-07-26 NOTE — NURSING
Patient and spouse requesting to speak to a  before signing medicare and  forms. Social work consult placed.

## 2024-07-26 NOTE — NURSING
Pt asking for home medications and states that he has not taken since 7/23 prior to visitng the ED. Will pass along to day shift nurse. Home medications were reviewed.

## 2024-07-26 NOTE — HOSPITAL COURSE
64-year-old male with history of IPMN status post whipple around 2 years ago, previous liver abscess status post drainage around 1 and half years ago, status post Nissen fundoplication  presenting here for recurrent liver abscess as a transfer from [facility]  Patient's previous surgeon was Dr. Paul Macias at [facility] with number [phone].  Started on IV Zosyn.  General surgery was consulted.  Patient also has a history of C diff x2, started on probiotic and low-dose vancomycin for C diff prevention. Stool for c diff was ordered. Dr. Macias reviewed MRI and did not feel patient needed transfer to [facility] at this time. Patient will need f/u outpatient with Dr. Macias. Patient was continued on abx until final culture results were obtained. Cultures were negative. Patient discharged home in stable condition.

## 2024-07-26 NOTE — SUBJECTIVE & OBJECTIVE
Interval History:   Seen and examined at multidisciplinary rounds, complaining of intermittent right upper quadrant abdominal pain associated with fever and chills, also complaining of nausea vomiting, no diarrhea, no recent travel history.  Outside record workup shows liver abscess.  On IV Zosyn.  Pending our surgeon evaluation, may need transferred to [facility] for Dr. King ( patient's primary surgery)  evaluation.     Review of Systems   Constitutional:  Positive for chills, fatigue and fever. Negative for activity change.   HENT:  Negative for ear discharge, facial swelling and sore throat.    Eyes:  Negative for photophobia, pain and visual disturbance.   Respiratory:  Negative for apnea, cough and shortness of breath.    Cardiovascular:  Negative for chest pain and leg swelling.   Gastrointestinal:  Positive for abdominal pain and nausea. Negative for blood in stool.   Endocrine: Negative for cold intolerance and heat intolerance.   Musculoskeletal:  Negative for back pain and gait problem.   Skin:  Negative for pallor and rash.   Neurological:  Negative for speech difficulty and headaches.   Psychiatric/Behavioral:  Negative for confusion, hallucinations and suicidal ideas.    All other systems reviewed and are negative.    Objective:     Vital Signs (Most Recent):  Temp: 97.8 °F (36.6 °C) (07/26/24 1131)  Pulse: 85 (07/26/24 1131)  Resp: 18 (07/26/24 1131)  BP: 111/64 (07/26/24 1131)  SpO2: 95 % (07/26/24 1131) Vital Signs (24h Range):  Temp:  [97.5 °F (36.4 °C)-99 °F (37.2 °C)] 97.8 °F (36.6 °C)  Pulse:  [] 85  Resp:  [16-22] 18  SpO2:  [94 %-98 %] 95 %  BP: (110-142)/(58-87) 111/64     Weight: 64.2 kg (141 lb 8.6 oz)  Body mass index is 23.55 kg/m².    Intake/Output Summary (Last 24 hours) at 7/26/2024 1145  Last data filed at 7/26/2024 0843  Gross per 24 hour   Intake 240 ml   Output 700 ml   Net -460 ml         Physical Exam  Vitals and nursing note reviewed.   Constitutional:        General: He is not in acute distress.     Appearance: He is not diaphoretic.   HENT:      Head: Normocephalic.   Eyes:      General: No scleral icterus.        Right eye: No discharge.         Left eye: No discharge.      Conjunctiva/sclera: Conjunctivae normal.   Neck:      Vascular: No JVD.   Cardiovascular:      Rate and Rhythm: Normal rate and regular rhythm.      Heart sounds: Normal heart sounds. No murmur heard.     No friction rub.   Pulmonary:      Effort: Pulmonary effort is normal. No respiratory distress.      Breath sounds: Normal breath sounds. No wheezing.   Abdominal:      General: Bowel sounds are normal. There is no distension.      Palpations: Abdomen is soft.      Tenderness: There is abdominal tenderness.      Comments: Right upper quadrant tenderness   Musculoskeletal:         General: Normal range of motion.   Lymphadenopathy:      Cervical: No cervical adenopathy.   Skin:     Findings: No erythema or rash.   Neurological:      Mental Status: He is alert and oriented to person, place, and time.      Deep Tendon Reflexes: Reflexes are normal and symmetric.   Psychiatric:         Behavior: Behavior normal.             Significant Labs: All pertinent labs within the past 24 hours have been reviewed.  CBC:   Recent Labs   Lab 07/25/24 1929 07/26/24  0528   WBC 9.78 7.76   HGB 12.2* 11.9*   HCT 38.4* 36.1*    204     CMP:   Recent Labs   Lab 07/25/24 1929 07/26/24  0528   * 137   K 4.3 4.0    103   CO2 19* 23   GLU 63* 84   BUN 17 15   CREATININE 0.7 0.7   CALCIUM 8.6* 8.3*   PROT 6.4 6.2   ALBUMIN 3.7 3.4*   BILITOT 0.4 0.4   ALKPHOS 59 58   AST 28 26   ALT 22 22   ANIONGAP 12 11       Significant Imaging: I have reviewed all pertinent imaging results/findings within the past 24 hours.  I have reviewed and interpreted all pertinent imaging results/findings within the past 24 hours.    No results found.- pulls last radiology orders

## 2024-07-26 NOTE — PLAN OF CARE
[facility]  Initial Discharge Assessment       Primary Care Provider: [facility]    Admission Diagnosis: Liver abscess [K75.0]    Admission Date: 7/25/2024  Expected Discharge Date: 7/30/2024    Transition of Care Barriers: (P) None    Payor: MEDICARE / Plan: MEDICARE PART A & B / Product Type: Government /     Extended Emergency Contact Information  Primary Emergency Contact: Tiffany Campbell  Address: [address] [place] [address] [facility]  Mobile Phone: [phone]  Relation: Spouse  Preferred language: English   needed? No    Discharge Plan A: (P) Home  Discharge Plan B: (P) Home with family      [facility] [place] - [address]  [address]  [address]  [facility] [facility] [place] [address]  Phone: [phone] Fax: [phone]    [facility] [address] [address]  Phone: [phone] Fax: [phone]    [id number] - [place], [place] - [address]  [address]  [place] [address]  Phone: [phone] Fax: [phone]    DC assessment completed with patient at bedside. Verified information on facesheet as correct. Pt lives at listed address with spouse Tiffany. Reports he has help if needed from Tiffany. Reports POA as Tiffany Campbell  PCP is located at [facility]. Pharmacy is [facility]. Denies hh/hd/outpt services. DME only a bp cuff is currently used. Reports being independent with activities. Drives himself to apts. Reports Tiffany will provide transportation home upon DC. Reports taking home medications as prescribed and can currently afford them. Verified insurance on file. Denies recent inpt stay in last 30 days.  DC plan is home with family.    Initial Assessment (most recent)       Adult Discharge Assessment - 07/26/24 1534          Discharge Assessment    Assessment Type Discharge Planning Assessment (P)       Confirmed/corrected address, phone number and insurance Yes (P)      Confirmed Demographics Correct on Facesheet (P)      Source of Information patient (P)      When was your last doctors appointment? 07/01/24 (P)      People in Home spouse (P)      Facility Arrived From: [facility] (P)      Do you expect to return to your current living situation? Yes (P)      Do you have help at home or someone to help you manage your care at home? Yes (P)      Who are your caregiver(s) and their phone number(s)? Spouse- Tiffany [phone] (P)      Prior to hospitilization cognitive status: Alert/Oriented (P)      Current cognitive status: Alert/Oriented (P)      Walking or Climbing Stairs Difficulty no (P)      Dressing/Bathing Difficulty no (P)      Equipment Currently Used at Home blood pressure machine (P)      Readmission within 30 days? No (P)      Patient currently being followed by outpatient case management? No (P)      Do you currently have service(s) that help you manage your care at home? No (P)      Do you take prescription medications? Yes (P)      Do you have prescription coverage? Yes (P)      Coverage  (P)      Do you have any problems affording any of your prescribed medications? No (P)      Is the patient taking medications as prescribed? yes (P)      How do you get to doctors appointments? family or friend will provide (P)      Are you on dialysis? No (P)      Do you take coumadin? No (P)      Discharge Plan A Home (P)      Discharge Plan B Home with family (P)      DME Needed Upon Discharge  none (P)      Discharge Plan discussed with: Spouse/sig other;Patient (P)      Transition of Care Barriers None (P)

## 2024-07-26 NOTE — PROGRESS NOTES
[facility] Medicine  Progress Note    Patient Name: Jose Rafael Campbell  MRN: [id number]  Patient Class: IP- Inpatient   Admission Date: 7/25/2024  Length of Stay: 1 days  Attending Physician: Angel Finnegan MD  Primary Care Provider: [facility]        Subjective:     Principal Problem:Liver abscess        HPI:  Patient is a 64-year-old male with a reported known past medical history of previous liver abscess and cysts (circa 2020) and s/p Whipple with Nissen fundoplication that presents the [facility] ER with 4 day history of nausea, diarrhea and abdominal pain. 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 Pt reports chills.   Per transfer note: Patient found to be tachycardic () and with low-grade fever (37.9C). Labs remarkable for leukocytosis with left shift (WBC 20.5K, 96.5% PMN). CTA of the abdomen showed colonic diverticulosis without evidence of acute diverticulitis and indeterminate 2.9 cm peripherally enhancing right hepatic lesion concerning for abscess. Sepsis protocol initiated and patient received 2L LR bolus and started on empiric IV antibiotics with cefepime and Flagyl. Transfer requested for higher level of care and access to general surgery.    Unable to review CT or labs. Surgery consulted. MRI abdomen ordered.     Overview/Hospital Course:  64-year-old male with history of IPMN status post whipple around 2 years ago, previous liver abscess status post drainage around 1 and half years ago, status post Nissen fundoplication  presenting here for recurrent liver abscess as a transfer from [facility].  Patient's previous surgeon was Dr. Paul Macias at [facility] with number [phone].  Started on IV Zosyn.  General surgery was consulted.  Patient also has a history of C diff x2, started on probiotic and low-dose vancomycin for C diff  prevention.     Interval History:   Seen and examined at multidisciplinary rounds, complaining of intermittent right upper quadrant abdominal pain associated with fever and chills, also complaining of nausea vomiting, no diarrhea, no recent travel history.  Outside record workup shows liver abscess.  On IV Zosyn.  Pending our surgeon evaluation, may need transferred to [facility] for Dr. King ( patient's primary surgery)  evaluation.     Review of Systems   Constitutional:  Positive for chills, fatigue and fever. Negative for activity change.   HENT:  Negative for ear discharge, facial swelling and sore throat.    Eyes:  Negative for photophobia, pain and visual disturbance.   Respiratory:  Negative for apnea, cough and shortness of breath.    Cardiovascular:  Negative for chest pain and leg swelling.   Gastrointestinal:  Positive for abdominal pain and nausea. Negative for blood in stool.   Endocrine: Negative for cold intolerance and heat intolerance.   Musculoskeletal:  Negative for back pain and gait problem.   Skin:  Negative for pallor and rash.   Neurological:  Negative for speech difficulty and headaches.   Psychiatric/Behavioral:  Negative for confusion, hallucinations and suicidal ideas.    All other systems reviewed and are negative.    Objective:     Vital Signs (Most Recent):  Temp: 97.8 °F (36.6 °C) (07/26/24 1131)  Pulse: 85 (07/26/24 1131)  Resp: 18 (07/26/24 1131)  BP: 111/64 (07/26/24 1131)  SpO2: 95 % (07/26/24 1131) Vital Signs (24h Range):  Temp:  [97.5 °F (36.4 °C)-99 °F (37.2 °C)] 97.8 °F (36.6 °C)  Pulse:  [] 85  Resp:  [16-22] 18  SpO2:  [94 %-98 %] 95 %  BP: (110-142)/(58-87) 111/64     Weight: 64.2 kg (141 lb 8.6 oz)  Body mass index is 23.55 kg/m².    Intake/Output Summary (Last 24 hours) at 7/26/2024 1145  Last data filed at 7/26/2024 0843  Gross per 24 hour   Intake 240 ml   Output 700 ml   Net -460 ml         Physical Exam  Vitals and nursing note reviewed.    Constitutional:       General: He is not in acute distress.     Appearance: He is not diaphoretic.   HENT:      Head: Normocephalic.   Eyes:      General: No scleral icterus.        Right eye: No discharge.         Left eye: No discharge.      Conjunctiva/sclera: Conjunctivae normal.   Neck:      Vascular: No JVD.   Cardiovascular:      Rate and Rhythm: Normal rate and regular rhythm.      Heart sounds: Normal heart sounds. No murmur heard.     No friction rub.   Pulmonary:      Effort: Pulmonary effort is normal. No respiratory distress.      Breath sounds: Normal breath sounds. No wheezing.   Abdominal:      General: Bowel sounds are normal. There is no distension.      Palpations: Abdomen is soft.      Tenderness: There is abdominal tenderness.      Comments: Right upper quadrant tenderness   Musculoskeletal:         General: Normal range of motion.   Lymphadenopathy:      Cervical: No cervical adenopathy.   Skin:     Findings: No erythema or rash.   Neurological:      Mental Status: He is alert and oriented to person, place, and time.      Deep Tendon Reflexes: Reflexes are normal and symmetric.   Psychiatric:         Behavior: Behavior normal.             Significant Labs: All pertinent labs within the past 24 hours have been reviewed.  CBC:   Recent Labs   Lab 07/25/24 1929 07/26/24  0528   WBC 9.78 7.76   HGB 12.2* 11.9*   HCT 38.4* 36.1*    204     CMP:   Recent Labs   Lab 07/25/24 1929 07/26/24  0528   * 137   K 4.3 4.0    103   CO2 19* 23   GLU 63* 84   BUN 17 15   CREATININE 0.7 0.7   CALCIUM 8.6* 8.3*   PROT 6.4 6.2   ALBUMIN 3.7 3.4*   BILITOT 0.4 0.4   ALKPHOS 59 58   AST 28 26   ALT 22 22   ANIONGAP 12 11       Significant Imaging: I have reviewed all pertinent imaging results/findings within the past 24 hours.  I have reviewed and interpreted all pertinent imaging results/findings within the past 24 hours.    No results found.- pulls last radiology orders      Assessment/Plan:       * Liver abscess  F/u CBC and CMP  MRI abdomen w/wo contrast   Surgery consulted   F/u lactic  Started on zosyn   F/u blood cx, ua  Npo at midnight till surgery eval   IVFs   Started on probiotic and p.o. vancomycin dose for C diff prevention      History of Clostridioides difficile colitis x2    Started on low-dose p.o. vancomycin and probiotics for C diff prevention    H/O Whipple procedure 2/2 IPMN by Dr Macias at [facility]  Aware may need to transfer to [facility] for Dr. King evaluation        VTE Risk Mitigation (From admission, onward)           Ordered     IP VTE LOW RISK PATIENT  Once         07/25/24 1836     Place sequential compression device  Until discontinued         07/25/24 1836                    Discharge Planning   ELAINE: 7/30/2024     Code Status: Full Code   Is the patient medically ready for discharge?:     Reason for patient still in hospital (select all that apply): Patient trending condition and Treatment                     Angel Finnegan MD  Department of Hospital Medicine   [facility]

## 2024-07-26 NOTE — PLAN OF CARE
Problem: Infection  Goal: Absence of Infection Signs and Symptoms  Outcome: Progressing     Problem: Adult Inpatient Plan of Care  Goal: Plan of Care Review  Outcome: Progressing  Goal: Patient-Specific Goal (Individualized)  Outcome: Progressing  Goal: Absence of Hospital-Acquired Illness or Injury  Outcome: Progressing  Goal: Optimal Comfort and Wellbeing  Outcome: Progressing  Goal: Readiness for Transition of Care  Outcome: Progressing     Problem: Fall Injury Risk  Goal: Absence of Fall and Fall-Related Injury  Outcome: Progressing     Problem: Pain Acute  Goal: Optimal Pain Control and Function  Outcome: Progressing

## 2024-07-26 NOTE — PLAN OF CARE
Problem: Adult Inpatient Plan of Care  Goal: Plan of Care Review  7/26/2024 0252 by Maryellen Schroeder, RN  Outcome: Progressing  7/26/2024 0252 by Maryellen Schroeder RN  Outcome: Progressing  Goal: Optimal Comfort and Wellbeing  7/26/2024 0252 by Maryellen Schroeder, RN  Outcome: Progressing  7/26/2024 0252 by Maryellen Schroeder, RN  Outcome: Progressing     Problem: Fall Injury Risk  Goal: Absence of Fall and Fall-Related Injury  Outcome: Progressing     Problem: Pain Acute  Goal: Optimal Pain Control and Function  Outcome: Progressing

## 2024-07-26 NOTE — PROGRESS NOTES
"Consult for liver abscess received. Per note outside CT scan showed an "indeterminate 2.9 cm peripherally enhancing right hepatic lesion." Per previous MRCP from 01/10/2023 there is an "unchanged subcapsular right posterior lobe hemangioma measuring 2.6 cm."  Repeat MRI pending but upon my review I do not see any new lesions and suspect the concerning liver abscess seen on CT scan correlates with the known subcapsular right posterior lobe lesion.  I am unable however to review the outside CT scan.  Will await official report from recent MRI.  "

## 2024-07-26 NOTE — ASSESSMENT & PLAN NOTE
F/u CBC and CMP  MRI abdomen w/wo contrast   Surgery consulted   F/u lactic  Started on zosyn   F/u blood cx, ua  Npo at midnight till surgery eval   IVFs   Started on probiotic and p.o. vancomycin dose for C diff prevention

## 2024-07-26 NOTE — NURSING
Nurses Note -- 4 Eyes      7/25/2024   9:53 PM      Skin assessed during: Admit      [x] No Altered Skin Integrity Present    [x]Prevention Measures Documented      [] Yes- Altered Skin Integrity Present or Discovered   [] LDA Added if Not in Epic (Describe Wound)   [] New Altered Skin Integrity was Present on Admit and Documented in LDA   [] Wound Image Taken    Wound Care Consulted? No    Attending Nurse:  Maryellen Schroeder RN    Second RN/Staff Member:   Lucia George RN

## 2024-07-26 NOTE — CONSULTS
GENERAL SURGERY  INPATIENT CONSULT    REASON FOR CONSULT: Liver abscess    HPI: Jose Rafael Campbell is a 64 y.o. male with a history of Whipple for main duct IPMN in 2019, Nissen fundoplication, inguinal hernia repair, appendectomy who presented to outside emergency room with 2 day history of nausea, abdominal pain in the upper abdomen and 1 episode of diarrhea. He was also reporting chills and rigors but no fevers. He was found to be tachycardic with a heart rate of 110. Leukocytosis of 20.5 K. CT scan showed indeterminate peripherally enhancing right hepatic lesion concerning for abscess.  Patient was started on septic protocol received fluid resuscitation and started on IV antibiotics.  He was transferred to a higher level of care as [facility] the initial transfer set accepted to [facility]. General surgery was not contacted at [facility] prior to transfer. Labs upon arrival to [facility] were mostly unremarkable with a normal white blood cell count. Patient was hemodynamically stable. Underwent repeat MRI. General surgery has been consulted for evaluation.  On my visit patient was still has some mild upper abdominal pain but nothing severe.  Hemodynamically stable. Denies nausea or vomiting.  Reports only 1 episode of diarrhea prior to presentation.  No further diarrhea. Follows with Dr. King with surgical oncology at [facility].    Patient has a known history of multiple cyst throughout the liver and a subcapsular right posterior lobe hemangioma.Previously underwent IR drainage of left hepatic lobe cyst which drained cloudy fluid in 2021 with cultures returning as Klebsiella oxytoca. Repeat MRI imaging since that time have shown persistent cyst without any significant change and a right posterior lobe subcapsular hemangioma.    I have reviewed the patient's chart including prior progress notes, procedures and testing.     ROS:   Review of Systems    PROBLEM LIST:  Patient Active Problem List    Diagnosis    Pancreas cyst    Intraabdominal fluid collection    Cholangitis    Abdominal pain    Infection, actinomyces    Liver abscess    H/O Whipple procedure 2/2 IPMN by Dr Macias at [facility]    History of Clostridioides difficile colitis x2         HISTORY  Past Medical History:   Diagnosis Date    Abdominal pain     Cholangitis 2021    Diarrhea     IPMN (intraductal papillary mucinous neoplasm)     Nausea     Pancreas cyst     Pancreatic duct dilated     Pancreatitis        Past Surgical History:   Procedure Laterality Date    APPENDECTOMY      ENDOSCOPIC ULTRASOUND OF UPPER GASTROINTESTINAL TRACT N/A 10/3/2019    Procedure: ULTRASOUND, UPPER GI TRACT, ENDOSCOPIC;  Surgeon: Harsh Clark MD;  Location: [facility] ([address] [place] [address]);  Service: Endoscopy;  Laterality: N/A;    ESOPHAGOGASTRODUODENOSCOPY N/A 2021    Procedure: EGD (ESOPHAGOGASTRODUODENOSCOPY);  Surgeon: Mohsen Sosa MD;  Location: [facility] ([address] [place] [address]);  Service: Endoscopy;  Laterality: N/A;    INGUINAL HERNIA REPAIR      NISSEN FUNDOPLICATION      WHIPPLE PROCEDURE N/A 10/25/2019    Procedure: WHIPPLE PROCEDURE;  Surgeon: Paul King MD;  Location: [facility] OR [address] [place] [address];  Service: General;  Laterality: N/A;       Social History     Tobacco Use    Smoking status: Former     Current packs/day: 0.00     Average packs/day: 1 pack/day for 10.0 years (10.0 ttl pk-yrs)     Types: Cigarettes     Start date: 1989     Quit date: 1999     Years since quittin.5    Smokeless tobacco: Never   Substance Use Topics    Alcohol use: Not Currently    Drug use: Never       No family history on file.      MEDS:  No current facility-administered medications on file prior to encounter.     Current Outpatient Medications on File Prior to Encounter   Medication Sig Dispense Refill    acetaminophen (TYLENOL) 325 MG tablet Take 650 mg by mouth. Pt taking PRN      sucralfate (CARAFATE) 100 mg/mL suspension Take 1 g by mouth.      amoxicillin (AMOXIL) 500 MG  capsule Take by mouth.      COVID-19 vacc, mRNA,Pfizer,/PF (PFIZER COVID-19 VACCINE, EUA, IM) Inject 1 application into the muscle.      famotidine (PEPCID) 40 MG tablet Take 40 mg by mouth once daily.      flu vacc [id number] 6mos up,PF, (FLUARIX QUAD [id number], PF,) 60 mcg (15 mcg x 4)/0.5 mL Syrg       indomethacin (INDOCIN) 25 MG capsule       Lactobacillus acidophilus 500 million cell Cap Take by mouth.      NEXIUM 40 mg capsule Take on empty stomach.Take or use exactly as directed.Obtain advice for OTCs.Swallow whole.      omeprazole (PRILOSEC) 40 MG capsule Take 40 mg by mouth 2 (two) times daily.      simvastatin (ZOCOR) 40 MG tablet Take 40 mg by mouth.      vit B comp-C-FA-iron-vit E 500 mg-400 mcg- 18 mg iron Tab Take 1 tablet by mouth.      vitamin D (VITAMIN D3) 1000 units Tab Take 1,000 Units by mouth.         ALLERGIES:  Review of patient's allergies indicates:   Allergen Reactions    Codeine Other (See Comments)     headache    Aspirin Rash         VITALS:  Temp:  [97.5 °F (36.4 °C)-98.2 °F (36.8 °C)] 97.8 °F (36.6 °C)  Pulse:  [82-94] 85  Resp:  [16-22] 18  SpO2:  [94 %-98 %] 95 %  BP: (110-129)/(58-87) 111/64    I/O last 3 completed shifts:  In: 240 [P.O.:240]  Out: 700 [Urine:700]      PHYSICAL EXAM:  Physical Exam  Vitals reviewed.   Constitutional:       General: He is not in acute distress.     Appearance: Normal appearance. He is well-developed.   HENT:      Head: Normocephalic and atraumatic.   Eyes:      General: No scleral icterus.  Neck:      Trachea: No tracheal deviation.   Cardiovascular:      Rate and Rhythm: Normal rate and regular rhythm.      Pulses: Normal pulses.   Pulmonary:      Effort: Pulmonary effort is normal. No respiratory distress.      Breath sounds: Normal breath sounds.   Abdominal:      General: There is no distension.      Palpations: Abdomen is soft.      Tenderness: There is abdominal tenderness (mild tenderness in the epigastric and right upper quadrant). There is  no guarding or rebound.      Comments: Well-healed midline laparotomy   Musculoskeletal:         General: No swelling or tenderness. Normal range of motion.      Cervical back: Normal range of motion and neck supple. No rigidity.   Skin:     General: Skin is warm and dry.      Coloration: Skin is not jaundiced.      Findings: No erythema.   Neurological:      General: No focal deficit present.      Mental Status: He is alert and oriented to person, place, and time. He is not disoriented.      Motor: No weakness or abnormal muscle tone.   Psychiatric:         Mood and Affect: Mood normal.         Behavior: Behavior normal.         Thought Content: Thought content normal.         Judgment: Judgment normal.           LABS:  Lab Results   Component Value Date    WBC 7.76 07/26/2024    RBC 3.90 (L) 07/26/2024    HGB 11.9 (L) 07/26/2024    HCT 36.1 (L) 07/26/2024    HCT 34 (L) 10/25/2019     07/26/2024     Lab Results   Component Value Date    GLU 84 07/26/2024     07/26/2024    K 4.0 07/26/2024     07/26/2024    CO2 23 07/26/2024    BUN 15 07/26/2024    CREATININE 0.7 07/26/2024    CALCIUM 8.3 (L) 07/26/2024     Lab Results   Component Value Date    ALT 22 07/26/2024    AST 26 07/26/2024    ALKPHOS 58 07/26/2024    BILITOT 0.4 07/26/2024     Lab Results   Component Value Date    MG 1.9 07/26/2024    PHOS 3.4 07/14/2021       STUDIES:  Images and reports were personally reviewed.    MRI abdomen with and without contrast  FINDINGS:  There is degradation of the examination by motion.     The liver is normal in overall size.  There is a trace amount of perihepatic/perisplenic free fluid.  There are trace amounts of bilateral pleural fluid.     Previously defined hepatics cysts and right lobe hemangioma remain unchanged.     In the interval, there has been development of 2 new heterogeneously enhancing masses.  There is a 16 mm mass nail located anteriorly within the left hepatic lobe (series 12, image 34  and series 15, image 35).  A similar appearing mass centrally within the right lobe measures 13 mm (series 12, image 32, series 16, image 32).  Given the history of primary neoplasm, the findings are concerning for developing metastases.     The remainder of the visualized upper abdomen demonstrates no significant interval changes.  There is a hiatal hernia.     Impression:     Significant degradation by motion.     Development of 2 new enhancing lesions within the liver concerning for developing metastatic disease given history of primary pancreatic neoplasm.     Stable appearance of additional previously described cysts/hemangiomas.      ASSESSMENT & PLAN:  64 y.o. male with history of pancreatic IPMN status post Whipple, multiple chronic liver cyst and history of previous abscess, right posterior lobe hemangioma  -reason for transfer was possible liver abscess and sepsis, though I can not personally reviewed the CT scan from the outside emergency room it appears the area of concern was a peripherally enhancing right hepatic lesion measuring 2.9 cm, this has consistent with the known hemangioma seen on previous MRIs in the most recent MRI, low suspicion for abscess at this site  -patient does have multiple other liver cyst with a history of previous IR drainage which did have Klebsiella on the culture  -of most concern however 2 new heterogeneously enhancing lesions in the left hepatic lobe and central right lobe concerning for metastatic disease  -at this time the patient was not showing evidence of sepsis, he was on IV antibiotics, cultures from our hospital stay have not returned with any growth, uncertain of cultures taken from outside ER   -reasonable to treat with course of antibiotics due to concern for sepsis   -patient has reached out to a surgical oncologist in his awaiting reply, from my standpoint no immediate surgical intervention is recommended though the patient will need IR biopsy of the 2 new  liver lesions and possibly IR drainage of left hepatic cyst if there is any concern of abscess

## 2024-07-26 NOTE — CARE UPDATE
MRI of abd with and without iv contrast:   Development of 2 new enhancing lesions within the liver concerning for developing metastatic disease given history of primary pancreatic neoplasm.       D/w Dr Correia,  who  recommends transfer to [facility] for care with Dr Paul Macias, who is the primary surgeon of this pt and performed the previous whipple procedure.     Check AFP, CEA,      Addendum:  I spoke with Dr. King from [facility], he reviewed MRI report which shows solid mass, patient has been afebrile, no leukocytosis, this does not consistent with liver abscess.  Continue empiric IV antibiotics now, if culture remains negative, okay to discharge 1-2 days, no need to transfer to [facility] at this point.

## 2024-07-27 LAB
ALBUMIN SERPL BCP-MCNC: 3.7 G/DL (ref 3.5–5.2)
ALP SERPL-CCNC: 55 U/L (ref 55–135)
ALT SERPL W/O P-5'-P-CCNC: 19 U/L (ref 10–44)
ANION GAP SERPL CALC-SCNC: 8 MMOL/L (ref 8–16)
AST SERPL-CCNC: 19 U/L (ref 10–40)
BASOPHILS # BLD AUTO: 0.03 K/UL (ref 0–0.2)
BASOPHILS NFR BLD: 0.4 % (ref 0–1.9)
BILIRUB SERPL-MCNC: 0.4 MG/DL (ref 0.1–1)
BUN SERPL-MCNC: 10 MG/DL (ref 8–23)
C DIFF GDH STL QL: NEGATIVE
C DIFF TOX A+B STL QL IA: NEGATIVE
CALCIUM SERPL-MCNC: 8.8 MG/DL (ref 8.7–10.5)
CEA SERPL-MCNC: 1 NG/ML (ref 0–5)
CHLORIDE SERPL-SCNC: 104 MMOL/L (ref 95–110)
CO2 SERPL-SCNC: 28 MMOL/L (ref 23–29)
CREAT SERPL-MCNC: 0.7 MG/DL (ref 0.5–1.4)
DIFFERENTIAL METHOD BLD: ABNORMAL
EOSINOPHIL # BLD AUTO: 0.2 K/UL (ref 0–0.5)
EOSINOPHIL NFR BLD: 2.2 % (ref 0–8)
ERYTHROCYTE [DISTWIDTH] IN BLOOD BY AUTOMATED COUNT: 13.7 % (ref 11.5–14.5)
EST. GFR  (NO RACE VARIABLE): >60 ML/MIN/1.73 M^2
GLUCOSE SERPL-MCNC: 105 MG/DL (ref 70–110)
HCT VFR BLD AUTO: 37.1 % (ref 40–54)
HGB BLD-MCNC: 12.4 G/DL (ref 14–18)
IMM GRANULOCYTES # BLD AUTO: 0.06 K/UL (ref 0–0.04)
IMM GRANULOCYTES NFR BLD AUTO: 0.9 % (ref 0–0.5)
LYMPHOCYTES # BLD AUTO: 0.9 K/UL (ref 1–4.8)
LYMPHOCYTES NFR BLD: 12.9 % (ref 18–48)
MAGNESIUM SERPL-MCNC: 2 MG/DL (ref 1.6–2.6)
MCH RBC QN AUTO: 30.2 PG (ref 27–31)
MCHC RBC AUTO-ENTMCNC: 33.4 G/DL (ref 32–36)
MCV RBC AUTO: 91 FL (ref 82–98)
MONOCYTES # BLD AUTO: 0.9 K/UL (ref 0.3–1)
MONOCYTES NFR BLD: 13.5 % (ref 4–15)
NEUTROPHILS # BLD AUTO: 4.8 K/UL (ref 1.8–7.7)
NEUTROPHILS NFR BLD: 70.1 % (ref 38–73)
NRBC BLD-RTO: 0 /100 WBC
PHOSPHATE SERPL-MCNC: 3.1 MG/DL (ref 2.7–4.5)
PLATELET # BLD AUTO: 252 K/UL (ref 150–450)
PMV BLD AUTO: 9.3 FL (ref 9.2–12.9)
POTASSIUM SERPL-SCNC: 4 MMOL/L (ref 3.5–5.1)
PROCALCITONIN SERPL IA-MCNC: 0.84 NG/ML (ref 0–0.5)
PROT SERPL-MCNC: 6.5 G/DL (ref 6–8.4)
RBC # BLD AUTO: 4.1 M/UL (ref 4.6–6.2)
SODIUM SERPL-SCNC: 140 MMOL/L (ref 136–145)
WBC # BLD AUTO: 6.8 K/UL (ref 3.9–12.7)

## 2024-07-27 PROCEDURE — 80053 COMPREHEN METABOLIC PANEL: CPT | Performed by: STUDENT IN AN ORGANIZED HEALTH CARE EDUCATION/TRAINING PROGRAM

## 2024-07-27 PROCEDURE — 82378 CARCINOEMBRYONIC ANTIGEN: CPT | Performed by: HOSPITALIST

## 2024-07-27 PROCEDURE — 25000003 PHARM REV CODE 250: Performed by: HOSPITALIST

## 2024-07-27 PROCEDURE — 27000207 HC ISOLATION

## 2024-07-27 PROCEDURE — 36415 COLL VENOUS BLD VENIPUNCTURE: CPT | Performed by: HOSPITALIST

## 2024-07-27 PROCEDURE — 99231 SBSQ HOSP IP/OBS SF/LOW 25: CPT | Mod: ,,, | Performed by: SURGERY

## 2024-07-27 PROCEDURE — 63600175 PHARM REV CODE 636 W HCPCS: Performed by: STUDENT IN AN ORGANIZED HEALTH CARE EDUCATION/TRAINING PROGRAM

## 2024-07-27 PROCEDURE — 82105 ALPHA-FETOPROTEIN SERUM: CPT | Performed by: HOSPITALIST

## 2024-07-27 PROCEDURE — 25000003 PHARM REV CODE 250: Performed by: STUDENT IN AN ORGANIZED HEALTH CARE EDUCATION/TRAINING PROGRAM

## 2024-07-27 PROCEDURE — 85025 COMPLETE CBC W/AUTO DIFF WBC: CPT | Performed by: STUDENT IN AN ORGANIZED HEALTH CARE EDUCATION/TRAINING PROGRAM

## 2024-07-27 PROCEDURE — 87449 NOS EACH ORGANISM AG IA: CPT | Performed by: INTERNAL MEDICINE

## 2024-07-27 PROCEDURE — 12000002 HC ACUTE/MED SURGE SEMI-PRIVATE ROOM

## 2024-07-27 PROCEDURE — 83735 ASSAY OF MAGNESIUM: CPT | Performed by: STUDENT IN AN ORGANIZED HEALTH CARE EDUCATION/TRAINING PROGRAM

## 2024-07-27 PROCEDURE — 84145 PROCALCITONIN (PCT): CPT | Performed by: SURGERY

## 2024-07-27 PROCEDURE — 86301 IMMUNOASSAY TUMOR CA 19-9: CPT | Performed by: HOSPITALIST

## 2024-07-27 PROCEDURE — 84100 ASSAY OF PHOSPHORUS: CPT | Performed by: HOSPITALIST

## 2024-07-27 RX ADMIN — ATORVASTATIN CALCIUM 40 MG: 40 TABLET, FILM COATED ORAL at 08:07

## 2024-07-27 RX ADMIN — MUPIROCIN 1 G: 20 OINTMENT TOPICAL at 08:07

## 2024-07-27 RX ADMIN — PIPERACILLIN SODIUM AND TAZOBACTAM SODIUM 3.38 G: 3; .375 INJECTION, POWDER, LYOPHILIZED, FOR SOLUTION INTRAVENOUS at 04:07

## 2024-07-27 RX ADMIN — VANCOMYCIN HYDROCHLORIDE 125 MG: KIT at 08:07

## 2024-07-27 RX ADMIN — PANTOPRAZOLE SODIUM 40 MG: 40 TABLET, DELAYED RELEASE ORAL at 04:07

## 2024-07-27 RX ADMIN — HYDROCODONE BITARTRATE AND ACETAMINOPHEN 1 TABLET: 10; 325 TABLET ORAL at 08:07

## 2024-07-27 RX ADMIN — Medication 4 TABLET: at 11:07

## 2024-07-27 RX ADMIN — SUCRALFATE 1 G: 1 SUSPENSION ORAL at 11:07

## 2024-07-27 RX ADMIN — PIPERACILLIN SODIUM AND TAZOBACTAM SODIUM 3.38 G: 3; .375 INJECTION, POWDER, LYOPHILIZED, FOR SOLUTION INTRAVENOUS at 11:07

## 2024-07-27 RX ADMIN — PIPERACILLIN SODIUM AND TAZOBACTAM SODIUM 3.38 G: 3; .375 INJECTION, POWDER, LYOPHILIZED, FOR SOLUTION INTRAVENOUS at 08:07

## 2024-07-27 RX ADMIN — SUCRALFATE 1 G: 1 SUSPENSION ORAL at 08:07

## 2024-07-27 RX ADMIN — SUCRALFATE 1 G: 1 SUSPENSION ORAL at 04:07

## 2024-07-27 RX ADMIN — Medication 4 TABLET: at 04:07

## 2024-07-27 RX ADMIN — Medication 1000 UNITS: at 08:07

## 2024-07-27 RX ADMIN — Medication 4 TABLET: at 08:07

## 2024-07-27 NOTE — PROGRESS NOTES
[facility] Medicine  Progress Note    Patient Name: Jose Rafael Campbell  MRN: [id number]  Patient Class: IP- Inpatient   Admission Date: 7/25/2024  Length of Stay: 2 days  Attending Physician: Sharon Burr MD  Primary Care Provider: [facility]        Subjective:     Principal Problem:Liver abscess        HPI:  Patient is a 64-year-old male with a reported known past medical history of previous liver abscess and cysts (circa 2020) and s/p Whipple with Nissen fundoplication that presents the [facility] ER with 4 day history of nausea, diarrhea and abdominal pain. 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 Pt reports chills.   Per transfer note: Patient found to be tachycardic () and with low-grade fever (37.9C). Labs remarkable for leukocytosis with left shift (WBC 20.5K, 96.5% PMN). CTA of the abdomen showed colonic diverticulosis without evidence of acute diverticulitis and indeterminate 2.9 cm peripherally enhancing right hepatic lesion concerning for abscess. Sepsis protocol initiated and patient received 2L LR bolus and started on empiric IV antibiotics with cefepime and Flagyl. Transfer requested for higher level of care and access to general surgery.    Unable to review CT or labs. Surgery consulted. MRI abdomen ordered.     Overview/Hospital Course:  64-year-old male with history of IPMN status post whipple around 2 years ago, previous liver abscess status post drainage around 1 and half years ago, status post Nissen fundoplication  presenting here for recurrent liver abscess as a transfer from [facility].  Patient's previous surgeon was Dr. Pual Macias at [facility] with number [phone].  Started on IV Zosyn.  General surgery was consulted.  Patient also has a history of C diff x2, started on probiotic and low-dose vancomycin for C diff  prevention. Stool for c diff was ordered. Dr. Macias reviewed MRI and did not feel patient needed transfer [place] [facility] at [place]. Patient will need f/u outpatient with Dr. Macias. Patient was continued on abx until final culture results were obtained.    Interval History:   Patient states feeling better. Still with c diff. On vancomycin, but stool for c diff ordered. Final culture results pending.    Review of Systems   Constitutional:  Positive for chills, fatigue and fever. Negative for activity change.   HENT:  Negative for ear discharge, facial swelling and sore throat.    Eyes:  Negative for photophobia, pain and visual disturbance.   Respiratory:  Negative for apnea, cough and shortness of breath.    Cardiovascular:  Negative for chest pain and leg swelling.   Gastrointestinal:  Positive for abdominal pain and nausea. Negative for blood in stool.   Endocrine: Negative for cold intolerance and heat intolerance.   Musculoskeletal:  Negative for back pain and gait problem.   Skin:  Negative for pallor and rash.   Neurological:  Negative for speech difficulty and headaches.   Psychiatric/Behavioral:  Negative for confusion, hallucinations and suicidal ideas.    All other systems reviewed and are negative.    Objective:     Vital Signs (Most Recent):  Temp: 98.3 °F (36.8 °C) (07/27/24 0815)  Pulse: 86 (07/27/24 0815)  Resp: 15 (07/27/24 0815)  BP: 123/77 (07/27/24 0815)  SpO2: (!) 93 % (07/27/24 0815) Vital Signs (24h Range):  Temp:  [97.7 °F (36.5 °C)-98.3 °F (36.8 °C)] 98.3 °F (36.8 °C)  Pulse:  [78-90] 86  Resp:  [15-18] 15  SpO2:  [92 %-96 %] 93 %  BP: (105-123)/(59-77) 123/77     Weight: 64.2 kg (141 lb 8.6 oz)  Body mass index is 23.55 kg/m².    Intake/Output Summary (Last 24 hours) at 7/27/2024 0936  Last data filed at 7/27/2024 0917  Gross per 24 hour   Intake 600 ml   Output --   Net 600 ml         Physical Exam  Vitals and nursing note reviewed.   Constitutional:       General: He is not in acute  distress.     Appearance: He is not diaphoretic.   HENT:      Head: Normocephalic.   Eyes:      General: No scleral icterus.        Right eye: No discharge.         Left eye: No discharge.      Conjunctiva/sclera: Conjunctivae normal.   Neck:      Vascular: No JVD.   Cardiovascular:      Rate and Rhythm: Normal rate and regular rhythm.      Heart sounds: Normal heart sounds. No murmur heard.     No friction rub.   Pulmonary:      Effort: Pulmonary effort is normal. No respiratory distress.      Breath sounds: Normal breath sounds. No wheezing.   Abdominal:      General: Bowel sounds are normal. There is no distension.      Palpations: Abdomen is soft.      Tenderness: There is abdominal tenderness.      Comments: Right upper quadrant tenderness   Musculoskeletal:         General: Normal range of motion.   Lymphadenopathy:      Cervical: No cervical adenopathy.   Skin:     Findings: No erythema or rash.   Neurological:      Mental Status: He is alert and oriented to person, place, and time.      Deep Tendon Reflexes: Reflexes are normal and symmetric.   Psychiatric:         Behavior: Behavior normal.             Significant Labs: All pertinent labs within the past 24 hours have been reviewed.  CBC:   Recent Labs   Lab 07/25/24 1929 07/26/24  0528 07/27/24  0558   WBC 9.78 7.76 6.80   HGB 12.2* 11.9* 12.4*   HCT 38.4* 36.1* 37.1*    204 252     CMP:   Recent Labs   Lab 07/25/24 1929 07/26/24  0528 07/27/24  0558   * 137 140   K 4.3 4.0 4.0    103 104   CO2 19* 23 28   GLU 63* 84 105   BUN 17 15 10   CREATININE 0.7 0.7 0.7   CALCIUM 8.6* 8.3* 8.8   PROT 6.4 6.2 6.5   ALBUMIN 3.7 3.4* 3.7   BILITOT 0.4 0.4 0.4   ALKPHOS 59 58 55   AST 28 26 19   ALT 22 22 19   ANIONGAP 12 11 8       Significant Imaging: I have reviewed all pertinent imaging results/findings within the past 24 hours.  I have reviewed and interpreted all pertinent imaging results/findings within the past 24  hours.          Assessment/Plan:      * Liver abscess  F/u CBC and CMP  MRI abdomen w/wo contrast completed  [place]. King[place] [facility] and does not feel patient needs to be [place] [facility] [place].  Surgery following  Continue zosyn   Started on probiotic and p.o. vancomycin dose for C diff prevention      History of Clostridioides difficile colitis x2    Started on low-dose p.o. vancomycin and probiotics for C diff prevention; stool for c diff ordered    H/O Whipple procedure 2/2 IP[place] [facility]  [place]. King does not feel patient needs to be transferred at this time        VTE Risk Mitigation (From admission, onward)           Ordered     IP VTE LOW RISK PATIENT  Once         07/25/24 1836     Place sequential compression device  Until discontinued         07/25/24 1836                    Discharge Planning   ELAINE: 7/30/2024     Code Status: Full Code   Is the patient medically ready for discharge?:     Reason for patient still in hospital (select all that apply): Patient trending condition  Discharge Plan A: Home                  Sharon Burr MD, MD  Department of Hospital Medicine   [facility]

## 2024-07-27 NOTE — SUBJECTIVE & OBJECTIVE
Interval History:   Patient states feeling better. Still with c diff. On vancomycin, but stool for c diff ordered. Final culture results pending.    Review of Systems   Constitutional:  Positive for chills, fatigue and fever. Negative for activity change.   HENT:  Negative for ear discharge, facial swelling and sore throat.    Eyes:  Negative for photophobia, pain and visual disturbance.   Respiratory:  Negative for apnea, cough and shortness of breath.    Cardiovascular:  Negative for chest pain and leg swelling.   Gastrointestinal:  Positive for abdominal pain and nausea. Negative for blood in stool.   Endocrine: Negative for cold intolerance and heat intolerance.   Musculoskeletal:  Negative for back pain and gait problem.   Skin:  Negative for pallor and rash.   Neurological:  Negative for speech difficulty and headaches.   Psychiatric/Behavioral:  Negative for confusion, hallucinations and suicidal ideas.    All other systems reviewed and are negative.    Objective:     Vital Signs (Most Recent):  Temp: 98.3 °F (36.8 °C) (07/27/24 0815)  Pulse: 86 (07/27/24 0815)  Resp: 15 (07/27/24 0815)  BP: 123/77 (07/27/24 0815)  SpO2: (!) 93 % (07/27/24 0815) Vital Signs (24h Range):  Temp:  [97.7 °F (36.5 °C)-98.3 °F (36.8 °C)] 98.3 °F (36.8 °C)  Pulse:  [78-90] 86  Resp:  [15-18] 15  SpO2:  [92 %-96 %] 93 %  BP: (105-123)/(59-77) 123/77     Weight: 64.2 kg (141 lb 8.6 oz)  Body mass index is 23.55 kg/m².    Intake/Output Summary (Last 24 hours) at 7/27/2024 0936  Last data filed at 7/27/2024 0917  Gross per 24 hour   Intake 600 ml   Output --   Net 600 ml         Physical Exam  Vitals and nursing note reviewed.   Constitutional:       General: He is not in acute distress.     Appearance: He is not diaphoretic.   HENT:      Head: Normocephalic.   Eyes:      General: No scleral icterus.        Right eye: No discharge.         Left eye: No discharge.      Conjunctiva/sclera: Conjunctivae normal.   Neck:      Vascular: No  JVD.   Cardiovascular:      Rate and Rhythm: Normal rate and regular rhythm.      Heart sounds: Normal heart sounds. No murmur heard.     No friction rub.   Pulmonary:      Effort: Pulmonary effort is normal. No respiratory distress.      Breath sounds: Normal breath sounds. No wheezing.   Abdominal:      General: Bowel sounds are normal. There is no distension.      Palpations: Abdomen is soft.      Tenderness: There is abdominal tenderness.      Comments: Right upper quadrant tenderness   Musculoskeletal:         General: Normal range of motion.   Lymphadenopathy:      Cervical: No cervical adenopathy.   Skin:     Findings: No erythema or rash.   Neurological:      Mental Status: He is alert and oriented to person, place, and time.      Deep Tendon Reflexes: Reflexes are normal and symmetric.   Psychiatric:         Behavior: Behavior normal.             Significant Labs: All pertinent labs within the past 24 hours have been reviewed.  CBC:   Recent Labs   Lab 07/25/24 1929 07/26/24  0528 07/27/24  0558   WBC 9.78 7.76 6.80   HGB 12.2* 11.9* 12.4*   HCT 38.4* 36.1* 37.1*    204 252     CMP:   Recent Labs   Lab 07/25/24 1929 07/26/24  0528 07/27/24  0558   * 137 140   K 4.3 4.0 4.0    103 104   CO2 19* 23 28   GLU 63* 84 105   BUN 17 15 10   CREATININE 0.7 0.7 0.7   CALCIUM 8.6* 8.3* 8.8   PROT 6.4 6.2 6.5   ALBUMIN 3.7 3.4* 3.7   BILITOT 0.4 0.4 0.4   ALKPHOS 59 58 55   AST 28 26 19   ALT 22 22 19   ANIONGAP 12 11 8       Significant Imaging: I have reviewed all pertinent imaging results/findings within the past 24 hours.  I have reviewed and interpreted all pertinent imaging results/findings within the past 24 hours.

## 2024-07-27 NOTE — ASSESSMENT & PLAN NOTE
F/u CBC and CMP  MRI abdomen w/wo contrast completed  Dr. King, patient's surgeon at [facility], reviewed MRI and does not feel patient needs to be transferred to [facility] at this time.  Surgery following  Continue zosyn   Started on probiotic and p.o. vancomycin dose for C diff prevention

## 2024-07-27 NOTE — ASSESSMENT & PLAN NOTE
Started on low-dose p.o. vancomycin and probiotics for C diff prevention; stool for c diff ordered

## 2024-07-27 NOTE — PROGRESS NOTES
No acute events overnight.  Tolerating diet.  No significant pain.  Afebrile. Vital signs stable.    Temp:  [97.7 °F (36.5 °C)-98.3 °F (36.8 °C)] 98.3 °F (36.8 °C)  Pulse:  [78-90] 86  Resp:  [15-18] 15  SpO2:  [92 %-96 %] 93 %  BP: (105-123)/(59-77) 123/77    No acute distress, alert orient x3  Anicteric sclerae  Regular rate rhythm  Nonlabored breathing  Abdomen is soft without significant distention, mild tenderness in the epigastric and right upper abdomen, no rigidity, rebound or guarding    Blood culture 07/25/2024 -no growth today      No significant concern for liver abscess or sepsis at this time. New lesions seen on MRI maybe worked up as an outpatient. Okay for discharge home from general surgery standpoint.

## 2024-07-28 VITALS
OXYGEN SATURATION: 98 % | DIASTOLIC BLOOD PRESSURE: 71 MMHG | RESPIRATION RATE: 18 BRPM | WEIGHT: 141.56 LBS | HEIGHT: 65 IN | TEMPERATURE: 98 F | SYSTOLIC BLOOD PRESSURE: 133 MMHG | HEART RATE: 88 BPM | BODY MASS INDEX: 23.58 KG/M2

## 2024-07-28 LAB
AFP-TM SERPL-MCNC: 3.9 NG/ML (ref 0–8.4)
ALBUMIN SERPL BCP-MCNC: 3.7 G/DL (ref 3.5–5.2)
ALP SERPL-CCNC: 55 U/L (ref 55–135)
ALT SERPL W/O P-5'-P-CCNC: 15 U/L (ref 10–44)
ANION GAP SERPL CALC-SCNC: 9 MMOL/L (ref 8–16)
AST SERPL-CCNC: 17 U/L (ref 10–40)
BASOPHILS # BLD AUTO: 0.03 K/UL (ref 0–0.2)
BASOPHILS NFR BLD: 0.4 % (ref 0–1.9)
BILIRUB SERPL-MCNC: 0.4 MG/DL (ref 0.1–1)
BUN SERPL-MCNC: 9 MG/DL (ref 8–23)
CALCIUM SERPL-MCNC: 8.8 MG/DL (ref 8.7–10.5)
CANCER AG19-9 SERPL-ACNC: 18 U/ML (ref 0–35)
CHLORIDE SERPL-SCNC: 103 MMOL/L (ref 95–110)
CO2 SERPL-SCNC: 28 MMOL/L (ref 23–29)
CREAT SERPL-MCNC: 0.7 MG/DL (ref 0.5–1.4)
DIFFERENTIAL METHOD BLD: ABNORMAL
EOSINOPHIL # BLD AUTO: 0.2 K/UL (ref 0–0.5)
EOSINOPHIL NFR BLD: 2.3 % (ref 0–8)
ERYTHROCYTE [DISTWIDTH] IN BLOOD BY AUTOMATED COUNT: 13.8 % (ref 11.5–14.5)
EST. GFR  (NO RACE VARIABLE): >60 ML/MIN/1.73 M^2
GLUCOSE SERPL-MCNC: 103 MG/DL (ref 70–110)
HCT VFR BLD AUTO: 37.6 % (ref 40–54)
HGB BLD-MCNC: 12.4 G/DL (ref 14–18)
IMM GRANULOCYTES # BLD AUTO: 0.05 K/UL (ref 0–0.04)
IMM GRANULOCYTES NFR BLD AUTO: 0.6 % (ref 0–0.5)
LYMPHOCYTES # BLD AUTO: 1.1 K/UL (ref 1–4.8)
LYMPHOCYTES NFR BLD: 13.6 % (ref 18–48)
MAGNESIUM SERPL-MCNC: 2 MG/DL (ref 1.6–2.6)
MCH RBC QN AUTO: 30.4 PG (ref 27–31)
MCHC RBC AUTO-ENTMCNC: 33 G/DL (ref 32–36)
MCV RBC AUTO: 92 FL (ref 82–98)
MONOCYTES # BLD AUTO: 1.1 K/UL (ref 0.3–1)
MONOCYTES NFR BLD: 12.6 % (ref 4–15)
NEUTROPHILS # BLD AUTO: 5.9 K/UL (ref 1.8–7.7)
NEUTROPHILS NFR BLD: 70.5 % (ref 38–73)
NRBC BLD-RTO: 0 /100 WBC
PHOSPHATE SERPL-MCNC: 3.6 MG/DL (ref 2.7–4.5)
PLATELET # BLD AUTO: 259 K/UL (ref 150–450)
PMV BLD AUTO: 9.1 FL (ref 9.2–12.9)
POTASSIUM SERPL-SCNC: 3.8 MMOL/L (ref 3.5–5.1)
PROT SERPL-MCNC: 6.4 G/DL (ref 6–8.4)
RBC # BLD AUTO: 4.08 M/UL (ref 4.6–6.2)
SODIUM SERPL-SCNC: 140 MMOL/L (ref 136–145)
WBC # BLD AUTO: 8.32 K/UL (ref 3.9–12.7)

## 2024-07-28 PROCEDURE — 85025 COMPLETE CBC W/AUTO DIFF WBC: CPT | Performed by: STUDENT IN AN ORGANIZED HEALTH CARE EDUCATION/TRAINING PROGRAM

## 2024-07-28 PROCEDURE — 25000003 PHARM REV CODE 250: Performed by: HOSPITALIST

## 2024-07-28 PROCEDURE — 36415 COLL VENOUS BLD VENIPUNCTURE: CPT | Performed by: STUDENT IN AN ORGANIZED HEALTH CARE EDUCATION/TRAINING PROGRAM

## 2024-07-28 PROCEDURE — 80053 COMPREHEN METABOLIC PANEL: CPT | Performed by: STUDENT IN AN ORGANIZED HEALTH CARE EDUCATION/TRAINING PROGRAM

## 2024-07-28 PROCEDURE — 25000003 PHARM REV CODE 250: Performed by: STUDENT IN AN ORGANIZED HEALTH CARE EDUCATION/TRAINING PROGRAM

## 2024-07-28 PROCEDURE — 83735 ASSAY OF MAGNESIUM: CPT | Performed by: STUDENT IN AN ORGANIZED HEALTH CARE EDUCATION/TRAINING PROGRAM

## 2024-07-28 PROCEDURE — 84100 ASSAY OF PHOSPHORUS: CPT | Performed by: HOSPITALIST

## 2024-07-28 PROCEDURE — 63600175 PHARM REV CODE 636 W HCPCS: Performed by: STUDENT IN AN ORGANIZED HEALTH CARE EDUCATION/TRAINING PROGRAM

## 2024-07-28 RX ADMIN — PIPERACILLIN SODIUM AND TAZOBACTAM SODIUM 3.38 G: 3; .375 INJECTION, POWDER, LYOPHILIZED, FOR SOLUTION INTRAVENOUS at 04:07

## 2024-07-28 RX ADMIN — VANCOMYCIN HYDROCHLORIDE 125 MG: KIT at 08:07

## 2024-07-28 RX ADMIN — Medication 1000 UNITS: at 08:07

## 2024-07-28 RX ADMIN — MUPIROCIN 1 G: 20 OINTMENT TOPICAL at 08:07

## 2024-07-28 RX ADMIN — Medication 4 TABLET: at 08:07

## 2024-07-28 RX ADMIN — SUCRALFATE 1 G: 1 SUSPENSION ORAL at 04:07

## 2024-07-28 RX ADMIN — PANTOPRAZOLE SODIUM 40 MG: 40 TABLET, DELAYED RELEASE ORAL at 04:07

## 2024-07-28 NOTE — PLAN OF CARE
Patient cleared for discharge from case management standpoint.    Follow up appointments scheduled and added to AVS.    Chart and discharge orders reviewed.  Patient discharged home with no further case management needs.       07/28/24 0850   Final Note   Assessment Type Final Discharge Note   Anticipated Discharge Disposition Home   What phone number can be called within the next 1-3 days to see how you are doing after discharge? [phone]   Post-Acute Status   Discharge Delays None known at this time

## 2024-07-28 NOTE — DISCHARGE SUMMARY
[facility] Medicine  Discharge Summary      Patient Name: Jose Rafael Campbell  MRN: [id number]  MAYO: [id number]  Patient Class: IP- Inpatient  Admission Date: 7/25/2024  Hospital Length of Stay: 3 days  Discharge Date and Time:  07/28/2024 8:39 AM  Attending Physician: Sharon Burr MD   Discharging Provider: Sharon Burr MD, MD  Primary Care Provider: [facility]    Primary Care Team: Networked reference to record PCT     HPI:   Patient is a 64-year-old male with a reported known past medical history of previous liver abscess and cysts (circa 2020) and s/p Whipple with Nissen fundoplication that presents the [facility] ER with 4 day history of nausea, diarrhea and abdominal pain. 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 Pt reports chills.   Per transfer note: Patient found to be tachycardic () and with low-grade fever (37.9C). Labs remarkable for leukocytosis with left shift (WBC 20.5K, 96.5% PMN). CTA of the abdomen showed colonic diverticulosis without evidence of acute diverticulitis and indeterminate 2.9 cm peripherally enhancing right hepatic lesion concerning for abscess. Sepsis protocol initiated and patient received 2L LR bolus and started on empiric IV antibiotics with cefepime and Flagyl. Transfer requested for higher level of care and access to general surgery.    Unable to review CT or labs. Surgery consulted. MRI abdomen ordered.     * No surgery found *      Hospital Course:   64-year-old male with history of IPMN status post whipple around 2 years ago, previous liver abscess status post drainage around 1 and half years ago, status post Nissen fundoplication  presenting here for recurrent liver abscess as a transfer from [facility].  Patient's previous surgeon was Dr. Paul Macias at [facility] with number [phone].   Started on IV Zosyn.  General surgery was consulted.  Patient also has a history of C diff x2, started on probiotic and low-dose vancomycin for C diff prevention. Stool for c diff was ordered. Dr. Macias reviewed MRI and did not feel patient needed transfer to [facility] at this time. Patient will need f/u outpatient with Dr. Macias. Patient was continued on abx until final culture results were obtained. Cultures were negative. Patient discharged home in stable condition.      Goals of Care Treatment Preferences:  Code Status: Full Code      Consults:   Consults (From admission, onward)          Status Ordering Provider     Inpatient consult to   Once        Provider:  (Not yet assigned)    Completed KENISHA TUCKER     Inpatient consult to General Surgery  Once        Provider:  Kana Correia Jr., MD    Completed JONNATHAN WILSON            ID  History of Clostridioides difficile colitis x2  Stool negative for c diff this admission    GI  * Liver abscess  F/u CBC and CMP  MRI abdomen w/wo contrast completed  Dr. King, patient's surgeon at [facility], reviewed MRI and does not feel patient needs to be transferred to [facility] at this time.  Cultures negative, patient being discharged home off abx, to f/u with Dr. King  outpatient      H/O Whipple procedure 2/2 IPMN by Dr Macias at [facility]  F/U outpatient        Final Active Diagnoses:    Diagnosis Date Noted POA    PRINCIPAL PROBLEM:  Liver abscess [K75.0] 07/24/2024 Yes    H/O Whipple procedure 2/2 IPMN by Dr Macias at [facility] [Z90.410, Z90.49] 07/26/2024 Not Applicable    History of Clostridioides difficile colitis x2 [Z86.19] 07/26/2024 Not Applicable      Problems Resolved During this Admission:       Discharged Condition: stable    Disposition: Home or Self Care    Follow Up:    Patient Instructions:      Diet Adult Regular     Activity as tolerated       Significant Diagnostic Studies: N/A    Pending Diagnostic Studies:       None           Medications:  Reconciled  Home Medications:      Medication List        CONTINUE taking these medications      acetaminophen 325 MG tablet  Commonly known as: TYLENOL  Take 650 mg by mouth. Pt taking PRN     Lactobacillus acidophilus 500 million cell Cap  Take by mouth.     NexIUM 40 mg capsule  Generic drug: esomeprazole  Take on empty stomach.Take or use exactly as directed.Obtain advice for OTCs.Swallow whole.     simvastatin 40 MG tablet  Commonly known as: ZOCOR  Take 40 mg by mouth.     sucralfate 100 mg/mL suspension  Commonly known as: CARAFATE  Take 1 g by mouth.     vit B comp-C-FA-iron-vit E 500 mg-400 mcg- 18 mg iron Tab  Take 1 tablet by mouth.     vitamin D 1000 units Tab  Commonly known as: VITAMIN D3  Take 1,000 Units by mouth.            STOP taking these medications      amoxicillin 500 MG capsule  Commonly known as: AMOXIL     famotidine 40 MG tablet  Commonly known as: PEPCID     FLUARIX QUAD 4112-5470 (PF) 60 mcg (15 mcg x 4)/0.5 mL Syrg  Generic drug: flu vacc [id number] 6mos up(PF)     indomethacin 25 MG capsule  Commonly known as: INDOCIN     omeprazole 40 MG capsule  Commonly known as: PRILOSEC     PFIZER COVID-19 VACCINE (EUA) IM              Indwelling Lines/Drains at time of discharge:   Lines/Drains/Airways       None                   Time spent on the discharge of patient: 30 minutes         Sharon Burr MD, MD  Department of Hospital Medicine  [facility]

## 2024-07-28 NOTE — ASSESSMENT & PLAN NOTE
F/u CBC and CMP  MRI abdomen w/wo contrast completed  Dr. King, patient's surgeon at [facility], reviewed MRI and does not feel patient needs to be transferred to [facility] at this time.  Cultures negative, patient being discharged home off abx, to f/u with Dr. King  outpatient

## 2024-07-29 ENCOUNTER — TELEPHONE (OUTPATIENT)
Dept: SURGERY | Facility: CLINIC | Age: 64
End: 2024-07-29
Payer: MEDICARE

## 2024-07-29 NOTE — TELEPHONE ENCOUNTER
Received incoming call from pt spouse. Reports pt was discharged yesterday from hospital. She is requesting hospital follow up. Offered August 7th with NP. Pt spouse decline stating she would prefer appt with Dr. King. Appt scheduled for August 14th. Time date location provided. Pt spouse verbalized understanding.

## 2024-07-30 LAB — BACTERIA BLD CULT: NORMAL

## 2024-08-14 ENCOUNTER — OFFICE VISIT (OUTPATIENT)
Dept: SURGERY | Facility: CLINIC | Age: 64
End: 2024-08-14
Payer: MEDICARE

## 2024-08-14 ENCOUNTER — TELEPHONE (OUTPATIENT)
Dept: SURGERY | Facility: CLINIC | Age: 64
End: 2024-08-14
Payer: MEDICARE

## 2024-08-14 VITALS
WEIGHT: 138.31 LBS | SYSTOLIC BLOOD PRESSURE: 104 MMHG | BODY MASS INDEX: 23.02 KG/M2 | OXYGEN SATURATION: 96 % | DIASTOLIC BLOOD PRESSURE: 71 MMHG | HEART RATE: 90 BPM

## 2024-08-14 DIAGNOSIS — K75.0 LIVER ABSCESS: Primary | ICD-10-CM

## 2024-08-14 PROCEDURE — 99213 OFFICE O/P EST LOW 20 MIN: CPT | Mod: PBBFAC | Performed by: SURGERY

## 2024-08-14 PROCEDURE — 99999 PR PBB SHADOW E&M-EST. PATIENT-LVL III: CPT | Mod: PBBFAC,,, | Performed by: SURGERY

## 2024-08-14 PROCEDURE — 99499 UNLISTED E&M SERVICE: CPT | Mod: S$PBB,,, | Performed by: SURGERY

## 2024-08-14 RX ORDER — WHEAT DEXTRIN 3 G/3.5 G
15 POWDER IN PACKET (EA) ORAL
COMMUNITY
Start: 2024-01-27

## 2024-08-14 RX ORDER — OMEPRAZOLE 40 MG/1
40 CAPSULE, DELAYED RELEASE ORAL
COMMUNITY

## 2024-08-14 NOTE — TELEPHONE ENCOUNTER
----- Message from Princess Tracy sent at 8/14/2024  2:32 PM CDT -----  Contact: [phone]  PATIENTCALL     Pt is calling to speak with the nurse in provider office regarding she have some information about the referral not needed. She  is asking for a return call please call.

## 2024-08-14 NOTE — TELEPHONE ENCOUNTER
Returned call. Spoke to pt spouse. Questions answered about referral process to IR. No additional questions at this time.

## 2024-08-14 NOTE — PROGRESS NOTES
Jose Rafael is well known to me s/p resection of an IPMN with a whipple in 2019 where he was found to have high grade dysplasia but no invasive cancer.  He has since been on surveillance. His course was complicated by a liver abscess in 2021 that resolved with perc drainage and abx and has been doing well since.    Recent admission for abdominal pain and fever which has resolved, but inpatient imaging shows two new liver lesions:  MRI:  Impression:     Significant degradation by motion.     Development of 2 new enhancing lesions within the liver concerning for developing metastatic disease given history of primary pancreatic neoplasm.     Stable appearance of additional previously described cysts/hemangiomas.      We discussed this. I think we are obligated to biopsy but its hard to imaging these reflect anything other than infectious etiology given the fact that his original resection specimen did not show invasive cancer. If his biopsies are negative I would favor repeat short interval imaging in 3-4 months.      Paul King MD  Upper GI / Hepatobiliary Surgical Oncology  [facility] [place], [place]  Office: [phone]  Fax: [phone]

## 2024-08-15 NOTE — PROGRESS NOTES
Met with pt and spouse. Plan of care reviewed. Will schedule clinic appt after IR biopsy. Pt and spouse verbalized understanding.

## 2024-09-03 ENCOUNTER — LAB VISIT (OUTPATIENT)
Dept: LAB | Facility: HOSPITAL | Age: 64
End: 2024-09-03
Payer: MEDICARE

## 2024-09-03 ENCOUNTER — OFFICE VISIT (OUTPATIENT)
Dept: INTERVENTIONAL RADIOLOGY/VASCULAR | Facility: CLINIC | Age: 64
End: 2024-09-03
Payer: MEDICARE

## 2024-09-03 VITALS
BODY MASS INDEX: 23.57 KG/M2 | SYSTOLIC BLOOD PRESSURE: 103 MMHG | WEIGHT: 141.44 LBS | DIASTOLIC BLOOD PRESSURE: 66 MMHG | HEIGHT: 65 IN | HEART RATE: 81 BPM

## 2024-09-03 DIAGNOSIS — R16.0 LIVER MASS: Primary | ICD-10-CM

## 2024-09-03 DIAGNOSIS — R16.0 LIVER MASS: ICD-10-CM

## 2024-09-03 LAB
BASOPHILS # BLD AUTO: 0.06 K/UL (ref 0–0.2)
BASOPHILS NFR BLD: 1 % (ref 0–1.9)
DIFFERENTIAL METHOD BLD: ABNORMAL
EOSINOPHIL # BLD AUTO: 0.2 K/UL (ref 0–0.5)
EOSINOPHIL NFR BLD: 3 % (ref 0–8)
ERYTHROCYTE [DISTWIDTH] IN BLOOD BY AUTOMATED COUNT: 14.1 % (ref 11.5–14.5)
HCT VFR BLD AUTO: 39.9 % (ref 40–54)
HGB BLD-MCNC: 12.4 G/DL (ref 14–18)
IMM GRANULOCYTES # BLD AUTO: 0.02 K/UL (ref 0–0.04)
IMM GRANULOCYTES NFR BLD AUTO: 0.3 % (ref 0–0.5)
INR PPP: 0.9 (ref 0.8–1.2)
LYMPHOCYTES # BLD AUTO: 1.5 K/UL (ref 1–4.8)
LYMPHOCYTES NFR BLD: 25 % (ref 18–48)
MCH RBC QN AUTO: 28.8 PG (ref 27–31)
MCHC RBC AUTO-ENTMCNC: 31.1 G/DL (ref 32–36)
MCV RBC AUTO: 93 FL (ref 82–98)
MONOCYTES # BLD AUTO: 0.7 K/UL (ref 0.3–1)
MONOCYTES NFR BLD: 11.1 % (ref 4–15)
NEUTROPHILS # BLD AUTO: 3.6 K/UL (ref 1.8–7.7)
NEUTROPHILS NFR BLD: 59.6 % (ref 38–73)
NRBC BLD-RTO: 0 /100 WBC
PLATELET # BLD AUTO: 366 K/UL (ref 150–450)
PMV BLD AUTO: 9.2 FL (ref 9.2–12.9)
PROTHROMBIN TIME: 9.9 SEC (ref 9–12.5)
RBC # BLD AUTO: 4.3 M/UL (ref 4.6–6.2)
WBC # BLD AUTO: 6.05 K/UL (ref 3.9–12.7)

## 2024-09-03 PROCEDURE — 85610 PROTHROMBIN TIME: CPT | Performed by: FAMILY MEDICINE

## 2024-09-03 PROCEDURE — 99213 OFFICE O/P EST LOW 20 MIN: CPT | Mod: PBBFAC | Performed by: FAMILY MEDICINE

## 2024-09-03 PROCEDURE — 99204 OFFICE O/P NEW MOD 45 MIN: CPT | Mod: S$PBB,,, | Performed by: FAMILY MEDICINE

## 2024-09-03 PROCEDURE — 85025 COMPLETE CBC W/AUTO DIFF WBC: CPT | Performed by: FAMILY MEDICINE

## 2024-09-03 PROCEDURE — 99999 PR PBB SHADOW E&M-EST. PATIENT-LVL III: CPT | Mod: PBBFAC,,, | Performed by: FAMILY MEDICINE

## 2024-09-03 PROCEDURE — 36415 COLL VENOUS BLD VENIPUNCTURE: CPT | Performed by: FAMILY MEDICINE

## 2024-09-03 RX ORDER — FAMOTIDINE 40 MG/1
40 TABLET, FILM COATED ORAL DAILY
COMMUNITY

## 2024-09-03 NOTE — Clinical Note
"Thank you for referring Mr. Campbell to Interventional Radiology at the [facility]. Please don't hesitate to contact us if there are any questions regarding this evaluation at [phone]. If you have any other patients for whom you would like a consultation, please place an order for "[id number]", and we will be happy to review their case.  Sincerely, TIERNEY Zayas, FNP Interventional Radiology   "

## 2024-09-03 NOTE — LETTER
September 3, 2024    Jose Rafael Campbell  [address]  [place] [address]     [facility] [address]  [address]  [facility] [place] [facility] [address]  Phone: [phone] PRE-PROCEDURE INSTRUCTIONS    Your procedure with Interventional Radiology is scheduled for _______________________.     Please arrive by _______________________. Please note your appointment time in [facility] will be different.    You must check-in and receive a wristband before going to your procedure. We will call you the day before to let you know your check-in location.    **Do not eat or drink anything between midnight and the time of your procedure. This includes gum, mints, and candy lemon drops.    **Do not smoke or drink alcoholic beverages 24 hours prior to your procedure.    **If you wear contact lenses, dentures, hearing aids, or glasses, bring a container to put them in during the procedure and give them to a family member for safekeeping.    **If you have been diagnosed with sleep apnea please bring your CPAP machine.    **If your doctor has scheduled you for an overnight stay, bring a small overnight bag with any personal items that you may need.    **Make arrangements in advance for transportation home by a responsible adult. It is not safe to drive a vehicle during the 24 hours following the procedure.    **All [facility] facilities and properties are tobacco free. Smoking is NOT allowed.    PLEASE NOTE: The procedure schedule has many variables which affect the time of your procedure. Family members should be available if your surgery time changes.    If you have any questions about these instructions call Interventional Radiology at [phone] Monday - Friday between 8:00am and 4:00pm or [phone] (ask for interventional radiology physician) for after hours.

## 2024-09-09 ENCOUNTER — PATIENT MESSAGE (OUTPATIENT)
Dept: INTERVENTIONAL RADIOLOGY/VASCULAR | Facility: HOSPITAL | Age: 64
End: 2024-09-09
Payer: MEDICARE

## 2024-09-13 ENCOUNTER — TELEPHONE (OUTPATIENT)
Dept: INTERVENTIONAL RADIOLOGY/VASCULAR | Facility: HOSPITAL | Age: 64
End: 2024-09-13
Payer: MEDICARE

## 2024-09-13 NOTE — NURSING
Pre-procedure call complete.  Spoke to Pt's wife Tiffany  (involvement of care  per pts permission).  Instructed to have patient not to eat or drink anything after midnight the night before procedure.  Aware will need someone to provide transport home and monitor pt 8 hours post procedure.  No driving for at least 24 hours after procedure.  Patient's wife verbalized aware of which medications to take.  Do not take sleep medication (including OTC) and anxiety medication the night before procedure.  Arrival time and location given.  Expected length of stay reviewed.  Covid screening completed.  Pt's wife verbalized understanding of all pre-procedure instructions.  Written instructions and directions sent to patient in [employer]/portal.  Tiffany verbalized understanding.

## 2024-09-16 ENCOUNTER — HOSPITAL ENCOUNTER (OUTPATIENT)
Dept: INTERVENTIONAL RADIOLOGY/VASCULAR | Facility: HOSPITAL | Age: 64
Discharge: HOME | End: 2024-09-16
Attending: FAMILY MEDICINE
Payer: MEDICARE

## 2024-09-16 VITALS
SYSTOLIC BLOOD PRESSURE: 148 MMHG | HEIGHT: 65 IN | WEIGHT: 139 LBS | TEMPERATURE: 97 F | RESPIRATION RATE: 18 BRPM | HEART RATE: 72 BPM | DIASTOLIC BLOOD PRESSURE: 77 MMHG | OXYGEN SATURATION: 96 % | BODY MASS INDEX: 23.16 KG/M2

## 2024-09-16 DIAGNOSIS — R16.0 LIVER MASS: ICD-10-CM

## 2024-09-16 PROCEDURE — 25000003 PHARM REV CODE 250: Performed by: STUDENT IN AN ORGANIZED HEALTH CARE EDUCATION/TRAINING PROGRAM

## 2024-09-16 PROCEDURE — 99152 MOD SED SAME PHYS/QHP 5/>YRS: CPT | Performed by: STUDENT IN AN ORGANIZED HEALTH CARE EDUCATION/TRAINING PROGRAM

## 2024-09-16 PROCEDURE — 77012 CT SCAN FOR NEEDLE BIOPSY: CPT | Mod: 26,,, | Performed by: STUDENT IN AN ORGANIZED HEALTH CARE EDUCATION/TRAINING PROGRAM

## 2024-09-16 PROCEDURE — 47000 NEEDLE BIOPSY OF LIVER PERQ: CPT | Performed by: STUDENT IN AN ORGANIZED HEALTH CARE EDUCATION/TRAINING PROGRAM

## 2024-09-16 PROCEDURE — 25000003 PHARM REV CODE 250

## 2024-09-16 PROCEDURE — 99153 MOD SED SAME PHYS/QHP EA: CPT | Performed by: STUDENT IN AN ORGANIZED HEALTH CARE EDUCATION/TRAINING PROGRAM

## 2024-09-16 PROCEDURE — 99152 MOD SED SAME PHYS/QHP 5/>YRS: CPT | Mod: ,,, | Performed by: STUDENT IN AN ORGANIZED HEALTH CARE EDUCATION/TRAINING PROGRAM

## 2024-09-16 PROCEDURE — 63600175 PHARM REV CODE 636 W HCPCS: Performed by: STUDENT IN AN ORGANIZED HEALTH CARE EDUCATION/TRAINING PROGRAM

## 2024-09-16 RX ORDER — LIDOCAINE HYDROCHLORIDE 10 MG/ML
INJECTION, SOLUTION INFILTRATION; PERINEURAL
Status: COMPLETED | OUTPATIENT
Start: 2024-09-16 | End: 2024-09-16

## 2024-09-16 RX ORDER — LIDOCAINE HYDROCHLORIDE 10 MG/ML
1 INJECTION, SOLUTION EPIDURAL; INFILTRATION; INTRACAUDAL; PERINEURAL ONCE
Status: DISCONTINUED | OUTPATIENT
Start: 2024-09-16 | End: 2024-09-17 | Stop reason: HOSPADM

## 2024-09-16 RX ORDER — SODIUM CHLORIDE 9 MG/ML
INJECTION, SOLUTION INTRAVENOUS CONTINUOUS
Status: DISCONTINUED | OUTPATIENT
Start: 2024-09-16 | End: 2024-09-17 | Stop reason: HOSPADM

## 2024-09-16 RX ORDER — MIDAZOLAM HYDROCHLORIDE 1 MG/ML
INJECTION, SOLUTION INTRAMUSCULAR; INTRAVENOUS
Status: COMPLETED | OUTPATIENT
Start: 2024-09-16 | End: 2024-09-16

## 2024-09-16 RX ORDER — FENTANYL CITRATE 50 UG/ML
INJECTION, SOLUTION INTRAMUSCULAR; INTRAVENOUS
Status: COMPLETED | OUTPATIENT
Start: 2024-09-16 | End: 2024-09-16

## 2024-09-16 RX ORDER — ACETAMINOPHEN 500 MG
TABLET ORAL
Status: DISCONTINUED
Start: 2024-09-16 | End: 2024-09-17 | Stop reason: HOSPADM

## 2024-09-16 RX ORDER — ACETAMINOPHEN 500 MG
1000 TABLET ORAL EVERY 6 HOURS PRN
Status: DISCONTINUED | OUTPATIENT
Start: 2024-09-16 | End: 2024-09-17 | Stop reason: HOSPADM

## 2024-09-16 RX ADMIN — FENTANYL CITRATE 50 MCG: 50 INJECTION, SOLUTION INTRAMUSCULAR; INTRAVENOUS at 01:09

## 2024-09-16 RX ADMIN — LIDOCAINE HYDROCHLORIDE 3 ML: 10 INJECTION, SOLUTION INFILTRATION; PERINEURAL at 01:09

## 2024-09-16 RX ADMIN — MIDAZOLAM HYDROCHLORIDE 1 MG: 2 INJECTION, SOLUTION INTRAMUSCULAR; INTRAVENOUS at 01:09

## 2024-09-16 RX ADMIN — FENTANYL CITRATE 63 MCG: 50 INJECTION, SOLUTION INTRAMUSCULAR; INTRAVENOUS at 01:09

## 2024-09-16 RX ADMIN — Medication 1 EACH: at 01:09

## 2024-09-16 RX ADMIN — ACETAMINOPHEN 1000 MG: 500 TABLET ORAL at 02:09

## 2024-09-16 NOTE — H&P
Radiology History & Physical      SUBJECTIVE:     Chief Complaint: liver mass    History of Present Illness:  Jose Rafael Campbell is a 64 y.o. male with history of IPNM s/p whipple who presents for liver mass biopsy.  Past Medical History:   Diagnosis Date    Abdominal pain     Cholangitis 06/30/2021    Diarrhea     General anesthetics causing adverse effect in therapeutic use     IPMN (intraductal papillary mucinous neoplasm)     Nausea     Nontoxic single thyroid nodule     Pancreas cyst     Pancreatic duct dilated     Pancreatitis     Sleep apnea      Past Surgical History:   Procedure Laterality Date    APPENDECTOMY      ENDOSCOPIC ULTRASOUND OF UPPER GASTROINTESTINAL TRACT N/A 10/3/2019    Procedure: ULTRASOUND, UPPER GI TRACT, ENDOSCOPIC;  Surgeon: Harsh Clark MD;  [facility]  Service: Endoscopy;  Laterality: N/A;    ESOPHAGOGASTRODUODENOSCOPY N/A 7/12/2021    Procedure: EGD (ESOPHAGOGASTRODUODENOSCOPY);  Surgeon: Mohsen Sosa MD;  [facility]  Service: Endoscopy;  Laterality: N/A;    INGUINAL HERNIA REPAIR      NISSEN FUNDOPLICATION      WHIPPLE PROCEDURE N/A 10/25/2019    Procedure: WHIPPLE PROCEDURE;  Surgeon: Paul King MD;  Location: [facility] OR [address] [place] [address];  Service: General;  Laterality: N/A;       Home Meds:   Prior to Admission medications    Medication Sig Start Date End Date Taking? Authorizing Provider   acetaminophen (TYLENOL) 325 MG tablet Take 650 mg by mouth. Pt taking PRN   Yes Provider, Historical   famotidine (PEPCID) 40 MG tablet Take 40 mg by mouth once daily.   Yes Provider, Historical   omeprazole (PRILOSEC) 40 MG capsule Take 40 mg by mouth 2 (two) times daily before meals.   Yes Provider, Historical   simvastatin (ZOCOR) 40 MG tablet Take 40 mg by mouth.   Yes Provider, Historical   vit B comp-C-FA-iron-vit E 500 mg-400 mcg- 18 mg iron Tab Take 1 tablet by mouth.   Yes Provider, Historical   vitamin D (VITAMIN D3) 1000 units Tab Take 1,000 Units by  mouth.   Yes Provider, Historical   sucralfate (CARAFATE) 100 mg/mL suspension Take 1 g by mouth. 12/13/17   Provider, Historical   wheat dextrin (BENEFIBER CLEAR SF, DEXTRIN,) 3 gram/3.5 gram PwPk 15 mLs. 1/27/24   Provider, Historical     Anticoagulants/Antiplatelets: no anticoagulation    Allergies:   Review of patient's allergies indicates:   Allergen Reactions    Codeine Other (See Comments)     headache     Sedation History:   reports adverse reaction to general anesthesia about 15 years ago    Review of Systems:   Hematological: no known coagulopathies  Respiratory: no shortness of breath  Cardiovascular: no chest pain  Gastrointestinal: no abdominal pain  Genito-Urinary: no dysuria  Musculoskeletal: negative  Neurological: no TIA or stroke symptoms         OBJECTIVE:     Vital Signs (Most Recent)       Physical Exam:  ASA: 3  Mallampati: 1    General: no acute distress  Mental Status: alert and oriented to person, place and time  HEENT: normocephalic, atraumatic  Chest: unlabored breathing  Heart: regular heart rate  Abdomen: nondistended  Extremity: moves all extremities    Laboratory  Lab Results   Component Value Date    INR 0.9 09/03/2024       Lab Results   Component Value Date    WBC 6.05 09/03/2024    HGB 12.4 (L) 09/03/2024    HCT 39.9 (L) 09/03/2024    MCV 93 09/03/2024     09/03/2024      Lab Results   Component Value Date     07/28/2024     07/28/2024    K 3.8 07/28/2024     07/28/2024    CO2 28 07/28/2024    BUN 9 07/28/2024    CREATININE 0.7 07/28/2024    CALCIUM 8.8 07/28/2024    MG 2.0 07/28/2024    ALT 15 07/28/2024    AST 17 07/28/2024    ALBUMIN 3.7 07/28/2024    BILITOT 0.4 07/28/2024       ASSESSMENT/PLAN:     Sedation Plan: moderate  Patient will undergo liver mass biopsy.    Luis Avendaño MD MPH  Radiology PGY-3

## 2024-09-16 NOTE — DISCHARGE INSTRUCTIONS
"Please call with any questions or concerns.    Monday through Friday 8:00 am - 4:30 pm  Interventional Radiology Clinic  [phone]    After hours/weekends  Ask the  for the "Interventional Radiology Physician on call"  [phone]      "

## 2024-09-16 NOTE — DISCHARGE SUMMARY
Radiology Discharge Summary      Hospital Course: No complications    Admit Date: 9/16/2024  Discharge Date: 09/16/2024     Instructions Given to Patient: Yes  Diet: Resume prior diet  Activity: Activity as tolerated and no driving for today    Description of Condition on Discharge: Stable  Vital Signs (Most Recent): Temp: 97.5 °F (36.4 °C) (09/16/24 1102)  Pulse: 73 (09/16/24 1400)  Resp: 20 (09/16/24 1400)  BP: (!) 169/88 (09/16/24 1400)  SpO2: (!) 93 % (09/16/24 1400)    Discharge Disposition: Home    Discharge Diagnosis: liver lesion     Follow-up: Follow-up with referring provider    Phoebe Hendrickson MD

## 2024-09-16 NOTE — PROCEDURES
Interventional Radiology Post-Procedure Note     Pre Op Diagnosis:  liver lesion       Post Op Diagnosis: same     Procedure:  Imaged guided biopsy of liver lesion     Procedure performed by: Phoebe Hendrickson MD     Written Informed Consent Obtained: Yes     Specimen Removed: Yes.  18ga core samples x15     Estimated Blood Loss: Minimal     Findings:   Moderate sedation was administered.     Successful image-guided coaxial core biopsy of liver lesion.  Pathology did not confirm sample adequacy.     Phoebe Hendrickson MD

## 2024-09-16 NOTE — PROGRESS NOTES
Pt arrived  to MPU  #6 S/P IR  liver Bx C/O pain  5/10 and requesting  medication will  contact MD

## 2024-09-16 NOTE — PROGRESS NOTES
Pt up  and  ready  for  D/C  home  inst review  as well as  S&S  of infection and  when  to go  to ED  / pt  given D/C inst  wife in  room AAO / NAD  and  waiting on transport

## 2024-09-16 NOTE — NURSING
Liver mass biopsy complete. Pt tolerated well. VSS. No signs or symptoms of distress noted. Pt will be transferred to MPU bed escorted by RN and report to RN on arrival.   Specimen to pathology per MD

## 2024-09-16 NOTE — NURSING
Pt arrived to 173 for liver mass biopsy. Pt oriented to unit and staff. Plan of care reviewed with patient, patient verbalizes understanding. Comfort measures utilized. Pt safely transferred from stretcher to procedural table. Fall risk reviewed with patient, fall risk interventions maintained with direct observation/attendance. Safety strap applied, positioner pillows utilized to minimize pressure points. Blankets applied. Pt prepped and draped utilizing standard sterile technique. Patient placed on continuous monitoring, as required by sedation policy. Timeouts completed utilizing standard universal time-out, per department and facility policy. RN to remain at bedside, continuous monitoring maintained. Pt resting comfortably. Denies pain/discomfort. Will continue to monitor. See flow sheets for monitoring, medication administration, and updates.

## 2024-09-17 ENCOUNTER — TELEPHONE (OUTPATIENT)
Dept: SURGERY | Facility: CLINIC | Age: 64
End: 2024-09-17
Payer: MEDICARE

## 2024-09-17 NOTE — TELEPHONE ENCOUNTER
Call placed to pt. Spoke to pt spouse. She reports pt is doing well from biopsy yesterday. Reviewed appt details for 9/25 including time date and location. If  path has not resulted for appt on 9/25, will reschedule clinic appt. Pt spouse verbalized understanding.

## 2024-09-24 ENCOUNTER — TELEPHONE (OUTPATIENT)
Dept: SURGERY | Facility: CLINIC | Age: 64
End: 2024-09-24
Payer: MEDICARE

## 2024-09-24 NOTE — PROGRESS NOTES
Jose Rafael is well known to me s/p resection of an IPMN with a whipple in 2019 where he was found to have high grade dysplasia but no invasive cancer.  He has since been on surveillance. His course was complicated by a liver abscess in 2021 that resolved with perc drainage and abx and has been doing well since.     Recent admission for abdominal pain and fever which has resolved, but inpatient imaging shows two new liver lesions:  MRI:  Impression:     Significant degradation by motion.     Development of 2 new enhancing lesions within the liver concerning for developing metastatic disease given history of primary pancreatic neoplasm.     Stable appearance of additional previously described cysts/hemangiomas.        We discussed this. I think we are obligated to biopsy but its hard to imaging these reflect anything other than infectious etiology given the fact that his original resection specimen did not show invasive cancer. If his biopsies are negative I would favor repeat short interval imaging in 3-4 months.  -----------------------------  9/25/2024:    Jose Rafael returns after IR directed biopsy of this liver lesion. The path has returns benign. I continue to suspect this is all small abscess and would favor re-imaging in 3-4 months.    I spent 20 minutes with Jose Rafael and his wife Tiffany, with >50% of time spent face to face and additional time preparing to see the patient (eg, review of tests), obtaining and/or reviewing separately obtained history, documenting clinical information in the electronic or other health record, independently interpreting results (not separately reported) and communicating results to the patient/family/caregiver, or Care coordination (not separately reported).

## 2024-09-24 NOTE — TELEPHONE ENCOUNTER
----- Message from Brea Lloyd sent at 9/24/2024  8:26 AM CDT -----  Regarding: Keep appt for tmr?  Contact: Pt @[phone]  Pt's wife Tiffany is calling to ask if appt is needed for tomorrow also if results from previous test are in.. Please c/b to advise.. Thanks

## 2024-09-24 NOTE — TELEPHONE ENCOUNTER
Returned call. Spoke to pt spouse. Informed this RN called path office yesterday and they are aware pt has appt tomorrow. Will check with path office later today and call pt/pt spouse if clinic appt needs to be rescheduled. Pt spouse verbalized understanding.

## 2024-09-25 ENCOUNTER — OFFICE VISIT (OUTPATIENT)
Dept: SURGERY | Facility: CLINIC | Age: 64
End: 2024-09-25
Payer: MEDICARE

## 2024-09-25 VITALS
HEART RATE: 74 BPM | OXYGEN SATURATION: 94 % | BODY MASS INDEX: 22.96 KG/M2 | WEIGHT: 138 LBS | SYSTOLIC BLOOD PRESSURE: 132 MMHG | DIASTOLIC BLOOD PRESSURE: 76 MMHG

## 2024-09-25 DIAGNOSIS — K75.0 LIVER ABSCESS: Primary | ICD-10-CM

## 2024-09-25 PROCEDURE — 99999 PR PBB SHADOW E&M-EST. PATIENT-LVL III: CPT | Mod: PBBFAC,,, | Performed by: SURGERY

## 2024-09-25 PROCEDURE — 99213 OFFICE O/P EST LOW 20 MIN: CPT | Mod: S$PBB,,, | Performed by: SURGERY

## 2024-09-25 PROCEDURE — 99213 OFFICE O/P EST LOW 20 MIN: CPT | Mod: PBBFAC | Performed by: SURGERY

## 2024-09-26 ENCOUNTER — TELEPHONE (OUTPATIENT)
Dept: SURGERY | Facility: CLINIC | Age: 64
End: 2024-09-26
Payer: MEDICARE

## 2024-09-26 NOTE — TELEPHONE ENCOUNTER
Call placed to pt. Spoke to pt spouse. MRI and clinic appt scheduled per pt request. Details provided. Pt spouse verbalized understanding to all of the above.

## 2024-10-07 ENCOUNTER — APPOINTMENT (RX ONLY)
Dept: URBAN - METROPOLITAN AREA CLINIC 183 | Facility: CLINIC | Age: 64
Setting detail: DERMATOLOGY
End: 2024-10-07

## 2024-10-07 DIAGNOSIS — L57.0 ACTINIC KERATOSIS: ICD-10-CM

## 2024-10-07 DIAGNOSIS — L81.4 OTHER MELANIN HYPERPIGMENTATION: ICD-10-CM

## 2024-10-07 DIAGNOSIS — L82.1 OTHER SEBORRHEIC KERATOSIS: ICD-10-CM

## 2024-10-07 PROCEDURE — 17003 DESTRUCT PREMALG LES 2-14: CPT

## 2024-10-07 PROCEDURE — ? LIQUID NITROGEN

## 2024-10-07 PROCEDURE — ? COUNSELING

## 2024-10-07 PROCEDURE — 99213 OFFICE O/P EST LOW 20 MIN: CPT | Mod: 25

## 2024-10-07 PROCEDURE — 17000 DESTRUCT PREMALG LESION: CPT

## 2024-10-07 ASSESSMENT — LOCATION DETAILED DESCRIPTION DERM
LOCATION DETAILED: LEFT LATERAL MALAR CHEEK
LOCATION DETAILED: RIGHT MEDIAL FRONTAL SCALP
LOCATION DETAILED: LEFT DISTAL DORSAL FOREARM
LOCATION DETAILED: LEFT DISTAL PRETIBIAL REGION
LOCATION DETAILED: RIGHT LATERAL MALAR CHEEK
LOCATION DETAILED: RIGHT SUPERIOR UPPER BACK
LOCATION DETAILED: LEFT SUPERIOR TEMPLE
LOCATION DETAILED: LEFT INFERIOR CENTRAL MALAR CHEEK
LOCATION DETAILED: LEFT SUPERIOR PARIETAL SCALP

## 2024-10-07 ASSESSMENT — LOCATION ZONE DERM
LOCATION ZONE: TRUNK
LOCATION ZONE: FACE
LOCATION ZONE: SCALP
LOCATION ZONE: LEG
LOCATION ZONE: ARM

## 2024-10-07 ASSESSMENT — LOCATION SIMPLE DESCRIPTION DERM
LOCATION SIMPLE: RIGHT SCALP
LOCATION SIMPLE: LEFT CHEEK
LOCATION SIMPLE: RIGHT CHEEK
LOCATION SIMPLE: LEFT TEMPLE
LOCATION SIMPLE: LEFT FOREARM
LOCATION SIMPLE: RIGHT UPPER BACK
LOCATION SIMPLE: LEFT PRETIBIAL REGION
LOCATION SIMPLE: SCALP

## 2024-10-07 NOTE — PROCEDURE: LIQUID NITROGEN
Duration Of Freeze Thaw-Cycle (Seconds): 0
Render In Bullet Format When Appropriate: No
Detail Level: Simple
Total Number Of Aks Treated: 6
Post-Care Instructions: I reviewed with the patient in detail post-care instructions. Patient is to wear sunprotection, and avoid picking at any of the treated lesions. Pt may apply Vaseline to crusted or scabbing areas.
Consent: The patient's consent was obtained including but not limited to risks of crusting, scabbing, blistering, scarring, darker or lighter pigmentary change, recurrence, incomplete removal and infection.
Normal vision: sees adequately in most situations; can see medication labels, newsprint

## 2024-10-15 ENCOUNTER — PATIENT MESSAGE (OUTPATIENT)
Dept: RESEARCH | Facility: HOSPITAL | Age: 64
End: 2024-10-15
Payer: MEDICARE

## 2024-10-23 ENCOUNTER — PATIENT MESSAGE (OUTPATIENT)
Dept: RESEARCH | Facility: HOSPITAL | Age: 64
End: 2024-10-23
Payer: MEDICARE

## 2025-01-02 NOTE — PROGRESS NOTES
Jose Rafael is well known to me s/p resection of an IPMN with a whipple in 2019 where he was found to have high grade dysplasia but no invasive cancer.  He has since been on surveillance. His course was complicated by a liver abscess in 2021 that resolved with perc drainage and abx and has been doing well since.     Recent admission for abdominal pain and fever which has resolved, but inpatient imaging shows two new liver lesions:  MRI:  Impression:     Significant degradation by motion.     Development of 2 new enhancing lesions within the liver concerning for developing metastatic disease given history of primary pancreatic neoplasm.     Stable appearance of additional previously described cysts/hemangiomas.        We discussed this. I think we are obligated to biopsy but its hard to imaging these reflect anything other than infectious etiology given the fact that his original resection specimen did not show invasive cancer. If his biopsies are negative I would favor repeat short interval imaging in 3-4 months.  -----------------------------  9/25/2024:     Jose Rafael returns after IR directed biopsy of this liver lesion. The path has returns benign. I continue to suspect this is all small abscess and would favor re-imaging in 3-4 months.  ----------------------------  1/8/2025:    Jose Rafael returns with followup MRI for these liver lesions. Repeat MRI shows interval resolution of liver lesions, re-enforcing infectious or inflammatory etiology. He is otherwise doing well today with no N/V, fever, chills or other symptoms of systemic infection. He is tolerating a diet and having regular bowel movements. He has no additional concerns or complaints at this time. This confirms my suspicions here, and I have no real concern for metastatic disease.    Physical exam unremarkable save for known R. Thyroid nodule that is being monitored and has remained stable.    MRI 1/7/2025:  Impression:  Interval resolution of the 2 findings in the  liver which were new at the time of the prior examination.  This suggests an infectious or inflammatory etiology.  Numerous hepatic cysts and a stable hemangioma.  Status post previous Whipple procedure with no evidence of recurrent or metastatic disease.    I spent 30 minutes with Mr. Campbell, with >50% of time spent face to face and additional time preparing to see the patient (eg, review of tests), obtaining and/or reviewing separately obtained history, documenting clinical information in the electronic or other health record, independently interpreting results (not separately reported) and communicating results to the patient/family/caregiver, or Care coordination (not separately reported).           Paul King MD  Upper GI / Hepatobiliary Surgical Oncology  [facility] [place], [place]  Office: [phone]  Fax: [phone]

## 2025-01-07 ENCOUNTER — HOSPITAL ENCOUNTER (OUTPATIENT)
Dept: RADIOLOGY | Facility: HOSPITAL | Age: 65
Discharge: HOME | End: 2025-01-07
Attending: SURGERY
Payer: MEDICARE

## 2025-01-07 DIAGNOSIS — K75.0 LIVER ABSCESS: ICD-10-CM

## 2025-01-07 LAB
CREAT SERPL-MCNC: 0.8 MG/DL (ref 0.5–1.4)
SAMPLE: NORMAL

## 2025-01-07 PROCEDURE — 25500020 PHARM REV CODE 255: Performed by: SURGERY

## 2025-01-07 PROCEDURE — 74183 MRI ABD W/O CNTR FLWD CNTR: CPT | Mod: 26,,, | Performed by: RADIOLOGY

## 2025-01-07 PROCEDURE — 74183 MRI ABD W/O CNTR FLWD CNTR: CPT | Mod: TC

## 2025-01-07 PROCEDURE — A9585 GADOBUTROL INJECTION: HCPCS | Performed by: SURGERY

## 2025-01-07 RX ORDER — GADOBUTROL 604.72 MG/ML
6 INJECTION INTRAVENOUS
Status: COMPLETED | OUTPATIENT
Start: 2025-01-07 | End: 2025-01-07

## 2025-01-07 RX ADMIN — GADOBUTROL 6 ML: 604.72 INJECTION INTRAVENOUS at 11:01

## 2025-01-08 ENCOUNTER — OFFICE VISIT (OUTPATIENT)
Dept: SURGERY | Facility: CLINIC | Age: 65
End: 2025-01-08
Payer: MEDICARE

## 2025-01-08 VITALS — BODY MASS INDEX: 23.68 KG/M2 | WEIGHT: 142.31 LBS

## 2025-01-08 DIAGNOSIS — K75.0 LIVER ABSCESS: ICD-10-CM

## 2025-01-08 DIAGNOSIS — D49.0 IPMN (INTRADUCTAL PAPILLARY MUCINOUS NEOPLASM): Primary | ICD-10-CM

## 2025-01-08 PROCEDURE — 99212 OFFICE O/P EST SF 10 MIN: CPT | Mod: PBBFAC | Performed by: SURGERY

## 2025-01-08 PROCEDURE — 99999 PR PBB SHADOW E&M-EST. PATIENT-LVL II: CPT | Mod: PBBFAC,,, | Performed by: SURGERY

## 2025-01-08 PROCEDURE — 99214 OFFICE O/P EST MOD 30 MIN: CPT | Mod: S$PBB,,, | Performed by: SURGERY

## 2025-01-10 ENCOUNTER — DOCUMENTATION ONLY (OUTPATIENT)
Dept: SURGERY | Facility: CLINIC | Age: 65
End: 2025-01-10
Payer: MEDICARE

## 2025-03-21 NOTE — TELEPHONE ENCOUNTER
Called about UEUS scheduled 10/3/19 at 0900.  Unable to contact by phone.  Sent [facility] message, emailed and mailed instructions.   Per admitting staff, patient left after teletriage at 1605. Provider aware.

## 2025-08-22 ENCOUNTER — TELEPHONE (OUTPATIENT)
Dept: SURGERY | Facility: CLINIC | Age: 65
End: 2025-08-22
Payer: MEDICARE

## 2025-08-22 DIAGNOSIS — D49.0 IPMN (INTRADUCTAL PAPILLARY MUCINOUS NEOPLASM): Primary | ICD-10-CM

## (undated) DEVICE — DRESSING AQUACEL SACRAL 9 X 9

## (undated) DEVICE — SUT PROLENE 3-0 SH DA 36 BL

## (undated) DEVICE — DRAIN CHANNEL ROUND 15FR

## (undated) DEVICE — ENDO GIA UNIVERSAL 12MM

## (undated) DEVICE — TRAY FOLEY 16FR INFECTION CONT

## (undated) DEVICE — TUBE FEEDING PURPLE 5FRX40CM

## (undated) DEVICE — SET DECANTER MEDICHOICE

## (undated) DEVICE — SPONGE IV DRAIN 4X4 STERILE

## (undated) DEVICE — SUT 4/0 27IN PDS II VIO MO

## (undated) DEVICE — STAPLER HANDLE XL 26 CM

## (undated) DEVICE — SUT SILK SH 2-0 30IN MP BLK

## (undated) DEVICE — SEE MEDLINE ITEM 152622

## (undated) DEVICE — BLADE 4 INCH EDGE UN-INS

## (undated) DEVICE — HEMOSTAT SURGICEL NU-KNIT 6X9

## (undated) DEVICE — APPLIER LIGACLIP SM 9.38IN

## (undated) DEVICE — DRAPE ABDOMINAL TIBURON 14X11

## (undated) DEVICE — SEALER LIGASURE MARYLAND 23CM

## (undated) DEVICE — SUT SILK 3-0 SH DETACH 30IN

## (undated) DEVICE — SPONGE LAP 18X18 PREWASHED

## (undated) DEVICE — SYR 30CC LUER LOCK

## (undated) DEVICE — LOOP VESSEL BLUE MAXI

## (undated) DEVICE — SUT PROLENE 5/0 RB-1 36 IN

## (undated) DEVICE — SUT CTD VICRYL VIL BR CR/SH

## (undated) DEVICE — ADHESIVE DERMABOND ADVANCED

## (undated) DEVICE — SEALER AQUAMANTYS 2.3 BIPOLAR

## (undated) DEVICE — SUT VICRYL+ 1 CT1 18IN

## (undated) DEVICE — SUT SILK 2-0 STRANDS 30IN

## (undated) DEVICE — SUT 2-0 12-18IN SILK

## (undated) DEVICE — Device

## (undated) DEVICE — STAPLER SKIN PROXIMATE WIDE

## (undated) DEVICE — SUT 5/0 27IN PDS II VIO MO

## (undated) DEVICE — NDL 18GA X1 1/2 REG BEVEL

## (undated) DEVICE — STAPLER ENDO GIA 60MM MED THCK

## (undated) DEVICE — TUBE FEEDING PURPLE 8FRX40CM

## (undated) DEVICE — SEE MEDLINE ITEM 146292

## (undated) DEVICE — ELECTRODE REM PLYHSV RETURN 9

## (undated) DEVICE — APPLIER CLIP LIAGCLIP 9.375IN

## (undated) DEVICE — SUT SILK 3-0 STRANDS 30IN

## (undated) DEVICE — SUT 3-0 12-18IN SILK

## (undated) DEVICE — SUT VICRYL 3-0 27 SH

## (undated) DEVICE — SUT 6/0 30IN PDS II VIO MON

## (undated) DEVICE — PAD K-THERMIA 24IN X 60IN

## (undated) DEVICE — SUT PROLENE RB-1 4-0

## (undated) DEVICE — SUT PROLENE 4-0 RB-1 BL MO

## (undated) DEVICE — GAUZE SPONGE 4X4 12PLY

## (undated) DEVICE — SUT 2-0 VICRYL / CT-1

## (undated) DEVICE — GAUZE SPONGE PEANUT STRL

## (undated) DEVICE — CONTAINER SPECIMEN STRL 4OZ

## (undated) DEVICE — KIT SAHARA DRAPE DRAW/LIFT

## (undated) DEVICE — SEE MEDLINE ITEM 157144

## (undated) DEVICE — EVACUATOR WOUND BULB 100CC

## (undated) DEVICE — SEE MEDLINE ITEM 156902

## (undated) DEVICE — SUT ETHILON 2-0 PSLX 30IN

## (undated) DEVICE — BOOT SUTURE AID

## (undated) DEVICE — TUBE PENROSE DRAIN 18IN X 3/8I

## (undated) DEVICE — SUT SILK 3-0 SH 18IN BLACK

## (undated) DEVICE — SUT 1 48IN PDS II VIO MONO

## (undated) DEVICE — SYR 10CC LUER LOCK

## (undated) DEVICE — ELECTRODE NEEDLE 2.8IN

## (undated) DEVICE — GOWN SURGICAL X-LARGE

## (undated) DEVICE — SUT 3-0 VICRYL / SH (J416)

## (undated) DEVICE — SEE MEDLINE ITEM 157117

## (undated) DEVICE — APPLICATOR CHLORAPREP ORN 26ML

## (undated) DEVICE — SEE MEDLINE ITEM 146313

## (undated) DEVICE — SUT 5/0 30IN PDS II VIO MO